# Patient Record
Sex: FEMALE | Race: WHITE
[De-identification: names, ages, dates, MRNs, and addresses within clinical notes are randomized per-mention and may not be internally consistent; named-entity substitution may affect disease eponyms.]

---

## 2020-03-12 ENCOUNTER — HOSPITAL ENCOUNTER (OUTPATIENT)
Dept: HOSPITAL 8 - CFH | Age: 75
Discharge: HOME | End: 2020-03-12
Attending: INTERNAL MEDICINE
Payer: MEDICARE

## 2020-03-12 DIAGNOSIS — I34.0: Primary | ICD-10-CM

## 2020-03-12 DIAGNOSIS — I11.9: ICD-10-CM

## 2020-03-12 DIAGNOSIS — E78.5: ICD-10-CM

## 2020-03-12 PROCEDURE — 93306 TTE W/DOPPLER COMPLETE: CPT

## 2020-03-13 ENCOUNTER — HOSPITAL ENCOUNTER (OUTPATIENT)
Dept: HOSPITAL 8 - RAD | Age: 75
Discharge: HOME | End: 2020-03-13
Attending: INTERNAL MEDICINE
Payer: MEDICARE

## 2020-03-13 DIAGNOSIS — I10: Primary | ICD-10-CM

## 2020-03-13 DIAGNOSIS — R07.89: ICD-10-CM

## 2020-03-13 PROCEDURE — 78452 HT MUSCLE IMAGE SPECT MULT: CPT

## 2020-03-13 PROCEDURE — A9502 TC99M TETROFOSMIN: HCPCS

## 2020-03-13 PROCEDURE — 93017 CV STRESS TEST TRACING ONLY: CPT

## 2021-01-15 DIAGNOSIS — Z23 NEED FOR VACCINATION: ICD-10-CM

## 2021-02-23 ENCOUNTER — CLINICAL SUPPORT (OUTPATIENT)
Dept: AUDIOLOGY | Facility: CLINIC | Age: 76
End: 2021-02-23

## 2021-02-23 ENCOUNTER — OFFICE VISIT (OUTPATIENT)
Dept: OTOLARYNGOLOGY | Facility: CLINIC | Age: 76
End: 2021-02-23

## 2021-02-23 VITALS
DIASTOLIC BLOOD PRESSURE: 85 MMHG | OXYGEN SATURATION: 99 % | BODY MASS INDEX: 31.54 KG/M2 | SYSTOLIC BLOOD PRESSURE: 130 MMHG | HEIGHT: 62 IN | TEMPERATURE: 98.1 F | WEIGHT: 171.4 LBS

## 2021-02-23 DIAGNOSIS — H93.11 TINNITUS OF RIGHT EAR: ICD-10-CM

## 2021-02-23 DIAGNOSIS — H91.21 SUDDEN IDIOPATHIC HEARING LOSS OF RIGHT EAR WITH RESTRICTED HEARING OF LEFT EAR: Primary | ICD-10-CM

## 2021-02-23 DIAGNOSIS — H90.A21 SENSORINEURAL HEARING LOSS (SNHL) OF RIGHT EAR WITH RESTRICTED HEARING OF LEFT EAR: Primary | ICD-10-CM

## 2021-02-23 PROCEDURE — 92557 COMPREHENSIVE HEARING TEST: CPT | Performed by: AUDIOLOGIST

## 2021-02-23 PROCEDURE — 99204 OFFICE O/P NEW MOD 45 MIN: CPT | Performed by: OTOLARYNGOLOGY

## 2021-02-23 PROCEDURE — 92567 TYMPANOMETRY: CPT | Performed by: AUDIOLOGIST

## 2021-02-23 RX ORDER — PREDNISONE 20 MG/1
TABLET ORAL
Qty: 24 TABLET | Refills: 0 | Status: SHIPPED | OUTPATIENT
Start: 2021-02-23 | End: 2021-03-12 | Stop reason: HOSPADM

## 2021-02-23 RX ORDER — CLONIDINE HYDROCHLORIDE 0.1 MG/1
0.1 TABLET ORAL 2 TIMES DAILY PRN
COMMUNITY
Start: 2020-12-22 | End: 2021-03-12 | Stop reason: HOSPADM

## 2021-02-23 NOTE — PROGRESS NOTES
STANDARD AUDIOMETRIC EVALUATION      Name:  Josi Roe  :  1945  Age:  75 y.o.  Date of Evaluation:  2021      HISTORY    Reason for visit:  Josi Roe is seen today for a hearing test at the request of Dr. Emile Oliver.  Patient reports can't hear in her right ear for the past 1 1/2 months.  She states sometimes she hears chirping sounds in her right ear.       EVALUATION    See Audiogram    RESULTS        Otoscopy and Tympanometry 226 Hz :  Right Ear:  Otoscopy:  Testing completed after ears were examined by the ENT physician          Tympanometry:  Negative middle ear pressure    Left Ear:   Otoscopy:  Testing completed after ears were examined by the ENT physician        Tympanometry:  Negative middle ear pressure    Test technique:  Standard Audiometry     Pure Tone Audiometry:   Patient responded to pure tones at 105-115 dB for 500-2000 Hz with no response to tones at other frequencies in right ear, and at 25-75 dB for 250-8000 Hz in left ear.       Speech Audiometry:        Right Ear:  Speech Awareness Threshold (SAT) was observed at 85 dBHL, masked                 Speech Discrimination scores were 0% in masking noise when words were presented at  100 dBHL       Left Ear:  Speech Reception Threshold (SRT) was obtained at 20 dBHL                 Speech Discrimination scores were 100% in quiet when words were presented at 60 dBHL    Reliability:   good    IMPRESSIONS:  1.  Tympanometry results are consistent with Negative middle ear pressure in both ears.  2.  Pure tone results are consistent with profound flat sensorineural hearing loss  for right ear, and mild to moderately severe sloping sensorineural hearing loss  in left ear.       RECOMMENDATIONS:  Patient is seeing the Ear Nose and Throat physician immediately following this examination.  It was a pleasure seeing Josi Roe in Audiology today.  We would be happy to do further testing or discuss these test as  necessary.          This document has been electronically signed by Becky Blackmon MS CCC-A on February 23, 2021 10:26 CST       Becky Blackmon MS CCC-A  Licensed Audiologist

## 2021-02-23 NOTE — PROGRESS NOTES
"Subjective   Josi Roe is a 75 y.o. female.       History of Present Illness   Patient reports that about 3 months ago she had wax in the right ear.  She went to urgent care and got that irrigated and was improved although a little dizzy.  Then approximately a month and a half ago she noticed decreased hearing in her right ear and put a Q-tip in her ear and felt some blood.  Was treated with oral antibiotics.  Had significant vertigo at that point.  Went back to see her family doctor who said her ear was clear.  Her vertigo has improved although not completely resolved.  Hearing is still \"gone\" from the right ear.  No significant occupational noise exposure.  No family history of hearing loss.  No previous otologic surgery.      The following portions of the patient's history were reviewed and updated as appropriate: allergies, current medications, past family history, past medical history, past social history, past surgical history and problem list.     reports that she has never smoked. She has never used smokeless tobacco. Alcohol use questions deferred to the physician. Drug use questions deferred to the physician.   Patient is not a tobacco user and has not been counseled for use of tobacco products      Review of Systems        Objective   Physical Exam  Ears: External ears no deformity, canals no discharge, tympanic membranes intact clear and mobile bilaterally.  Nose: No discharge or purulence  Oral cavity: No masses or lesions  Pharynx: No erythema or exudate  Neck: No lymphadenopathy.  No thyromegaly.  Trachea and larynx midline.  No masses in the parotid or submandibular glands.    Audiogram is obtained and reviewed.  She has profound sensorineural hearing loss on the right in all frequencies with 0% discrimination.  Mild sloping to moderate sensorineural loss  Is present on the left.  Tympanograms are mild negative pressure bilaterally.    Assessment/Plan   Diagnoses and all orders for this " visit:    1. Sensorineural hearing loss (SNHL) of right ear with restricted hearing of left ear (Primary)  -     MRI Internal Auditory Canal With Wo; Future    Other orders  -     predniSONE (DELTASONE) 20 MG tablet; 1 by mouth 3 times a day for 4 days one by mouth twice a day for 4 days, then 1 by mouth daily for 4 days.  Dispense: 24 tablet; Refill: 0        Plan: We will treat as if this is sudden sensorineural hearing loss.  Prednisone taper 60 mg down to nothing over 12 days.  Obtain MRI scan of the temporal bones with contrast to evaluate for retrocochlear tumor.  Return in 2 weeks with a hearing test and call sooner for problems.

## 2021-03-02 ENCOUNTER — TRANSCRIBE ORDERS (OUTPATIENT)
Dept: MRI IMAGING | Facility: HOSPITAL | Age: 76
End: 2021-03-02

## 2021-03-02 DIAGNOSIS — H90.A21 SENSORINEURAL HEARING LOSS (SNHL) OF RIGHT EAR WITH RESTRICTED HEARING OF LEFT EAR: Primary | ICD-10-CM

## 2021-03-03 ENCOUNTER — LAB (OUTPATIENT)
Dept: LAB | Facility: HOSPITAL | Age: 76
End: 2021-03-03

## 2021-03-03 ENCOUNTER — HOSPITAL ENCOUNTER (OUTPATIENT)
Dept: MRI IMAGING | Facility: HOSPITAL | Age: 76
Discharge: HOME | End: 2021-03-03

## 2021-03-03 DIAGNOSIS — H90.A21 SENSORINEURAL HEARING LOSS (SNHL) OF RIGHT EAR WITH RESTRICTED HEARING OF LEFT EAR: ICD-10-CM

## 2021-03-03 LAB
BUN SERPL-MCNC: 36 MG/DL (ref 8–23)
CREAT SERPL-MCNC: 0.99 MG/DL (ref 0.57–1)
GFR SERPL CREATININE-BSD FRML MDRD: 55 ML/MIN/1.73

## 2021-03-03 PROCEDURE — 82565 ASSAY OF CREATININE: CPT

## 2021-03-03 PROCEDURE — 25010000002 GADOTERIDOL PER 1 ML: Performed by: OTOLARYNGOLOGY

## 2021-03-03 PROCEDURE — 36415 COLL VENOUS BLD VENIPUNCTURE: CPT

## 2021-03-03 PROCEDURE — A9579 GAD-BASE MR CONTRAST NOS,1ML: HCPCS | Performed by: OTOLARYNGOLOGY

## 2021-03-03 PROCEDURE — 70553 MRI BRAIN STEM W/O & W/DYE: CPT

## 2021-03-03 PROCEDURE — 84520 ASSAY OF UREA NITROGEN: CPT

## 2021-03-03 RX ADMIN — GADOTERIDOL 17 ML: 279.3 INJECTION, SOLUTION INTRAVENOUS at 13:02

## 2021-03-07 ENCOUNTER — APPOINTMENT (OUTPATIENT)
Dept: GENERAL RADIOLOGY | Facility: HOSPITAL | Age: 76
End: 2021-03-07

## 2021-03-07 ENCOUNTER — HOSPITAL ENCOUNTER (INPATIENT)
Facility: HOSPITAL | Age: 76
LOS: 5 days | Discharge: NURSING, REHAB OR LONG-TERM CARE | End: 2021-03-12
Attending: FAMILY MEDICINE | Admitting: INTERNAL MEDICINE

## 2021-03-07 ENCOUNTER — APPOINTMENT (OUTPATIENT)
Dept: CT IMAGING | Facility: HOSPITAL | Age: 76
End: 2021-03-07

## 2021-03-07 ENCOUNTER — APPOINTMENT (OUTPATIENT)
Dept: MRI IMAGING | Facility: HOSPITAL | Age: 76
End: 2021-03-07

## 2021-03-07 DIAGNOSIS — I63.30 CEREBROVASCULAR ACCIDENT (CVA) DUE TO THROMBOSIS OF CEREBRAL ARTERY (HCC): Primary | ICD-10-CM

## 2021-03-07 DIAGNOSIS — Z78.9 IMPAIRED MOBILITY AND ADLS: ICD-10-CM

## 2021-03-07 DIAGNOSIS — I16.1 HYPERTENSIVE EMERGENCY: ICD-10-CM

## 2021-03-07 DIAGNOSIS — Z74.09 IMPAIRED FUNCTIONAL MOBILITY, BALANCE, GAIT, AND ENDURANCE: ICD-10-CM

## 2021-03-07 DIAGNOSIS — Z74.09 IMPAIRED MOBILITY AND ADLS: ICD-10-CM

## 2021-03-07 DIAGNOSIS — I67.858 OTHER HEREDITARY CEREBROVASCULAR DISEASE: ICD-10-CM

## 2021-03-07 DIAGNOSIS — I63.9 CEREBROVASCULAR ACCIDENT (CVA), UNSPECIFIED MECHANISM (HCC): ICD-10-CM

## 2021-03-07 LAB
ALBUMIN SERPL-MCNC: 3.9 G/DL (ref 3.5–5.2)
ALBUMIN/GLOB SERPL: 1.3 G/DL
ALP SERPL-CCNC: 58 U/L (ref 39–117)
ALT SERPL W P-5'-P-CCNC: 20 U/L (ref 1–33)
ANION GAP SERPL CALCULATED.3IONS-SCNC: 7 MMOL/L (ref 5–15)
AST SERPL-CCNC: 14 U/L (ref 1–32)
BASOPHILS # BLD AUTO: 0.02 10*3/MM3 (ref 0–0.2)
BASOPHILS NFR BLD AUTO: 0.2 % (ref 0–1.5)
BILIRUB SERPL-MCNC: 0.5 MG/DL (ref 0–1.2)
BUN SERPL-MCNC: 30 MG/DL (ref 8–23)
BUN/CREAT SERPL: 35.7 (ref 7–25)
CALCIUM SPEC-SCNC: 11.9 MG/DL (ref 8.6–10.5)
CHLORIDE SERPL-SCNC: 106 MMOL/L (ref 98–107)
CO2 SERPL-SCNC: 27 MMOL/L (ref 22–29)
CREAT SERPL-MCNC: 0.84 MG/DL (ref 0.57–1)
DEPRECATED RDW RBC AUTO: 42.1 FL (ref 37–54)
EOSINOPHIL # BLD AUTO: 0.08 10*3/MM3 (ref 0–0.4)
EOSINOPHIL NFR BLD AUTO: 0.7 % (ref 0.3–6.2)
ERYTHROCYTE [DISTWIDTH] IN BLOOD BY AUTOMATED COUNT: 13.5 % (ref 12.3–15.4)
FLUAV RNA RESP QL NAA+PROBE: NOT DETECTED
FLUBV RNA RESP QL NAA+PROBE: NOT DETECTED
GFR SERPL CREATININE-BSD FRML MDRD: 66 ML/MIN/1.73
GLOBULIN UR ELPH-MCNC: 3 GM/DL
GLUCOSE SERPL-MCNC: 75 MG/DL (ref 65–99)
HCT VFR BLD AUTO: 41.4 % (ref 34–46.6)
HGB BLD-MCNC: 14.1 G/DL (ref 12–15.9)
HOLD SPECIMEN: NORMAL
HOLD SPECIMEN: NORMAL
IMM GRANULOCYTES # BLD AUTO: 0.08 10*3/MM3 (ref 0–0.05)
IMM GRANULOCYTES NFR BLD AUTO: 0.7 % (ref 0–0.5)
LYMPHOCYTES # BLD AUTO: 2.31 10*3/MM3 (ref 0.7–3.1)
LYMPHOCYTES NFR BLD AUTO: 20.7 % (ref 19.6–45.3)
MCH RBC QN AUTO: 29.5 PG (ref 26.6–33)
MCHC RBC AUTO-ENTMCNC: 34.1 G/DL (ref 31.5–35.7)
MCV RBC AUTO: 86.6 FL (ref 79–97)
MONOCYTES # BLD AUTO: 1.11 10*3/MM3 (ref 0.1–0.9)
MONOCYTES NFR BLD AUTO: 9.9 % (ref 5–12)
NEUTROPHILS NFR BLD AUTO: 67.8 % (ref 42.7–76)
NEUTROPHILS NFR BLD AUTO: 7.58 10*3/MM3 (ref 1.7–7)
NRBC BLD AUTO-RTO: 0 /100 WBC (ref 0–0.2)
NT-PROBNP SERPL-MCNC: 623.1 PG/ML (ref 0–900)
PLATELET # BLD AUTO: 278 10*3/MM3 (ref 140–450)
PMV BLD AUTO: 11 FL (ref 6–12)
POTASSIUM SERPL-SCNC: 4.2 MMOL/L (ref 3.5–5.2)
PROT SERPL-MCNC: 6.9 G/DL (ref 6–8.5)
RBC # BLD AUTO: 4.78 10*6/MM3 (ref 3.77–5.28)
SARS-COV-2 RNA RESP QL NAA+PROBE: NOT DETECTED
SODIUM SERPL-SCNC: 140 MMOL/L (ref 136–145)
TROPONIN T SERPL-MCNC: <0.01 NG/ML (ref 0–0.03)
TROPONIN T SERPL-MCNC: <0.01 NG/ML (ref 0–0.03)
WBC # BLD AUTO: 11.18 10*3/MM3 (ref 3.4–10.8)
WHOLE BLOOD HOLD SPECIMEN: NORMAL

## 2021-03-07 PROCEDURE — 93005 ELECTROCARDIOGRAM TRACING: CPT | Performed by: FAMILY MEDICINE

## 2021-03-07 PROCEDURE — 70450 CT HEAD/BRAIN W/O DYE: CPT

## 2021-03-07 PROCEDURE — 70496 CT ANGIOGRAPHY HEAD: CPT

## 2021-03-07 PROCEDURE — 87636 SARSCOV2 & INF A&B AMP PRB: CPT | Performed by: FAMILY MEDICINE

## 2021-03-07 PROCEDURE — 99285 EMERGENCY DEPT VISIT HI MDM: CPT

## 2021-03-07 PROCEDURE — 83880 ASSAY OF NATRIURETIC PEPTIDE: CPT | Performed by: FAMILY MEDICINE

## 2021-03-07 PROCEDURE — 80053 COMPREHEN METABOLIC PANEL: CPT | Performed by: FAMILY MEDICINE

## 2021-03-07 PROCEDURE — 25010000002 HYDRALAZINE PER 20 MG: Performed by: FAMILY MEDICINE

## 2021-03-07 PROCEDURE — 25010000003 ATROPINE SULFATE

## 2021-03-07 PROCEDURE — 93010 ELECTROCARDIOGRAM REPORT: CPT | Performed by: INTERNAL MEDICINE

## 2021-03-07 PROCEDURE — 70551 MRI BRAIN STEM W/O DYE: CPT

## 2021-03-07 PROCEDURE — 70498 CT ANGIOGRAPHY NECK: CPT

## 2021-03-07 PROCEDURE — 71045 X-RAY EXAM CHEST 1 VIEW: CPT

## 2021-03-07 PROCEDURE — 25010000002 ENOXAPARIN PER 10 MG: Performed by: INTERNAL MEDICINE

## 2021-03-07 PROCEDURE — 99223 1ST HOSP IP/OBS HIGH 75: CPT | Performed by: PSYCHIATRY & NEUROLOGY

## 2021-03-07 PROCEDURE — 0 IOPAMIDOL PER 1 ML: Performed by: FAMILY MEDICINE

## 2021-03-07 PROCEDURE — 84484 ASSAY OF TROPONIN QUANT: CPT | Performed by: FAMILY MEDICINE

## 2021-03-07 PROCEDURE — 85025 COMPLETE CBC W/AUTO DIFF WBC: CPT | Performed by: FAMILY MEDICINE

## 2021-03-07 RX ORDER — ACETAMINOPHEN 325 MG/1
650 TABLET ORAL EVERY 4 HOURS PRN
Status: DISCONTINUED | OUTPATIENT
Start: 2021-03-07 | End: 2021-03-12 | Stop reason: HOSPADM

## 2021-03-07 RX ORDER — ASPIRIN 325 MG
325 TABLET ORAL ONCE
Status: COMPLETED | OUTPATIENT
Start: 2021-03-07 | End: 2021-03-07

## 2021-03-07 RX ORDER — DEXTROSE AND SODIUM CHLORIDE 5; .45 G/100ML; G/100ML
75 INJECTION, SOLUTION INTRAVENOUS CONTINUOUS
Status: DISCONTINUED | OUTPATIENT
Start: 2021-03-07 | End: 2021-03-07

## 2021-03-07 RX ORDER — ACETAMINOPHEN 160 MG/5ML
650 SOLUTION ORAL EVERY 4 HOURS PRN
Status: DISCONTINUED | OUTPATIENT
Start: 2021-03-07 | End: 2021-03-12 | Stop reason: HOSPADM

## 2021-03-07 RX ORDER — SODIUM CHLORIDE 9 MG/ML
INJECTION, SOLUTION INTRAVENOUS
Status: COMPLETED
Start: 2021-03-07 | End: 2021-03-07

## 2021-03-07 RX ORDER — ONDANSETRON 2 MG/ML
4 INJECTION INTRAMUSCULAR; INTRAVENOUS EVERY 6 HOURS PRN
Status: DISCONTINUED | OUTPATIENT
Start: 2021-03-07 | End: 2021-03-12 | Stop reason: HOSPADM

## 2021-03-07 RX ORDER — SODIUM CHLORIDE 0.9 % (FLUSH) 0.9 %
10 SYRINGE (ML) INJECTION AS NEEDED
Status: DISCONTINUED | OUTPATIENT
Start: 2021-03-07 | End: 2021-03-12 | Stop reason: HOSPADM

## 2021-03-07 RX ORDER — SODIUM CHLORIDE 9 MG/ML
75 INJECTION, SOLUTION INTRAVENOUS CONTINUOUS
Status: DISCONTINUED | OUTPATIENT
Start: 2021-03-07 | End: 2021-03-10

## 2021-03-07 RX ORDER — PANTOPRAZOLE SODIUM 40 MG/10ML
40 INJECTION, POWDER, LYOPHILIZED, FOR SOLUTION INTRAVENOUS
Status: DISCONTINUED | OUTPATIENT
Start: 2021-03-08 | End: 2021-03-09

## 2021-03-07 RX ORDER — HYDRALAZINE HYDROCHLORIDE 20 MG/ML
10 INJECTION INTRAMUSCULAR; INTRAVENOUS ONCE
Status: COMPLETED | OUTPATIENT
Start: 2021-03-07 | End: 2021-03-07

## 2021-03-07 RX ORDER — ACETAMINOPHEN 650 MG/1
650 SUPPOSITORY RECTAL EVERY 4 HOURS PRN
Status: DISCONTINUED | OUTPATIENT
Start: 2021-03-07 | End: 2021-03-12 | Stop reason: HOSPADM

## 2021-03-07 RX ORDER — SODIUM CHLORIDE 0.9 % (FLUSH) 0.9 %
10 SYRINGE (ML) INJECTION EVERY 12 HOURS SCHEDULED
Status: DISCONTINUED | OUTPATIENT
Start: 2021-03-07 | End: 2021-03-12 | Stop reason: HOSPADM

## 2021-03-07 RX ORDER — CLOPIDOGREL BISULFATE 75 MG/1
300 TABLET ORAL ONCE
Status: COMPLETED | OUTPATIENT
Start: 2021-03-07 | End: 2021-03-07

## 2021-03-07 RX ORDER — ATORVASTATIN CALCIUM 40 MG/1
80 TABLET, FILM COATED ORAL DAILY
Status: DISCONTINUED | OUTPATIENT
Start: 2021-03-07 | End: 2021-03-12 | Stop reason: HOSPADM

## 2021-03-07 RX ORDER — ONDANSETRON 4 MG/1
4 TABLET, FILM COATED ORAL EVERY 6 HOURS PRN
Status: DISCONTINUED | OUTPATIENT
Start: 2021-03-07 | End: 2021-03-12 | Stop reason: HOSPADM

## 2021-03-07 RX ADMIN — ENOXAPARIN SODIUM 40 MG: 40 INJECTION SUBCUTANEOUS at 16:11

## 2021-03-07 RX ADMIN — SODIUM CHLORIDE 1000 ML: 900 INJECTION, SOLUTION INTRAVENOUS at 14:41

## 2021-03-07 RX ADMIN — ATROPINE SULFATE 1 MG: 0.1 INJECTION PARENTERAL at 14:42

## 2021-03-07 RX ADMIN — HYDRALAZINE HYDROCHLORIDE 10 MG: 20 INJECTION INTRAMUSCULAR; INTRAVENOUS at 09:43

## 2021-03-07 RX ADMIN — SODIUM CHLORIDE 1 MG/HR: 9 INJECTION, SOLUTION INTRAVENOUS at 19:49

## 2021-03-07 RX ADMIN — ASPIRIN 325 MG: 325 TABLET, COATED ORAL at 14:48

## 2021-03-07 RX ADMIN — DEXTROSE AND SODIUM CHLORIDE 75 ML/HR: 5; 450 INJECTION, SOLUTION INTRAVENOUS at 15:57

## 2021-03-07 RX ADMIN — IOPAMIDOL 90 ML: 755 INJECTION, SOLUTION INTRAVENOUS at 15:27

## 2021-03-07 RX ADMIN — ATORVASTATIN CALCIUM 80 MG: 40 TABLET, FILM COATED ORAL at 16:01

## 2021-03-07 RX ADMIN — SODIUM CHLORIDE 75 ML/HR: 900 INJECTION, SOLUTION INTRAVENOUS at 19:57

## 2021-03-07 RX ADMIN — CLOPIDOGREL BISULFATE 300 MG: 75 TABLET ORAL at 14:47

## 2021-03-07 RX ADMIN — SODIUM CHLORIDE 5 MG/HR: 9 INJECTION, SOLUTION INTRAVENOUS at 11:38

## 2021-03-07 NOTE — ED NOTES
Dr newsome called to bedside for adverse change in vital signs, cardene drip stopped, pt placed in trendelenberg position, iv fluids being obtained     Marcia Yarbrough, RN  03/07/21 0585

## 2021-03-07 NOTE — SIGNIFICANT NOTE
03/07/21 1530   OTHER   Discipline occupational therapist;physical therapist   Rehab Time/Intention   Session Not Performed unable to evaluate, medical status change  (Therapy to check w/ neuro 3.8.21 before attempting eval)   Recommendation   PT - Next Appointment 03/08/21   Recommendation   OT - Next Appointment 03/08/21

## 2021-03-07 NOTE — H&P
[facility] Medicine Services  INPATIENT HISTORY AND PHYSICAL       Patient Care Team:  Debra Wilkes MD as PCP - General (Family Medicine)    Chief complaint   Chief Complaint   Patient presents with   • Balance Issues       Subjective     Patient is a 75 y.o. female with history of hypertension presents to the ER with onset of slurred speech, right-sided weakness and dizziness.  No reported history of fever, chills, nausea, vomiting, chest pain, shortness of air, headaches or blurry vision.  Patient reportedly saw ENT last week for some hearing loss and had an MRI of the brain which was unremarkable.    On triage, her peak blood pressure was 246/113 which did improve to 165/77.  Noncontrast CT scan of the head showed old left basal ganglia and caudate nucleus infarcts and old lacunar infarct in the right thalamus.  MRI of the brain showed showed an acute to 1.5 cm infarct on the left side of the renny.   Patient was seen by teleneurologist and started on dual antiplatelet therapy and high-dose statin.    Review of Systems   Constitutional: Positive for activity change, appetite change and fatigue. Negative for chills, diaphoresis and fever.   HENT: Negative for trouble swallowing and voice change.    Eyes: Negative for photophobia and visual disturbance.   Respiratory: Negative for cough, choking, chest tightness, shortness of breath, wheezing and stridor.    Cardiovascular: Negative for chest pain, palpitations and leg swelling.   Gastrointestinal: Negative for abdominal distention, abdominal pain, blood in stool, constipation, diarrhea, nausea and vomiting.   Endocrine: Negative for cold intolerance, heat intolerance, polydipsia, polyphagia and polyuria.   Genitourinary: Negative for decreased urine volume, difficulty urinating, dysuria, enuresis, flank pain, frequency, hematuria and urgency.   Musculoskeletal: Negative for arthralgias, gait problem, myalgias,  neck pain and neck stiffness.   Skin: Negative for pallor, rash and wound.   Neurological: Positive for dizziness, speech difficulty and weakness. Negative for tremors, seizures, syncope, facial asymmetry, light-headedness, numbness and headaches.   Hematological: Does not bruise/bleed easily.   Psychiatric/Behavioral: Negative for agitation, behavioral problems and confusion.         History  Past Medical History:   Diagnosis Date   • Heart murmur    • Hypertension      Past Surgical History:   Procedure Laterality Date   • CHOLECYSTECTOMY     • CLUB FOOT RELEASE     • HYSTERECTOMY       Family History   Problem Relation Age of Onset   • Thyroid disease Mother    • Cancer Father    • Thyroid disease Sister    • Cancer Paternal Aunt    • Cancer Paternal Uncle    • Heart disease Paternal Uncle    • Diabetes Maternal Grandmother    • Diabetes Maternal Grandfather      Social History     Tobacco Use   • Smoking status: Never Smoker   • Smokeless tobacco: Never Used   Substance Use Topics   • Alcohol use: Never   • Drug use: Never     (Not in a hospital admission)    Allergies:  Novocain [procaine]  Prior to Admission medications    Medication Sig Start Date End Date Taking? Authorizing Provider   cloNIDine (CATAPRES) 0.1 MG tablet Take 0.1 mg by mouth 2 (Two) Times a Day As Needed. Uses prn 12/22/20   Peace Connro MD   predniSONE (DELTASONE) 20 MG tablet 1 by mouth 3 times a day for 4 days one by mouth twice a day for 4 days, then 1 by mouth daily for 4 days. 2/23/21   Emile Oliver MD   VITAMIN D, CHOLECALCIFEROL, PO Take 5,000 Units by mouth Daily.    Peace Connor MD       Objective        Vital Signs  Temp:  [98.4 °F (36.9 °C)] 98.4 °F (36.9 °C)  Heart Rate:  [36-79] 78  Resp:  [16-18] 18  BP: ()/() 165/77      Physical Exam  Vitals and nursing note reviewed.   Constitutional:       General: She is not in acute distress.     Appearance: She is well-developed. She is obese.  She is not diaphoretic.   HENT:      Head: Normocephalic and atraumatic.      Right Ear: External ear normal.      Left Ear: External ear normal.      Nose: Nose normal.   Eyes:      Conjunctiva/sclera: Conjunctivae normal.      Pupils: Pupils are equal, round, and reactive to light.   Neck:      Thyroid: No thyromegaly.      Vascular: No JVD.   Cardiovascular:      Rate and Rhythm: Normal rate and regular rhythm.      Heart sounds: Normal heart sounds. No murmur. No friction rub. No gallop.    Pulmonary:      Effort: Pulmonary effort is normal. No respiratory distress.      Breath sounds: Normal breath sounds. No stridor. No wheezing or rales.   Chest:      Chest wall: No tenderness.   Abdominal:      General: Bowel sounds are normal. There is no distension.      Palpations: Abdomen is soft. There is no mass.      Tenderness: There is no abdominal tenderness. There is no guarding or rebound.      Hernia: No hernia is present.   Musculoskeletal:         General: No swelling, tenderness or deformity. Normal range of motion.      Cervical back: Normal range of motion and neck supple.      Right lower leg: No edema.      Left lower leg: No edema.   Skin:     General: Skin is warm and dry.      Coloration: Skin is not jaundiced or pale.      Findings: No erythema or rash.   Neurological:      Mental Status: She is alert and oriented to person, place, and time.      Cranial Nerves: Facial asymmetry present.      Sensory: No sensory deficit.      Motor: Weakness present.      Deep Tendon Reflexes: Reflexes are normal and symmetric.      Comments: She has right facial droop and right-sided hemiparesis.   Psychiatric:         Mood and Affect: Mood normal.         Behavior: Behavior normal. Behavior is cooperative.         Thought Content: Thought content normal.         Judgment: Judgment normal.           Results Review:     Results from last 7 days   Lab Units 03/07/21  0932 03/03/21  1215   SODIUM mmol/L 140  --       POTASSIUM mmol/L 4.2  --    CHLORIDE mmol/L 106  --    CO2 mmol/L 27.0  --    BUN mg/dL 30* 36*   CREATININE mg/dL 0.84 0.99   GLUCOSE mg/dL 75  --    CALCIUM mg/dL 11.9*  --    BILIRUBIN mg/dL 0.5  --    ALK PHOS U/L 58  --    ALT (SGPT) U/L 20  --    AST (SGOT) U/L 14  --              Results from last 7 days   Lab Units 03/07/21  0932   WBC 10*3/mm3 11.18*   HEMOGLOBIN g/dL 14.1   HEMATOCRIT % 41.4   PLATELETS 10*3/mm3 278           Imaging Results (Last 7 Days)     Procedure Component Value Units Date/Time    MRI Brain Without Contrast [[id number]] Collected: 03/07/21 1214     Updated: 03/07/21 1339    Narrative:        MRI of the brain without contrast    HISTORY: Acute neurologic deficit.    Multisequence multiplanar images of the brain were obtained  without and with contrast.    COMPARISON: March 3, 2021. Correlation CT March 7, 2021..     FINDINGS:   Minimal mucosal thickening ethmoid sinuses.    Acute 1.5 cm infarct left side of the renny.  Old 1.9 cm infarct anterior aspect of the left basal ganglia.  Old thalamic and basal ganglia lacunar infarcts.  Minimal small vessel disease.  Cerebral atrophy.  No hemorrhage.  No mass.  No abnormal enhancement.  No midline shift and no abnormal extra-axial fluid collections.  Normal signal flow voids are present in the major vessels and  venous sinuses.      Impression:      CONCLUSION:  Acute 1.5 cm infarct left side of the renny.  Old 1.9 cm infarct anterior aspect of the left basal ganglia.  Old thalamic and basal ganglia lacunar infarcts.  Minimal small vessel disease.  Cerebral atrophy.    Report called at approximately 1:50 PM CST.    [id number]    Electronically signed by:  Carlos Keith MD  3/7/2021 1:38 PM [facility]  Workstation: [id number]    CT Head Without Contrast [[id number]] Collected: 03/07/21 1049     Updated: 03/07/21 1126    Narrative:      Noncontrast CT examination of the brain.    INDICATION: Acute neurological deficit. Stroke protocol.       Technique:  Axial 5 mm contiguous images with brain parenchymal  and bone windows    This exam was performed according to our departmental  dose-optimization program, which includes automated exposure  control, adjustment of the mA and/or kV according to patient size  and/or use of iterative reconstruction technique.    Prior relevant examination: MRI brain, posterior fossa March 3,  2021. If old infarct left basal ganglia. Old lacunar infarct  right thalamus.    Brain parenchyma appears otherwise within normal limits.  Ventricles are within normal limits in size. No evidence of  abnormal mass or calcification is seen. No evidence of acute  hemorrhage is noted. Bony structures appear within normal limits  and the mastoid air cells and visualized paranasal sinuses are  normally aerated.        Impression:      Old infarct left basal ganglia, caudate nucleus. Old lacunar  infarct right thalamus. CT brain without contrast is otherwise  unremarkable. There are no acute changes.    Electronically signed by:  Favian Pittman MD  3/7/2021 11:25 AM CST  Workstation: [id number]    XR Chest 1 View [[id number]] Collected: 03/07/21 0946     Updated: 03/07/21 1007    Narrative:      Chest single view.       CLINICAL INDICATION: Chest pain protocol.    COMPARISON: None    FINDINGS: Cardiac silhouette is normal in size. Pulmonary  vascularity is unremarkable.     No focal infiltrate or consolidation.  No pleural effusion.  No  pneumothorax.      Impression:      No evidence of active disease.    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  CT angiogram of head and neck/transthoracic echocardiogram are pending.  COVID-19 PCR is  negative.  Input by the neurologist is appreciated.    Accelerated hypertension: Blood pressure is currently permissive.  We will continue to monitor and resume antihypertensives in a.m.    Transient bradycardia: Heart rate has improved.  Continue to monitor.  Consult cardiologist if the need arises.    Begin GI and DVT prophylaxis.    Additional orders and treatment plan as hospital course dictates.      I discussed the patient's findings and my recommendations with patient and her daughter.     Christopher Alvarez MD  03/07/21  14:59 CST      Dictated Utilizing Dragon Dictation

## 2021-03-07 NOTE — ED PROVIDER NOTES
"Subjective     History provided by:  Patient   used: No      Patient is a 75 years old female with past medical history of hypertension who presented here today because of unsteady balance.  She said that she has been having a problem with the right ear for 3 months time.  She has been seen Dr. Oliver who gave her some steroid to go home and told her to come back on Wednesday this week and is not helping.  She said the dizziness is worse when he stands or walking.  Also is worse on movement or changing position.  Resting helps the dizziness.  Denies any chest pain, shortness of breath, or vision changes.  Denies any fever chills or sweating.  Review of Systems   Neurological: Positive for light-headedness.       Past Medical History:   Diagnosis Date   • Heart murmur    • Hypertension        Allergies   Allergen Reactions   • Novocain [Procaine] Other (See Comments)     Passes Out       Past Surgical History:   Procedure Laterality Date   • CHOLECYSTECTOMY     • CLUB FOOT RELEASE     • HYSTERECTOMY         Family History   Problem Relation Age of Onset   • Thyroid disease Mother    • Cancer Father    • Thyroid disease Sister    • Cancer Paternal Aunt    • Cancer Paternal Uncle    • Heart disease Paternal Uncle    • Diabetes Maternal Grandmother    • Diabetes Maternal Grandfather        Social History     Socioeconomic History   • Marital status:      Spouse name: Not on file   • Number of children: Not on file   • Years of education: Not on file   • Highest education level: Not on file   Tobacco Use   • Smoking status: Never Smoker   • Smokeless tobacco: Never Used   Substance and Sexual Activity   • Alcohol use: Never   • Drug use: Never   • Sexual activity: Defer       /77   Pulse 78   Temp 98.4 °F (36.9 °C) (Oral)   Resp 18   Ht 154.9 cm (61\")   Wt 80 kg (176 lb 6.4 oz)   SpO2 100%   BMI 33.33 kg/m²     Objective   Physical Exam  Constitutional:       Appearance: Normal " appearance. She is obese.   HENT:      Head: Normocephalic and atraumatic.      Right Ear: Tympanic membrane, ear canal and external ear normal.      Left Ear: Tympanic membrane, ear canal and external ear normal.      Nose: Nose normal.   Eyes:      Extraocular Movements: Extraocular movements intact.      Conjunctiva/sclera: Conjunctivae normal.      Pupils: Pupils are equal, round, and reactive to light.   Cardiovascular:      Rate and Rhythm: Normal rate and regular rhythm.      Pulses: Normal pulses.      Heart sounds: Normal heart sounds.   Pulmonary:      Effort: Pulmonary effort is normal.      Breath sounds: Normal breath sounds.   Abdominal:      General: Abdomen is flat. Bowel sounds are normal.      Palpations: Abdomen is soft.   Musculoskeletal:         General: Normal range of motion.      Cervical back: Normal range of motion and neck supple.   Skin:     General: Skin is warm.      Capillary Refill: Capillary refill takes less than 2 seconds.   Neurological:      General: No focal deficit present.      Mental Status: She is alert and oriented to person, place, and time.   Psychiatric:         Mood and Affect: Mood normal.         Behavior: Behavior normal.         Thought Content: Thought content normal.         Judgment: Judgment normal.         Procedures           ED Course  ED Course as of Mar 07 1533   Sun Mar 07, 2021   1337 RDW-SD: 42.1 [MO]   1422 Spoke to Dr. Tierney who came and saw patient through telemedicine.  He suggested that patient should be started on aspirin 325 mg, Plavix 300 mg, Lipitor 80 mg once a day and ordered 2D echo.Results were discussed patient and family member.Dr. Alvarez was called who accepted patient.    [MO]      ED Course User Index  [MO] Quinton Olivier MD            Labs Reviewed   COMPREHENSIVE METABOLIC PANEL - Abnormal; Notable for the following components:       Result Value    BUN 30 (*)     Calcium 11.9 (*)     BUN/Creatinine Ratio 35.7 (*)     All  other components within normal limits    Narrative:     GFR Normal >60  Chronic Kidney Disease <60  Kidney Failure <15     CBC WITH AUTO DIFFERENTIAL - Abnormal; Notable for the following components:    WBC 11.18 (*)     Immature Grans % 0.7 (*)     Neutrophils, Absolute 7.58 (*)     Monocytes, Absolute 1.11 (*)     Immature Grans, Absolute 0.08 (*)     All other components within normal limits   COVID-19 AND FLU A/B, NP SWAB IN TRANSPORT MEDIA 8-12 HR TAT - Normal    Narrative:     Fact sheet for providers: https://www.fda.gov/media/822016/download    Fact sheet for patients: https://www.fda.gov/media/215377/download    Test performed by PCR.   TROPONIN (IN-HOUSE) - Normal    Narrative:     Troponin T Reference Range:  <= 0.03 ng/mL-   Negative for AMI  >0.03 ng/mL-     Abnormal for myocardial necrosis.  Clinicians would have to utilize clinical acumen, EKG, Troponin and serial changes to determine if it is an Acute Myocardial Infarction or myocardial injury due to an underlying chronic condition.       Results may be falsely decreased if patient taking Biotin.     TROPONIN (IN-HOUSE) - Normal    Narrative:     Troponin T Reference Range:  <= 0.03 ng/mL-   Negative for AMI  >0.03 ng/mL-     Abnormal for myocardial necrosis.  Clinicians would have to utilize clinical acumen, EKG, Troponin and serial changes to determine if it is an Acute Myocardial Infarction or myocardial injury due to an underlying chronic condition.       Results may be falsely decreased if patient taking Biotin.     BNP (IN-HOUSE) - Normal    Narrative:     Among patients with dyspnea, NT-proBNP is highly sensitive for the detection of acute congestive heart failure. In addition NT-proBNP of <300 pg/ml effectively rules out acute congestive heart failure with 99% negative predictive value.    Results may be falsely decreased if patient taking Biotin.     RAINBOW DRAW    Narrative:     The following orders were created for panel order [place]  Draw.  Procedure                               Abnormality         Status                     ---------                               -----------         ------                     Light Blue Top[[id number]]                                   Final result               Green Top (Gel)[[id number]]                                  Final result               Lavender Top[[id number]]                                     Final result               Gold Top - SST[[id number]]                                   Final result                 Please view results for these tests on the individual orders.   CBC AND DIFFERENTIAL    Narrative:     The following orders were created for panel order CBC & Differential.  Procedure                               Abnormality         Status                     ---------                               -----------         ------                     CBC Auto Differential[[id number]]        Abnormal            Final result                 Please view results for these tests on the individual orders.   LIGHT BLUE TOP   GREEN TOP   LAVENDER TOP   GOLD TOP - SST   EXTRA TUBES    Narrative:     The following orders were created for panel order Extra Tubes.  Procedure                               Abnormality         Status                     ---------                               -----------         ------                     Lavender Top[[id number]]                                     Final result                 Please view results for these tests on the individual orders.   LAVENDER TOP       MRI Brain Without Contrast   Final Result   CONCLUSION:   Acute 1.5 cm infarct left side of the renny.   Old 1.9 cm infarct anterior aspect of the left basal ganglia.   Old thalamic and basal ganglia lacunar infarcts.   Minimal small vessel disease.   Cerebral atrophy.      Report called at approximately 1:50 PM CST.      [id number]      Electronically signed by:  Carlos Keith MD  3/7/2021 1:38 PM CST   Workstation:  [id number]      CT Head Without Contrast   Final Result   Old infarct left basal ganglia, caudate nucleus. Old lacunar   infarct right thalamus. CT brain without contrast is otherwise   unremarkable. There are no acute changes.      Electronically signed by:  Favian Pittman MD  3/7/2021 11:25 AM CST   Workstation: [id number]      XR Chest 1 View   Final Result   No evidence of active disease.      Electronically signed by:  Favian Pittman MD  3/7/2021 10:06 AM CST   Workstation: [id number]      CT Angiogram Carotids    (Results Pending)   CT Angiogram Head    (Results Pending)                                   NIHSS (NIH Stroke Scale/Score) reviewed and/or performed as part of the patient evaluation and treatment planning process.  The result associated with this review/performance is: 1       MDM    Final diagnoses:   Cerebrovascular accident (CVA), unspecified mechanism (CMS/HCC)   Hypertensive emergency            Quinton Olivier MD  03/07/21 1428       Quinton Olivier MD  03/07/21 1429       Quinton Olivier MD  03/07/21 1533

## 2021-03-07 NOTE — CONSULTS
Stroke Consult Note    Patient Name: Josi Roe   MRN: [id number]  Age: 75 y.o.  Sex: female  : 1945    Primary Care Physician: Debra Wilkes MD  Referring Physician:  Quinton Olivier, *    TIME STROKE TEAM CALLED: 12:46 PM EST     TIME PATIENT SEEN: 2:45 PM EST (pt unable to be seen 2/2 technical issues and then pt gone for MRI)    Handedness: Right  Race: White    Chief Complaint/Reason for Consultation: Slurred speech    Subjective .  HPI: 75-year-old right-handed white female with known diagnosis of hypertension, on as needed medications, hyperlipidemia, who comes in after her daughter noted her to have slurred speech on waking up this morning.  Patient denies any focal weakness/numbness.  She did not realize having the slurred speech.  Patient denies having any dysphagia or dysarthria or double vision.  Patient recently had seen ENT for decreased hearing since last few months, for which she got an MRI brain/auditory canal last week, which was negative for any acute findings.    Last Known Normal Date/Time: Unknown (as patient did not realize having any symptoms, and daughter noted slurred speech this morning)     Review of Systems   Constitutional: Negative.    HENT: Positive for hearing loss.    Eyes: Negative.    Respiratory: Negative.    Cardiovascular: Negative.    Gastrointestinal: Negative.    Endocrine: Negative.    Genitourinary: Negative.    Musculoskeletal: Negative.    Skin: Negative.    Allergic/Immunologic: Negative.    Neurological: Positive for speech difficulty.   Hematological: Negative.    Psychiatric/Behavioral: Negative.       Past Medical History:   Diagnosis Date   • Heart murmur    • Hypertension      Past Surgical History:   Procedure Laterality Date   • CHOLECYSTECTOMY     • CLUB FOOT RELEASE     • HYSTERECTOMY       Family History   Problem Relation Age of Onset   • Thyroid disease Mother    • Cancer Father    • Thyroid disease Sister    • Cancer Paternal  Aunt    • Cancer Paternal Uncle    • Heart disease Paternal Uncle    • Diabetes Maternal Grandmother    • Diabetes Maternal Grandfather      Social History     Socioeconomic History   • Marital status:      Spouse name: Not on file   • Number of children: Not on file   • Years of education: Not on file   • Highest education level: Not on file   Tobacco Use   • Smoking status: Never Smoker   • Smokeless tobacco: Never Used   Substance and Sexual Activity   • Alcohol use: Never   • Drug use: Never   • Sexual activity: Defer     Allergies   Allergen Reactions   • Novocain [Procaine] Other (See Comments)     Passes Out     Prior to Admission medications    Medication Sig Start Date End Date Taking? Authorizing Provider   cloNIDine (CATAPRES) 0.1 MG tablet Uses prn 12/22/20   Peace Connor MD   predniSONE (DELTASONE) 20 MG tablet 1 by mouth 3 times a day for 4 days one by mouth twice a day for 4 days, then 1 by mouth daily for 4 days. 2/23/21   Emile Oliver MD   Vitamin D, Cholecalciferol, (CHOLECALCIFEROL) 10 MCG (400 UNIT) tablet Take 400 Units by mouth Daily.    ProviderPeace MD             Objective     Temp:  [98.4 °F (36.9 °C)] 98.4 °F (36.9 °C)  Heart Rate:  [42-68] 62  Resp:  [16-18] 18  BP: (164-246)/() 168/77  Neurological Exam  Mental Status  Awake, alert and oriented to person, place and time.Alert. Speech is normal. Speech: Did not appreciate any dysarthria on my exam. Language is fluent with no aphasia. Attention and concentration are normal. Fund of knowledge is appropriate for level of education.    Cranial Nerves  CN II: Visual fields full to confrontation.  CN III, IV, VI: Extraocular movements intact bilaterally. Pupils equal round and reactive to light bilaterally.  CN V: Facial sensation is normal.  CN VII:  Right: Subtle right facial asymmetry.  Left: There is no facial weakness.  CN VIII: Equal hearing bilaterally.  CN IX, X: Palate elevates  symmetrically  CN XI: Shoulder shrug strength is normal.  CN XII: Tongue midline without atrophy or fasciculations.    Motor  Normal muscle bulk throughout. No fasciculations present.  Mild drift in the right lower extremity, but no obvious focal weakness appreciated.    Sensory  Light touch is normal in upper and lower extremities.     Reflexes  Not assessed.    Coordination  No dysmetria.    Gait  Not assessed.      Physical Exam  Vitals and nursing note reviewed.   Constitutional:       Appearance: Normal appearance.   HENT:      Head: Normocephalic and atraumatic.      Mouth/Throat:      Mouth: Mucous membranes are moist.      Pharynx: Oropharynx is clear.   Eyes:      Extraocular Movements: Extraocular movements intact.      Conjunctiva/sclera: Conjunctivae normal.      Pupils: Pupils are equal, round, and reactive to light.   Cardiovascular:      Rate and Rhythm: Normal rate and regular rhythm.   Pulmonary:      Effort: Pulmonary effort is normal. No respiratory distress.   Neurological:      Mental Status: She is alert.   Psychiatric:         Mood and Affect: Mood normal.         Speech: Speech normal.         Behavior: Behavior normal.         Acute Stroke Data    Alteplase (tPA) Inclusion / Exclusion Criteria    Time: 14:22 CST  Person Administering Scale: Jean Tierney MD    Inclusion Criteria  [x]   18 years of age or greater   []   Onset of symptoms < 4.5 hours before beginning treatment (stroke onset = time patient was last seen well or without symptoms).   []   Diagnosis of acute ischemic stroke causing measurable disabling deficit (Complete Hemianopia, Any Aphasia, Visual or Sensory Extinction, Any weakness limiting sustained effort against gravity)   []   Any remaining deficit considered potentially disabling in view of patient and practitioner   Exclusion criteria (Do not proceed with Alteplase if any are checked under exclusion criteria)  []   Onset unknown or GREATER than 4.5 hours   []   ICH on  CT/MRI   []   CT demonstrates hypodensity representing acute or subacute infarct   []   Significant head trauma or prior stroke in the previous 3 months   []   Symptoms suggestive of subarachnoid hemorrhage   []   History of un-ruptured intracranial aneurysm GREATER than 10 mm   []   Recent intracranial or intraspinal surgery within the last 3 months   []   Arterial puncture at a non-compressible site in the previous 7 days   []   Active internal bleeding   []   Acute bleeding tendency   []   Platelet count LESS than 100,000 for known hematological diseases such as leukemia, thrombocytopenia or chronic cirrhosis   []   Current use of anticoagulant with INR GREATER than 1.7 or PT GREATER than 15 seconds, aPTT GREATER than 40 seconds   []   Heparin received within 48 hours, resulting in abnormally elevated aPTT GREATER than upper limit of normal   []   Current use of direct thrombin inhibitors or direct factor Xa inhibitors in the past 48 hours   []   Elevated blood pressure refractory to treatment (systolic GREATER than 185 mm/Hg or diastolic  GREATER than 110 mm/Hg   []   Suspected infective endocarditis and aortic arch dissection   []   Current use of therapeutic treatment dose of low-molecular-weight heparin (LMWH) within the previous 24 hours   []   Structural GI malignancy or bleed   Relative exclusion for all patients  [x]   Only minor non-disabling symptoms   []   Pregnancy   []   Seizure at onset with postictal residual neurological impairments   []   Major surgery or previous trauma within past 14 days   []   History of previous spontaneous ICH, intracranial neoplasm, or AV malformation   []   Postpartum (within previous 14 days)   []   Recent GI or urinary tract hemorrhage (within previous 21 days)   []   Recent acute MI (within previous 3 months)   []   History of un-ruptured intracranial aneurysm LESS than 10 mm   []   History of ruptured intracranial aneurysm   []   Blood glucose LESS than 50 mg/dL (2.7  mmol/L)   []   Dural puncture within the last 7 days   []   Known GREATER than 10 cerebral microbleeds   Additional exclusions for patients with symptoms onset between 3 and 4.5 hours.  []   Age > 80.   []   On any anticoagulants regardless of INR  >>> Warfarin (Coumadin), Heparin, Enoxaparin (Lovenox), fondaparinux (Arixtra), bivalirudin (Angiomax), Argatroban, dabigatran (Pradaxa), rivaroxaban (Xarelto), or apixaban (Eliquis)   []   Severe stroke (NIHSS > 25).   []   History of BOTH diabetes and previous ischemic stroke.   []   The risks and benefits have been discussed with the patient or family related to the administration of IV Alteplase for stroke symptoms.   []   I have discussed and reviewed the patient's case and imaging with the attending prior to IV Alteplase.    Time Alteplase administered       Hospital Meds:  Scheduled- aspirin, 325 mg, Oral, Once  atorvastatin, 80 mg, Oral, Daily  clopidogrel, 300 mg, Oral, Once      Infusions- niCARdipine, 5-15 mg/hr, Last Rate: 5 mg/hr (21 1138)       PRNs- sodium chloride    Functional Status Prior to Current Stroke/Emmons Score: 0    NIH Stroke Scale  Time: 14:22 CST  Person Administering Scale: Jean Tierney MD    1a  Level of consciousness: 0=alert; keenly responsive   1b. LOC questions:  0=Performs both tasks correctly   1c. LOC commands: 0=Performs both tasks correctly   2.  Best Gaze: 0=normal   3.  Visual: 0=No visual loss   4. Facial Palsy: 1=Minor paralysis (flattened nasolabial fold, asymmetric on smiling)   5a.  Motor left arm: 0=No drift, limb holds 90 (or 45) degrees for full 10 seconds   5b.  Motor right arm: 0=No drift, limb holds 90 (or 45) degrees for full 10 seconds   6a. motor left le=No drift, limb holds 90 (or 45) degrees for full 10 seconds   6b  Motor right le=Drift, limb holds 90 (or 45) degrees but drifts down before full 10 seconds: does not hit bed   7. Limb Ataxia: 0=Absent   8.  Sensory: 0=Normal; no sensory loss   9.  Best Language:  0=No aphasia, normal   10. Dysarthria: 0=Normal   11. Extinction and Inattention: 0=No abnormality    Total:   2       Results Reviewed:  I have personally reviewed current lab, radiology, and data   CT head shows no acute changes, no hemorrhage  MRI brain shows no acute left pontine stroke, mild to moderate white matter disease, right frontal region flair hyperintensity, no hemorrhage  Reviewed all her labs as well.              Assessment/Plan:  75-year-old right-handed white female with known diagnosis of hypertension, but on as needed medicines, hyperlipidemia, who was brought to the hospital for slurred speech.  On exam patient has very subtle right facial asymmetry and mild drift in the right lower extremity.  Imaging shows her to have an acute left pontine stroke.  Her blood pressures were in 200s in the ER      1. Left pontine stroke.  Likely lacunar in etiology.  Patient describes her blood pressure being in 200s most of the time, she does not check her blood pressures at home, and she is only on as needed blood pressure medications.  Will recommend to get CT angiogram of head and neck, start her on aspirin 325 mg daily, Plavix 300 mg loading dose today and 75 mg from tomorrow, Lipitor 80 mg daily for secondary stroke prevention.  Keep her systolic blood pressure in 140s to 180s range for today.  IV fluids.  2D echocardiogram.  2. Essential hypertension.  Patient likely has significantly uncontrolled blood pressure, but not optimally treated.  She will need a good treatment regimen for her blood pressure, and she will need to check her blood pressure on a daily basis.  Goal systolic blood pressure long-term is less than 140.  3. Sinus bradycardia.  Patient was noted to have heart rate in 40s, now in 60s.  Recommend to closely monitor the same.  If has low heart rate again, recommend cardiology consult for possible pacemaker placement.  4. Mixed hyperlipidemia.  We will check her lipid panel.   Recommend full dose of statins.  5. Keep her bedrest for today, PT/OT can work with her tomorrow if no worsening of the symptoms.  6. Healthy heart diet, once cleared swallow evaluation.    Case was discussed with patient, nursing and the ER physician.  All the risk factors were discussed, and appropriate education was given.  Thank you for the consult.          Jean Tierney MD  March 7, 2021  14:22 CST    Verbal consent taken.  Patient agreeable to be seen via telemedicine.    This was an audio and video enabled telemedicine encounter.

## 2021-03-08 ENCOUNTER — APPOINTMENT (OUTPATIENT)
Dept: CARDIOLOGY | Facility: HOSPITAL | Age: 76
End: 2021-03-08

## 2021-03-08 LAB
ANION GAP SERPL CALCULATED.3IONS-SCNC: 7 MMOL/L (ref 5–15)
BASOPHILS # BLD AUTO: 0.05 10*3/MM3 (ref 0–0.2)
BASOPHILS NFR BLD AUTO: 0.5 % (ref 0–1.5)
BH CV ECHO MEAS - ACS: 2.3 CM
BH CV ECHO MEAS - AO MAX PG (FULL): 9.3 MMHG
BH CV ECHO MEAS - AO MAX PG: 19.5 MMHG
BH CV ECHO MEAS - AO MEAN PG (FULL): 3 MMHG
BH CV ECHO MEAS - AO MEAN PG: 9 MMHG
BH CV ECHO MEAS - AO ROOT AREA (BSA CORRECTED): 1.9
BH CV ECHO MEAS - AO ROOT AREA: 8.6 CM^2
BH CV ECHO MEAS - AO ROOT DIAM: 3.3 CM
BH CV ECHO MEAS - AO V2 MAX: 221 CM/SEC
BH CV ECHO MEAS - AO V2 MEAN: 142 CM/SEC
BH CV ECHO MEAS - AO V2 VTI: 44.3 CM
BH CV ECHO MEAS - ASC AORTA: 3.1 CM
BH CV ECHO MEAS - AVA(I,A): 2.2 CM^2
BH CV ECHO MEAS - AVA(I,D): 2.2 CM^2
BH CV ECHO MEAS - AVA(V,A): 2.3 CM^2
BH CV ECHO MEAS - AVA(V,D): 2.3 CM^2
BH CV ECHO MEAS - BSA(HAYCOCK): 1.8 M^2
BH CV ECHO MEAS - BSA: 1.7 M^2
BH CV ECHO MEAS - BZI_BMI: 30.8 KILOGRAMS/M^2
BH CV ECHO MEAS - BZI_METRIC_HEIGHT: 154.9 CM
BH CV ECHO MEAS - BZI_METRIC_WEIGHT: 73.9 KG
BH CV ECHO MEAS - EDV(CUBED): 70.4 ML
BH CV ECHO MEAS - EDV(MOD-SP2): 59.9 ML
BH CV ECHO MEAS - EDV(MOD-SP4): 59.4 ML
BH CV ECHO MEAS - EDV(TEICH): 75.5 ML
BH CV ECHO MEAS - EF(CUBED): 92.7 %
BH CV ECHO MEAS - EF(MOD-SP2): 75.1 %
BH CV ECHO MEAS - EF(MOD-SP4): 76.1 %
BH CV ECHO MEAS - EF(TEICH): 88.4 %
BH CV ECHO MEAS - ESV(CUBED): 5.2 ML
BH CV ECHO MEAS - ESV(MOD-SP2): 14.9 ML
BH CV ECHO MEAS - ESV(MOD-SP4): 14.2 ML
BH CV ECHO MEAS - ESV(TEICH): 8.8 ML
BH CV ECHO MEAS - FS: 58.1 %
BH CV ECHO MEAS - IVS/LVPW: 1
BH CV ECHO MEAS - IVSD: 1.4 CM
BH CV ECHO MEAS - LA DIMENSION: 4 CM
BH CV ECHO MEAS - LA/AO: 1.2
BH CV ECHO MEAS - LV DIASTOLIC VOL/BSA (35-75): 34.3 ML/M^2
BH CV ECHO MEAS - LV MASS(C)D: 221.3 GRAMS
BH CV ECHO MEAS - LV MASS(C)DI: 127.8 GRAMS/M^2
BH CV ECHO MEAS - LV MAX PG: 10.2 MMHG
BH CV ECHO MEAS - LV MEAN PG: 6 MMHG
BH CV ECHO MEAS - LV SYSTOLIC VOL/BSA (12-30): 8.2 ML/M^2
BH CV ECHO MEAS - LV V1 MAX: 160 CM/SEC
BH CV ECHO MEAS - LV V1 MEAN: 110 CM/SEC
BH CV ECHO MEAS - LV V1 VTI: 30.7 CM
BH CV ECHO MEAS - LVIDD: 4.1 CM
BH CV ECHO MEAS - LVIDS: 1.7 CM
BH CV ECHO MEAS - LVLD AP2: 8.1 CM
BH CV ECHO MEAS - LVLD AP4: 7.9 CM
BH CV ECHO MEAS - LVLS AP2: 6.5 CM
BH CV ECHO MEAS - LVLS AP4: 7.4 CM
BH CV ECHO MEAS - LVOT AREA (M): 3.1 CM^2
BH CV ECHO MEAS - LVOT AREA: 3.1 CM^2
BH CV ECHO MEAS - LVOT DIAM: 2 CM
BH CV ECHO MEAS - LVPWD: 1.4 CM
BH CV ECHO MEAS - MV A MAX VEL: 164 CM/SEC
BH CV ECHO MEAS - MV DEC SLOPE: 444 CM/SEC^2
BH CV ECHO MEAS - MV E MAX VEL: 94.1 CM/SEC
BH CV ECHO MEAS - MV E/A: 0.57
BH CV ECHO MEAS - MV MAX PG: 12.7 MMHG
BH CV ECHO MEAS - MV MEAN PG: 3 MMHG
BH CV ECHO MEAS - MV P1/2T MAX VEL: 112 CM/SEC
BH CV ECHO MEAS - MV P1/2T: 73.9 MSEC
BH CV ECHO MEAS - MV V2 MAX: 178 CM/SEC
BH CV ECHO MEAS - MV V2 MEAN: 76.2 CM/SEC
BH CV ECHO MEAS - MV V2 VTI: 44.5 CM
BH CV ECHO MEAS - MVA P1/2T LCG: 2 CM^2
BH CV ECHO MEAS - MVA(P1/2T): 3 CM^2
BH CV ECHO MEAS - MVA(VTI): 2.2 CM^2
BH CV ECHO MEAS - PA MAX PG: 5.3 MMHG
BH CV ECHO MEAS - PA V2 MAX: 115 CM/SEC
BH CV ECHO MEAS - RAP SYSTOLE: 5 MMHG
BH CV ECHO MEAS - RVDD: 2.6 CM
BH CV ECHO MEAS - RVSP: 30.2 MMHG
BH CV ECHO MEAS - SI(AO): 218.8 ML/M^2
BH CV ECHO MEAS - SI(CUBED): 37.7 ML/M^2
BH CV ECHO MEAS - SI(LVOT): 55.7 ML/M^2
BH CV ECHO MEAS - SI(MOD-SP2): 26 ML/M^2
BH CV ECHO MEAS - SI(MOD-SP4): 26.1 ML/M^2
BH CV ECHO MEAS - SI(TEICH): 38.5 ML/M^2
BH CV ECHO MEAS - SV(AO): 378.9 ML
BH CV ECHO MEAS - SV(CUBED): 65.3 ML
BH CV ECHO MEAS - SV(LVOT): 96.4 ML
BH CV ECHO MEAS - SV(MOD-SP2): 45 ML
BH CV ECHO MEAS - SV(MOD-SP4): 45.2 ML
BH CV ECHO MEAS - SV(TEICH): 66.7 ML
BH CV ECHO MEAS - TR MAX VEL: 251 CM/SEC
BILIRUB UR QL STRIP: NEGATIVE
BUN SERPL-MCNC: 20 MG/DL (ref 8–23)
BUN/CREAT SERPL: 25.3 (ref 7–25)
CALCIUM SPEC-SCNC: 11.2 MG/DL (ref 8.6–10.5)
CHLORIDE SERPL-SCNC: 110 MMOL/L (ref 98–107)
CHOLEST SERPL-MCNC: 162 MG/DL (ref 0–200)
CLARITY UR: CLEAR
CO2 SERPL-SCNC: 24 MMOL/L (ref 22–29)
COLOR UR: YELLOW
CREAT SERPL-MCNC: 0.79 MG/DL (ref 0.57–1)
DEPRECATED RDW RBC AUTO: 43.7 FL (ref 37–54)
EOSINOPHIL # BLD AUTO: 0.13 10*3/MM3 (ref 0–0.4)
EOSINOPHIL NFR BLD AUTO: 1.4 % (ref 0.3–6.2)
ERYTHROCYTE [DISTWIDTH] IN BLOOD BY AUTOMATED COUNT: 13.5 % (ref 12.3–15.4)
GFR SERPL CREATININE-BSD FRML MDRD: 71 ML/MIN/1.73
GLUCOSE SERPL-MCNC: 77 MG/DL (ref 65–99)
GLUCOSE UR STRIP-MCNC: NEGATIVE MG/DL
HBA1C MFR BLD: 6.2 % (ref 4.8–5.6)
HCT VFR BLD AUTO: 43 % (ref 34–46.6)
HDLC SERPL-MCNC: 56 MG/DL (ref 40–60)
HGB BLD-MCNC: 14.3 G/DL (ref 12–15.9)
HGB UR QL STRIP.AUTO: NEGATIVE
IMM GRANULOCYTES # BLD AUTO: 0.05 10*3/MM3 (ref 0–0.05)
IMM GRANULOCYTES NFR BLD AUTO: 0.5 % (ref 0–0.5)
KETONES UR QL STRIP: NEGATIVE
LDLC SERPL CALC-MCNC: 81 MG/DL (ref 0–100)
LDLC/HDLC SERPL: 1.38 {RATIO}
LEUKOCYTE ESTERASE UR QL STRIP.AUTO: NEGATIVE
LV EF 2D ECHO EST: 61 %
LYMPHOCYTES # BLD AUTO: 2.33 10*3/MM3 (ref 0.7–3.1)
LYMPHOCYTES NFR BLD AUTO: 25.2 % (ref 19.6–45.3)
MAXIMAL PREDICTED HEART RATE: 147 BPM
MCH RBC QN AUTO: 29.6 PG (ref 26.6–33)
MCHC RBC AUTO-ENTMCNC: 33.3 G/DL (ref 31.5–35.7)
MCV RBC AUTO: 89 FL (ref 79–97)
MONOCYTES # BLD AUTO: 1.07 10*3/MM3 (ref 0.1–0.9)
MONOCYTES NFR BLD AUTO: 11.6 % (ref 5–12)
NEUTROPHILS NFR BLD AUTO: 5.61 10*3/MM3 (ref 1.7–7)
NEUTROPHILS NFR BLD AUTO: 60.8 % (ref 42.7–76)
NITRITE UR QL STRIP: NEGATIVE
NRBC BLD AUTO-RTO: 0 /100 WBC (ref 0–0.2)
PH UR STRIP.AUTO: 7.5 [PH] (ref 5–9)
PLATELET # BLD AUTO: 237 10*3/MM3 (ref 140–450)
PMV BLD AUTO: 11.2 FL (ref 6–12)
POTASSIUM SERPL-SCNC: 3.8 MMOL/L (ref 3.5–5.2)
PROT UR QL STRIP: NEGATIVE
RBC # BLD AUTO: 4.83 10*6/MM3 (ref 3.77–5.28)
SODIUM SERPL-SCNC: 141 MMOL/L (ref 136–145)
SP GR UR STRIP: 1.01 (ref 1–1.03)
STRESS TARGET HR: 125 BPM
TRIGL SERPL-MCNC: 145 MG/DL (ref 0–150)
UROBILINOGEN UR QL STRIP: NORMAL
VLDLC SERPL-MCNC: 25 MG/DL (ref 5–40)
WBC # BLD AUTO: 9.24 10*3/MM3 (ref 3.4–10.8)

## 2021-03-08 PROCEDURE — 84443 ASSAY THYROID STIM HORMONE: CPT | Performed by: NURSE PRACTITIONER

## 2021-03-08 PROCEDURE — 80048 BASIC METABOLIC PNL TOTAL CA: CPT | Performed by: INTERNAL MEDICINE

## 2021-03-08 PROCEDURE — 92610 EVALUATE SWALLOWING FUNCTION: CPT | Performed by: SPEECH-LANGUAGE PATHOLOGIST

## 2021-03-08 PROCEDURE — 80061 LIPID PANEL: CPT | Performed by: NURSE PRACTITIONER

## 2021-03-08 PROCEDURE — 97166 OT EVAL MOD COMPLEX 45 MIN: CPT

## 2021-03-08 PROCEDURE — 25010000002 ENOXAPARIN PER 10 MG: Performed by: INTERNAL MEDICINE

## 2021-03-08 PROCEDURE — 92523 SPEECH SOUND LANG COMPREHEN: CPT | Performed by: SPEECH-LANGUAGE PATHOLOGIST

## 2021-03-08 PROCEDURE — 99233 SBSQ HOSP IP/OBS HIGH 50: CPT | Performed by: NURSE PRACTITIONER

## 2021-03-08 PROCEDURE — 84481 FREE ASSAY (FT-3): CPT | Performed by: NURSE PRACTITIONER

## 2021-03-08 PROCEDURE — 93306 TTE W/DOPPLER COMPLETE: CPT | Performed by: INTERNAL MEDICINE

## 2021-03-08 PROCEDURE — 84439 ASSAY OF FREE THYROXINE: CPT | Performed by: NURSE PRACTITIONER

## 2021-03-08 PROCEDURE — 93306 TTE W/DOPPLER COMPLETE: CPT

## 2021-03-08 PROCEDURE — 97162 PT EVAL MOD COMPLEX 30 MIN: CPT

## 2021-03-08 PROCEDURE — 83036 HEMOGLOBIN GLYCOSYLATED A1C: CPT | Performed by: NURSE PRACTITIONER

## 2021-03-08 PROCEDURE — 85025 COMPLETE CBC W/AUTO DIFF WBC: CPT | Performed by: INTERNAL MEDICINE

## 2021-03-08 PROCEDURE — 81003 URINALYSIS AUTO W/O SCOPE: CPT | Performed by: INTERNAL MEDICINE

## 2021-03-08 PROCEDURE — 36415 COLL VENOUS BLD VENIPUNCTURE: CPT | Performed by: INTERNAL MEDICINE

## 2021-03-08 RX ORDER — ASPIRIN 325 MG
325 TABLET ORAL DAILY
Status: DISCONTINUED | OUTPATIENT
Start: 2021-03-08 | End: 2021-03-12 | Stop reason: HOSPADM

## 2021-03-08 RX ORDER — LOSARTAN POTASSIUM 50 MG/1
50 TABLET ORAL
Status: DISCONTINUED | OUTPATIENT
Start: 2021-03-08 | End: 2021-03-09

## 2021-03-08 RX ORDER — CLOPIDOGREL BISULFATE 75 MG/1
75 TABLET ORAL DAILY
Status: DISCONTINUED | OUTPATIENT
Start: 2021-03-08 | End: 2021-03-12 | Stop reason: HOSPADM

## 2021-03-08 RX ORDER — ENALAPRILAT 2.5 MG/2ML
1.25 INJECTION INTRAVENOUS EVERY 6 HOURS PRN
Status: DISCONTINUED | OUTPATIENT
Start: 2021-03-08 | End: 2021-03-12 | Stop reason: HOSPADM

## 2021-03-08 RX ADMIN — SODIUM CHLORIDE 75 ML/HR: 900 INJECTION, SOLUTION INTRAVENOUS at 15:19

## 2021-03-08 RX ADMIN — ATORVASTATIN CALCIUM 80 MG: 40 TABLET, FILM COATED ORAL at 08:12

## 2021-03-08 RX ADMIN — SODIUM CHLORIDE, PRESERVATIVE FREE 10 ML: 5 INJECTION INTRAVENOUS at 08:13

## 2021-03-08 RX ADMIN — ENOXAPARIN SODIUM 40 MG: 40 INJECTION SUBCUTANEOUS at 16:23

## 2021-03-08 RX ADMIN — ASPIRIN 325 MG: 325 TABLET ORAL at 08:12

## 2021-03-08 RX ADMIN — SODIUM CHLORIDE 5 MG/HR: 9 INJECTION, SOLUTION INTRAVENOUS at 05:17

## 2021-03-08 RX ADMIN — LOSARTAN POTASSIUM 50 MG: 50 TABLET, FILM COATED ORAL at 10:09

## 2021-03-08 RX ADMIN — PANTOPRAZOLE SODIUM 40 MG: 40 INJECTION, POWDER, LYOPHILIZED, FOR SOLUTION INTRAVENOUS at 05:53

## 2021-03-08 RX ADMIN — ENALAPRILAT 1.25 MG: 1.25 INJECTION INTRAVENOUS at 19:05

## 2021-03-08 RX ADMIN — CLOPIDOGREL BISULFATE 75 MG: 75 TABLET ORAL at 08:12

## 2021-03-08 NOTE — PLAN OF CARE
Goal Outcome Evaluation:  Plan of Care Reviewed With: patient     Outcome Summary: initial OT evaluation complete. pt was pleasant and cooperative throughout. oriented x 4. complete sup to sit bed mobility with supervision, increased time required for task completion. was dependent for donning socks. min A for donning/doffing hospital gown while sitting EOB. completed sit to stand transfers x 2 with min A/no RW. completed bed to chair transfer with min A. hand held assistance required. pt reaching for furniture with mobility and transfers. ambulated at bedside with min A. poor balance upon initial standing, but improved somewhat with increased time and time in standing. recommended use of RW to pt as pt balance is decreased at this time. BUE ROM WFL grossly. BUE strength and bilateral  strength are grossly 4/5. strength is equal on both sides. no coordination deficits identified. recommend further skilled OT services to address functional mobility and ADL deficits, as well as balance, strength, and endurance. recommend home with 24/7 care at discharge. spoke with pt about possibly having daugther stay with her once she gets home. goals established.

## 2021-03-08 NOTE — PLAN OF CARE
Problem: Adult Inpatient Plan of Care  Goal: Plan of Care Review  Outcome: Ongoing, Progressing  Flowsheets (Taken 3/8/2021 0939)  Progress: improving  Plan of Care Reviewed With: patient  Outcome Summary: ST evaluation completed this date. Pt passed the nursing swallow assessment and was started on a regular diet and thin liquids. Pt was finishing am meal upon SLP arrival. Pt completed meal with no concerns. No s/s of aspiration noted. Pt stated that she had no diet restrictions from home. Pt lives alone. Slurred speech was noted upon pt arrival in ED but that has since resolved. Pt was administered the SLUMs with a score of 28/30. No concerns noted for cognition, speech, or language. This is an evaluation only. Continue current diet of regular textures and thin liquids. Pt in agreement. RN aware.   Goal Outcome Evaluation:  Plan of Care Reviewed With: patient  Progress: improving  Outcome Summary: ST evaluation completed this date. Pt passed the nursing swallow assessment and was started on a regular diet and thin liquids. Pt was finishing am meal upon SLP arrival. Pt completed meal with no concerns. No s/s of aspiration noted. Pt stated that she had no diet restrictions from home. Pt lives alone. Slurred speech was noted upon pt arrival in ED but that has since resolved. Pt was administered the SLUMs with a score of 28/30. No concerns noted for cognition, speech, or language. This is an evaluation only. Continue current diet of regular textures and thin liquids. Pt in agreement. RN aware.

## 2021-03-08 NOTE — THERAPY EVALUATION
Patient Name: Josi Roe  : 1945    MRN: [id number]                              Today's Date: 3/8/2021     PT Eval   Admit Date: 3/7/2021    Visit Dx:     ICD-10-CM ICD-9-CM   1. Cerebrovascular accident (CVA), unspecified mechanism (CMS/HCC)  I63.9 434.91   2. Hypertensive emergency  I16.1 401.9   3. Impaired mobility and ADLs  Z74.09 V49.89    Z78.9    4. Impaired functional mobility, balance, gait, and endurance  Z74.09 V49.89     Patient Active Problem List   Diagnosis   • Cerebrovascular accident (CVA) (CMS/HCC)     Past Medical History:   Diagnosis Date   • Heart murmur    • Hypertension      Past Surgical History:   Procedure Laterality Date   • CHOLECYSTECTOMY     • CLUB FOOT RELEASE     • HYSTERECTOMY       General Information     Row Name 21 1111          Physical Therapy Time and Intention    Document Type  evaluation  -GB     Mode of Treatment  individual therapy;physical therapy  -GB     Row Name 21 1111          General Information    Patient Profile Reviewed  yes  -GB     Prior Level of Function  independent:;ADL's;bathing;dressing;cooking;cleaning;shopping  -GB     Existing Precautions/Restrictions  fall  -GB     Row Name 21 1111          Living Environment    Lives With  alone  -GB     Row Name 21 1111          Home Main Entrance    Number of Stairs, Main Entrance  none  -GB     Row Name 21 1111          Stairs Within Home, Primary    Number of Stairs, Within Home, Primary  none  -GB     Row Name 21 1111          Cognition    Orientation Status (Cognition)  oriented x 4  -GB     Row Name 21 1111          Safety Issues, Functional Mobility    Safety Issues Affecting Function (Mobility)  awareness of need for assistance;safety precaution awareness  -GB     Impairments Affecting Function (Mobility)  endurance/activity tolerance;balance;coordination  -GB     Comment, Safety Issues/Impairments (Mobility)  lets walker go too far ahead and picks it  up for turns, leaves it aside away from chair befoer she backs up to the chair  -       User Key  (r) = Recorded By, (t) = Taken By, (c) = Cosigned By    Initials Name Provider Type    Mounika Rangel, PT Physical Therapist        Mobility     Row Name 03/08/21 1156          Bed Mobility    Supine-Sit [place] (Bed Mobility)  not tested  -     Row Name 03/08/21 1156          Sit-Stand Transfer    Sit-Stand [place] (Transfers)  independent;supervision  -     Row Name 03/08/21 1156          Gait/Stairs (Locomotion)    [place] Level (Gait)  contact guard;1 person assist;nonverbal cues (demo/gesture);verbal cues  -     Assistive Device (Gait)  walker, front-wheeled  -     Distance in Feet (Gait)  66 ft needing cuing to keep walker close and functional in mobility. 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 She denies falls often; walkes indep w/out AD most of time  -     Row Name 03/08/21 1126          Strength Comprehensive (MMT)    Comment, General Manual Muscle Testing (MMT) Assessment  muna LEs rodrigo resistance at 4- to 4/5, R weaker than L. She has significant crepitus muna LEs L>R at knees and feet so resistance was guarded to avoid joint injury; states she doesn't have much pain there usually  -     Row Name 03/08/21 1126          Balance    Balance Assessment  sitting static balance;sitting dynamic balance  -     Static Sitting Balance  WFL  -GB     Dynamic Sitting Balance  WFL  -GB     Static Standing Balance  mild  impairment  -GB     Dynamic Standing Balance  mild impairment  -GB     Row Name 03/08/21 1126          Sensory Assessment (Somatosensory)    Sensory Assessment (Somatosensory)  sensation intact;LE sensation intact  -GB       User Key  (r) = Recorded By, (t) = Taken By, (c) = Cosigned By    Initials Name Provider Type    GB Mounika Anand, PT Physical Therapist        Goals/Plan     Row Name 03/08/21 1130          Bed Mobility Goal 1 (PT)    Activity/Assistive Device (Bed Mobility Goal 1, PT)  bed mobility activities, all  -GB     [place] Level/Cues Needed (Bed Mobility Goal 1, PT)  independent;modified independence  -GB     Time Frame (Bed Mobility Goal 1, PT)  by discharge  -GB     Progress/Outcomes (Bed Mobility Goal 1, PT)  goal not met  -GB     Row Name 03/08/21 1130          Transfer Goal 1 (PT)    Activity/Assistive Device (Transfer Goal 1, PT)  bed-to-chair/chair-to-bed  -GB     [place] Level/Cues Needed (Transfer Goal 1, PT)  independent;modified independence  -GB     Time Frame (Transfer Goal 1, PT)  by discharge  -GB     Progress/Outcome (Transfer Goal 1, PT)  goal not met  -     Row Name 03/08/21 1130          Gait Training Goal 1 (PT)    Activity/Assistive Device (Gait Training Goal 1, PT)  gait (walking locomotion);assistive device use  -GB     [place] Level (Gait Training Goal 1, PT)  modified independence  -GB     Distance (Gait Training Goal 1, PT)  150 ft or more  -GB     Time Frame (Gait Training Goal 1, PT)  by discharge  -GB     Row Name 03/08/21 1130          Stairs Goal 1 (PT)    Activity/Assistive Device (Stairs Goal 1, PT)  ascending stairs;descending stairs  -GB     [place] Level/Cues Needed (Stairs Goal 1, PT)  modified independence;independent  -GB     Number of Stairs (Stairs Goal 1, PT)  2 or more  -GB     Time Frame (Stairs Goal 1, PT)  by discharge  -GB     Progress/Outcome (Stairs Goal 1, PT)  goal not met  -     Row Name 03/08/21 1133           Patient Education Goal (PT)    Activity (Patient Education Goal, PT)  pt will be skilled w/ use of FWRW for safety  -GB     [place]/Cues/Accuracy (Memory Goal 2, PT)  demonstrates adequately  -GB     Time Frame (Patient Education Goal, PT)  by discharge  -GB     Progress/Outcome (Patient Education Goal, PT)  goal not met  -GB       User Key  (r) = Recorded By, (t) = Taken By, (c) = Cosigned By    Initials Name Provider Type    GB Mounika Anand, PT Physical Therapist        Clinical Impression     Row Name 03/08/21 1114          Pain Scale: Numbers Pre/Post-Treatment    Pretreatment Pain Rating  0/10 - no pain  -GB     Posttreatment Pain Rating  0/10 - no pain  -GB     Row Name 03/08/21 1114          Plan of Care Review    Plan of Care Reviewed With  patient  -GB     Progress  no change  -GB     Outcome Summary  PT eval completed at bedside. Pt previously indep in home and community mobility & ADLs. Dtr local but not close by but can stay w/ her some of the time if d/c home per pt. Pt able to move w/out obligation to abnormal tone but w/ hx of muna club feet she has some unsteadiness noted in gait that could be premorbid as well as CVA/TIA  related. She moved all 4 E'x w/out abnormal tone. Sit<>Stand, gait w/ supervision/CGA 1 for stabilty as needed. She used FWRW but did not keep it close to feet as requested, pushing it at least 1 fot ahead of her putting her in full fwd flx. She was cued 3 times to keep walker closer than out at arms length. She has hx club foot sx muna as child and feet very small so her base of support may be a functional issue as well for gait. She reports yesterday her R side was weaker and this is noted at 4-/5.Because she prefers living alone and does not have dtr close by, may benefit from IRF prior to home or rehab to home program. She is asked to consider her duties at home and possible hazards so we may address them while here. If home, rec 24/7 care and follow up therapy  either HH or outpt what ever best for pt.  -GB     Row Name 03/08/21 1114          Therapy Assessment/Plan (PT)    Patient/Family Therapy Goals Statement (PT)  home alone; dtr lives in adjacent area but not close enough to walk,  -GB     Rehab Potential (PT)  good, to achieve stated therapy goals  -GB     Criteria for Skilled Interventions Met (PT)  yes  -GB     Predicted Duration of Therapy Intervention (PT)  2-4 d inpt, 3-5 wks outpt prn  -GB     Row Name 03/08/21 1114          Vital Signs    Pre Systolic BP Rehab  150  -GB     Pre Treatment Diastolic BP  72  -GB     Intra Systolic BP Rehab  180  -GB     Intra Treatment Diastolic BP  83  -GB     Post Systolic BP Rehab  157  -GB     Post Treatment Diastolic BP  69  -GB     Pretreatment Heart Rate (beats/min)  70  -GB     Posttreatment Heart Rate (beats/min)  57  -GB     Pre SpO2 (%)  99  -GB     O2 Delivery Pre Treatment  room air  -GB     Post SpO2 (%)  99  -GB     O2 Delivery Post Treatment  room air  -GB     Pre Patient Position  Sitting  -GB     Intra Patient Position  Standing  -GB     Post Patient Position  Sitting  -GB     Row Name 03/08/21 1114          Positioning and Restraints    Pre-Treatment Position  sitting in chair/recliner  -GB     Post Treatment Position  chair  -GB     In Chair  call light within reach;encouraged to call for assist;reclined;sitting  -GB       User Key  (r) = Recorded By, (t) = Taken By, (c) = Cosigned By    Initials Name Provider Type    GB Mounika Anand, PT Physical Therapist        Outcome Measures     Row Name 03/08/21 1257          How much help from another person do you currently need...    Turning from your back to your side while in flat bed without using bedrails?  3  -GB     Moving from lying on back to sitting on the side of a flat bed without bedrails?  3  -GB     Moving to and from a bed to a chair (including a wheelchair)?  3  -GB     Standing up from a chair using your arms (e.g., wheelchair, bedside  chair)?  3  -GB     Climbing 3-5 steps with a railing?  3  -GB     To walk in hospital room?  3  -GB     AM-PAC 6 Clicks Score (PT)  18  -GB     Row Name 03/08/21 1257          Modified Edwin Scale    Pre-Stroke Modified Edwin Scale  0 - No Symptoms at all.  -GB     Modified Watson Scale  4 - Moderately severe disability.  Unable to walk without assistance, and unable to attend to own bodily needs without assistance.  -GB     Row Name 03/08/21 1257          Functional Assessment    Outcome Measure Options  Modified [place];AM-PAC 6 Clicks Basic Mobility (PT)  -       User Key  (r) = Recorded By, (t) = Taken By, (c) = Cosigned By    Initials Name Provider Type    Mounika Rangel PT Physical Therapist        Physical Therapy Education                 Title: PT OT SLP Therapies (In Progress)     Topic: Physical Therapy (Done)     Point: Mobility training (Done)     Learning Progress Summary           Patient Acceptance, D,E, VU,NR by  at 3/8/2021 1258    Comment: POC, need for more control in use of FWRW, rec assist at home if d/c home; may benefit from skilled care before home                   Point: Home exercise program (Done)     Learning Progress Summary           Patient Acceptance, D,E, VU,NR by  at 3/8/2021 1258    Comment: POC, need for more control in use of FWRW, rec assist at home if d/c home; may benefit from skilled care before home                   Point: Body mechanics (Done)     Learning Progress Summary           Patient Acceptance, D,E, VU,NR by VASHTI at 3/8/2021 1258    Comment: POC, need for more control in use of FWRW, rec assist at home if d/c home; may benefit from skilled care before home                   Point: Precautions (Done)     Learning Progress Summary           Patient Acceptance, D,E, VU,NR by  at 3/8/2021 1258    Comment: POC, need for more control in use of FWRW, rec assist at home if d/c home; may benefit from skilled care before home                                User Key     Initials Effective Dates Name Provider Type Discipline     04/03/18 -  Mounika Anand, PT Physical Therapist PT              PT Recommendation and Plan  Planned Therapy Interventions (PT): bed mobility training, balance training, neuromuscular re-education, motor coordination training, home exercise program, gait training, transfer training, strengthening, stair training  Plan of Care Reviewed With: patient  Progress: no change  Outcome Summary: PT eval completed at bedside. Pt previously indep in home and community mobility & ADLs. Dtr local but not close by but can stay w/ her some of the time if d/c home per pt. Pt able to move w/out obligation to abnormal tone but w/ hx of muna club feet she has some unsteadiness noted in gait that could be premorbid as well as CVA/TIA  related. She moved all 4 E'x w/out abnormal tone. Sit<>Stand, gait w/ supervision/CGA 1 for stabilty as needed. She used FWRW but did not keep it close to feet as requested, pushing it at least 1 fot ahead of her putting her in full fwd flx. She was cued 3 times to keep walker closer than out at arms length. She has hx club foot sx muna as child and feet very small so her base of support may be a functional issue as well for gait. She reports yesterday her R side was weaker and this is noted at 4-/5.Because she prefers living alone and does not have dtr close by, may benefit from IRF prior to home or rehab to home program. She is asked to consider her duties at home and possible hazards so we may address them while here. If home, rec 24/7 care and follow up therapy either HH or outpt what ever best for pt.     Time Calculation:   PT Charges     Row Name 03/08/21 5109             Time Calculation    Start Time  1111  -GB      Stop Time  1204  -GB      Time Calculation (min)  53 min  -GB      PT Received On  03/08/21  -GB      PT Goal Re-Cert Due Date  03/17/21  -GB        User Key  (r) = Recorded By, (t) = Taken By,  (c) = Cosigned By    Initials Name Provider Type    Mounika Rangel, PT Physical Therapist        Therapy Charges for Today     Code Description Service Date Service Provider Modifiers Qty    [id number] HC PT EVAL MOD COMPLEXITY 4 3/8/2021 Mounika Anand, PT GP 1          PT G-Codes  Outcome Measure Options: Modified [place], AM-PAC 6 Clicks Basic Mobility (PT)  AM-PAC 6 Clicks Score (PT): 18  AM-PAC 6 Clicks Score (OT): 18  Modified [place] Scale: 4 - Moderately severe disability.  Unable to walk without assistance, and unable to attend to own bodily needs without assistance.    Mounika Anand, PT  3/8/2021

## 2021-03-08 NOTE — PROGRESS NOTES
Stroke Progress Note       Chief Complaint: Slurred speech    Subjective     HPI: Pt is a 75-year-old right-handed white female with known diagnosis of hypertension, on as needed medications, hyperlipidemia, who was brought in by her granddaughter for slurred speech. This morning she states feeling better. Strengths are equal 5/5. Denies weakness, numbness, or vision changes.       Review of Systems   HENT: Positive for hearing loss.    Eyes: Negative.    Respiratory: Negative.    Cardiovascular: Negative.    Gastrointestinal: Negative.    Genitourinary: Negative.    Musculoskeletal: Negative.    Neurological: Negative.    Hematological: Negative.    Psychiatric/Behavioral: Negative.         Objective      Temp:  [97.1 °F (36.2 °C)-98.4 °F (36.9 °C)] 97.1 °F (36.2 °C)  Heart Rate:  [36-84] 53  Resp:  [14-25] 18  BP: ()/() 170/77    Neurological Exam  Mental Status  Awake and alert. Oriented to person, place, time and situation. Recent and remote memory are intact. Speech is normal. Language is fluent with no aphasia. Attention and concentration are normal. Fund of knowledge is appropriate for level of education.    Cranial Nerves  CN II: Visual acuity is normal. Visual fields full to confrontation.  CN III, IV, VI: Extraocular movements intact bilaterally. Normal lids and orbits bilaterally. Pupils equal round and reactive to light bilaterally.  CN V: Facial sensation is normal.  CN VII: Full and symmetric facial movement.  CN IX, X: Palate elevates symmetrically. Normal gag reflex.  CN XI: Shoulder shrug strength is normal.  CN XII: Tongue midline without atrophy or fasciculations.    Motor  Normal muscle bulk throughout. No fasciculations present. Strength is 5/5 throughout all four extremities.    Sensory  Sensation is intact to light touch, pinprick, vibration and proprioception in all four extremities.    Reflexes  Not tested..    Coordination  Finger-to-nose, rapid alternating movements and  heel-to-shin normal bilaterally without dysmetria.    Gait  Not tested..      Physical Exam  Vitals and nursing note reviewed.   Constitutional:       General: She is awake.      Appearance: Normal appearance.   HENT:      Head: Normocephalic and atraumatic.   Eyes:      General: Lids are normal.      Extraocular Movements: Extraocular movements intact.      Pupils: Pupils are equal, round, and reactive to light.   Cardiovascular:      Rate and Rhythm: Normal rate and regular rhythm.   Musculoskeletal:         General: Normal range of motion.      Cervical back: Normal range of motion.   Neurological:      General: No focal deficit present.      Mental Status: She is alert and oriented to person, place, and time.      Coordination: Coordination is intact.      Deep Tendon Reflexes: Strength normal.   Psychiatric:         Mood and Affect: Mood normal.         Speech: Speech normal.         Results Review:    I reviewed the patient's new clinical results.    Lab Results (last 24 hours)     Procedure Component Value Units Date/Time    Lipid Panel [[id number]] Collected: 03/08/21 0311    Specimen: Blood Updated: 03/08/21 0716     Total Cholesterol 162 mg/dL      Triglycerides 145 mg/dL      HDL Cholesterol 56 mg/dL      LDL Cholesterol  81 mg/dL      VLDL Cholesterol 25 mg/dL      LDL/HDL Ratio 1.38    Narrative:      Cholesterol Reference Ranges  (U.S. Department of Health and Human Services ATP III Classifications)    Desirable          <200 mg/dL  Borderline High    200-239 mg/dL  High Risk          >240 mg/dL      Triglyceride Reference Ranges  (U.S. Department of Health and Human Services ATP III Classifications)    Normal           <150 mg/dL  Borderline High  150-199 mg/dL  High             200-499 mg/dL  Very High        >500 mg/dL    HDL Reference Ranges  (U.S. Department of Health and Human Services ATP III Classifcations)    Low     <40 mg/dl (major risk factor for CHD)  High    >60 mg/dl ('negative' risk  factor for CHD)        LDL Reference Ranges  (U.S. Department of Health and Human Services ATP III Classifcations)    Optimal          <100 mg/dL  Near Optimal     100-129 mg/dL  Borderline High  130-159 mg/dL  High             160-189 mg/dL  Very High        >189 mg/dL    Hemoglobin A1c [[id number]]  (Abnormal) Collected: 03/08/21 0311    Specimen: Blood Updated: 03/08/21 0710     Hemoglobin A1C 6.20 %     Narrative:      Hemoglobin A1C Ranges:    Increased Risk for Diabetes  5.7% to 6.4%  Diabetes                     >= 6.5%  Diabetic Goal                < 7.0%    Urinalysis With Microscopic If Indicated (No Culture) - Urine, Clean Catch [[id number]]  (Normal) Collected: 03/08/21 0519    Specimen: Urine, Clean Catch Updated: 03/08/21 0527     Color, UA Yellow     Appearance, UA Clear     pH, UA 7.5     Specific Gravity, UA 1.013     Glucose, UA Negative     Ketones, UA Negative     Bilirubin, UA Negative     Blood, UA Negative     Protein, UA Negative     Leuk Esterase, UA Negative     Nitrite, UA Negative     Urobilinogen, UA 0.2 E.U./dL    Narrative:      Urine microscopic not indicated.    Basic Metabolic Panel [[id number]]  (Abnormal) Collected: 03/08/21 0311    Specimen: Blood Updated: 03/08/21 0400     Glucose 77 mg/dL      BUN 20 mg/dL      Creatinine 0.79 mg/dL      Sodium 141 mmol/L      Potassium 3.8 mmol/L      Chloride 110 mmol/L      CO2 24.0 mmol/L      Calcium 11.2 mg/dL      eGFR Non African Amer 71 mL/min/1.73      BUN/Creatinine Ratio 25.3     Anion Gap 7.0 mmol/L     Narrative:      GFR Normal >60  Chronic Kidney Disease <60  Kidney Failure <15      CBC Auto Differential [[id number]]  (Abnormal) Collected: 03/08/21 0311    Specimen: Blood Updated: 03/08/21 0339     WBC 9.24 10*3/mm3      RBC 4.83 10*6/mm3      Hemoglobin 14.3 g/dL      Hematocrit 43.0 %      MCV 89.0 fL      MCH 29.6 pg      MCHC 33.3 g/dL      RDW 13.5 %      RDW-SD 43.7 fl      MPV 11.2 fL      Platelets 237 10*3/mm3       Neutrophil % 60.8 %      Lymphocyte % 25.2 %      Monocyte % 11.6 %      Eosinophil % 1.4 %      Basophil % 0.5 %      Immature Grans % 0.5 %      Neutrophils, Absolute 5.61 10*3/mm3      Lymphocytes, Absolute 2.33 10*3/mm3      Monocytes, Absolute 1.07 10*3/mm3      Eosinophils, Absolute 0.13 10*3/mm3      Basophils, Absolute 0.05 10*3/mm3      Immature Grans, Absolute 0.05 10*3/mm3      nRBC 0.0 /100 WBC         CT Head Without Contrast    Result Date: 3/7/2021  Old infarct left basal ganglia, caudate nucleus. Old lacunar infarct right thalamus. CT brain without contrast is otherwise unremarkable. There are no acute changes. Electronically signed by:  Favian Pittman MD  3/7/2021 11:25 AM CST Workstation: [id number]    MRI Brain Without Contrast    Result Date: 3/7/2021  CONCLUSION: Acute 1.5 cm infarct left side of the renny. Old 1.9 cm infarct anterior aspect of the left basal ganglia. Old thalamic and basal ganglia lacunar infarcts. Minimal small vessel disease. Cerebral atrophy. Report called at approximately 1:50 PM [facility]. [id number] Electronically signed by:  Carlos Keith MD  3/7/2021 1:38 PM CST Workstation: [id number] Chest 1 View    Result Date: 3/7/2021  No evidence of active disease. Electronically signed by:  Favian Pittman MD  3/7/2021 10:06 AM CST Workstation: [id number]    CT Angiogram Carotids    Result Date: 3/7/2021  Unremarkable CT angiography of the brain. Unremarkable CT angiography of the neck. Note is made of multiple bilateral thyroid nodules, the largest nodule seen in the right side and measuring 2.4 cm and can be evaluated electively with an ultrasound of the thyroid Electronically signed by:  Christian Davis MD  3/7/2021 4:43 PM CST Workstation: [id number]    CT Angiogram Head    Result Date: 3/7/2021  Unremarkable CT angiography of the brain. Unremarkable CT angiography of the neck. Note is made of multiple bilateral thyroid nodules, the largest nodule seen in the right side and measuring  2.4 cm and can be evaluated electively with an ultrasound of the thyroid Electronically signed by:  Christian Davis MD  3/7/2021 4:43 PM CST Workstation: [id number]-          Assessment/Plan     Assessment/Plan: Pt is a 75-year-old right-handed white female with known diagnosis of hypertension, on as needed medications, hyperlipidemia, who was brought in by her granddaughter for slurred speech. This morning she states feeling better. Strengths are equal 5/5. Denies weakness, numbness, or vision changes.   1. Left pontine stroke- Likely lacunar d/t uncontrolled hypertension. Continue aspirin 325 mg, Plavix 75 mg, and Lipitor 80 mg/day for secondary stroke prevention. Echo pending.  2. Essential hypertension- Currently on Cardene gtt. Will wean gtt today. Instructed on the importance of daily BP monitoring and follow-up with PCP to reduce stroke risk.  3. Hyperlipidemia- LDL 81, cont Lipitor 80 mg/day for secondary stroke prevention.  4. Sinus Bradycardia- Her HR is still in the 40's at times. Recommend continue monitoring and follow-up with cardiology.   5. Activity- Okay to work with PT/OT today.  6. Diet- Heart-healthy diet.   Case was discussed with pt, Dr. Potter, Hospitalist team, and nursing.           Patient Active Problem List   Diagnosis   • Cerebrovascular accident (CVA) (CMS/[facility])           FIDEL Klein  03/08/21  09:05 CST

## 2021-03-08 NOTE — PROGRESS NOTES
[facility] Medicine Services  INPATIENT PROGRESS NOTE    Length of Stay: 1  Date of Admission: 3/7/2021  Primary Care Physician: Debra Wilkes MD    Subjective   Chief Complaint: No new complaints.    HPI: Patient is seen for follow-up.  She is a 75-year-old female with history of hypertension and hearing loss who was admitted for left pontine stroke with hypertensive urgency.  She is doing better, tolerating diet, less deconditioned and her speech/right-sided weakness are improving.  She voices no new complaints.    Review of Systems   Constitutional: Positive for activity change and fatigue. Negative for appetite change, chills, diaphoresis and fever.   HENT: Positive for hearing loss. Negative for trouble swallowing and voice change.    Eyes: Negative for photophobia and visual disturbance.   Respiratory: Negative for cough, choking, chest tightness, shortness of breath, wheezing and stridor.    Cardiovascular: Negative for chest pain, palpitations and leg swelling.   Gastrointestinal: Negative for abdominal distention, abdominal pain, blood in stool, constipation, diarrhea, nausea and vomiting.   Endocrine: Negative for cold intolerance, heat intolerance, polydipsia, polyphagia and polyuria.   Genitourinary: Negative for decreased urine volume, difficulty urinating, dysuria, enuresis, flank pain, frequency, hematuria and urgency.   Musculoskeletal: Negative for arthralgias, gait problem, myalgias, neck pain and neck stiffness.   Skin: Negative for pallor, rash and wound.   Neurological: Positive for facial asymmetry, speech difficulty and weakness. Negative for dizziness, tremors, seizures, syncope, light-headedness, numbness and headaches.   Hematological: Does not bruise/bleed easily.   Psychiatric/Behavioral: Negative for agitation, behavioral problems and confusion.       Objective    Temp:  [97.1 °F (36.2 °C)-97.8 °F (36.6 °C)] 97.1 °F (36.2 °C)  Heart  Rate:  [36-84] 72  Resp:  [14-25] 18  BP: ()/() 163/83    Physical Exam  Vitals and nursing note reviewed.   Constitutional:       General: She is not in acute distress.     Appearance: She is well-developed. She is not diaphoretic.   HENT:      Head: Normocephalic and atraumatic.      Comments: She is a little hard of hearing.     Right Ear: External ear normal.      Left Ear: External ear normal.      Nose: Nose normal.   Eyes:      Conjunctiva/sclera: Conjunctivae normal.      Pupils: Pupils are equal, round, and reactive to light.   Neck:      Thyroid: No thyromegaly.      Vascular: No JVD.   Cardiovascular:      Rate and Rhythm: Normal rate and regular rhythm.      Heart sounds: Normal heart sounds. No murmur. No friction rub. No gallop.    Pulmonary:      Effort: Pulmonary effort is normal. No respiratory distress.      Breath sounds: Normal breath sounds. No stridor. No wheezing or rales.   Chest:      Chest wall: No tenderness.   Abdominal:      General: Bowel sounds are normal. There is no distension.      Palpations: Abdomen is soft. There is no mass.      Tenderness: There is no abdominal tenderness. There is no guarding or rebound.      Hernia: No hernia is present.   Musculoskeletal:         General: No swelling, tenderness or deformity. Normal range of motion.      Cervical back: Normal range of motion and neck supple.      Right lower leg: No edema.   Skin:     General: Skin is warm and dry.      Coloration: Skin is not jaundiced or pale.      Findings: No erythema or rash.   Neurological:      Mental Status: She is alert and oriented to person, place, and time.      Cranial Nerves: Facial asymmetry present.      Sensory: No sensory deficit.      Motor: Weakness present.      Coordination: Coordination normal.      Deep Tendon Reflexes: Reflexes are normal and symmetric.      Comments: She has mild right-sided hemiparesis.   Psychiatric:         Mood and Affect: Mood normal.          Behavior: Behavior normal. Behavior is cooperative.         Thought Content: Thought content normal.         Judgment: Judgment normal.         Medication Review:    Current Facility-Administered Medications:   •  acetaminophen (TYLENOL) tablet 650 mg, 650 mg, Oral, Q4H PRN **OR** acetaminophen (TYLENOL) 160 MG/5ML solution 650 mg, 650 mg, Oral, Q4H PRN **OR** acetaminophen (TYLENOL) suppository 650 mg, 650 mg, Rectal, Q4H PRN, Christopher Alvarez MD  •  aspirin tablet 325 mg, 325 mg, Oral, Daily, Bandar De Guzman APRN, 325 mg at 03/08/21 0812  •  atorvastatin (LIPITOR) tablet 80 mg, 80 mg, Oral, Daily, Christopher Alvarez MD, 80 mg at 03/08/21 0812  •  clopidogrel (PLAVIX) tablet 75 mg, 75 mg, Oral, Daily, Bandar De Guzman APRN, 75 mg at 03/08/21 0812  •  enoxaparin (LOVENOX) syringe 40 mg, 40 mg, Subcutaneous, Q24H, Chirstopher Alvarez MD, 40 mg at 03/07/21 1611  •  [START ON 3/9/2021] influenza vac split quad (FLUZONE,FLUARIX,AFLURIA,FLULAVAL) injection 0.5 mL, 0.5 mL, Intramuscular, During Hospitalization, Christopher Alvarez MD  •  niCARdipine (CARDENE) 25 mg in 250 mL NS infusion, 1-10 mg/hr, Intravenous, Titrated, Jean Tierney MD, Last Rate: 30 mL/hr at 03/08/21 0907, 3 mg/hr at 03/08/21 0907  •  ondansetron (ZOFRAN) tablet 4 mg, 4 mg, Oral, Q6H PRN **OR** ondansetron (ZOFRAN) injection 4 mg, 4 mg, Intravenous, Q6H PRN, Christopher Alvarez MD  •  pantoprazole (PROTONIX) injection 40 mg, 40 mg, Intravenous, Q AM, Christopher Alvarez MD, 40 mg at 03/08/21 0553  •  sodium chloride 0.9 % flush 10 mL, 10 mL, Intravenous, PRN, Christopher Alvarez MD  •  sodium chloride 0.9 % flush 10 mL, 10 mL, Intravenous, Q12H, Christopher Alvarez MD, 10 mL at 03/08/21 0813  •  sodium chloride 0.9 % flush 10 mL, 10 mL, Intravenous, PRN, Christopher Alvarez MD  •  sodium chloride 0.9 % infusion, 75 mL/hr, Intravenous, Continuous, Jean Tierney MD, Last Rate: 75 mL/hr at 03/07/21 1957, 75 mL/hr at 03/07/21  1957    Results Review:  I have reviewed the labs, radiology results, and diagnostic studies.    Laboratory Data:   Results from last 7 days   Lab Units 03/08/21  0311 03/07/21  0932 03/03/21  1215   SODIUM mmol/L 141 140  --    POTASSIUM mmol/L 3.8 4.2  --    CHLORIDE mmol/L 110* 106  --    CO2 mmol/L 24.0 27.0  --    BUN mg/dL 20 30* 36*   CREATININE mg/dL 0.79 0.84 0.99   GLUCOSE mg/dL 77 75  --    CALCIUM mg/dL 11.2* 11.9*  --    BILIRUBIN mg/dL  --  0.5  --    ALK PHOS U/L  --  58  --    ALT (SGPT) U/L  --  20  --    AST (SGOT) U/L  --  14  --    ANION GAP mmol/L 7.0 7.0  --      Estimated Creatinine Clearance: 57.7 mL/min (by C-G formula based on SCr of 0.79 mg/dL).          Results from last 7 days   Lab Units 03/08/21 0311 03/07/21  0932   WBC 10*3/mm3 9.24 11.18*   HEMOGLOBIN g/dL 14.3 14.1   HEMATOCRIT % 43.0 41.4   PLATELETS 10*3/mm3 237 278           Culture Data:   No results found for: BLOODCX  No results found for: URINECX  No results found for: RESPCX  No results found for: WOUNDCX  No results found for: STOOLCX  No components found for: BODYFLD    Radiology Data:   Imaging Results (Last 24 Hours)     Procedure Component Value Units Date/Time    CT Angiogram Head [[id number]] Collected: 03/07/21 1520     Updated: 03/07/21 1644    Narrative:      PROCEDURE: CT HEAD ANGIOGRAPHY WITH IV CONTRAST, CT NECK  ANGIOGRAPHY WITH IV CONTRAST    CLINICAL HISTORY: L pontine stroke, I63.9 Cerebral infarction,  unspecified I16.1 Hypertensive emergency    INDICATION: Same as above    Comparison: MRI of the brain done on the same day    TECHNIQUE:     CT angiography of the head and neck neck was done with  intravenous contrast followed by 3-D rendering of the  intracranial and neck arterial vasculature.    The patient was injected with 90 mL of Isovue-370 intravenously,  without any documented immediate adverse reactions.    This exam was performed according to the departmental  dose-optimization program which  includes automated exposure  control, adjustment of the mA and/or kV according to patient size  and/or use of iterative reconstruction technique.      NASCET criteria was utilized in evaluation of arterial stenosis.    FINDINGS:     CTA OF THE HEAD:    There is no evidence of hemodynamically significant stenosis or a  focal aneurysm formation in the visualized intracranial portion  the bilateral internal carotid arteries, carotid siphon region,  bilateral anterior, middle and posterior cerebral arteries and  their branches.    The basilar tip is normal.     Antegrade flow is seen in the bilateral vertebral arteries.    Evaluation of the superior sagittal sinus, vein of Minh region,  torcula, straight sinus, bilateral internal cerebral veins,  bilateral sigmoid sinuses and the jugular foramina bilaterally do  not show any gross abnormalities.    CTA OF THE NECK:    There is no evidence of hemodynamically significant stenosis or a  focal aneurysm formation in the bilateral common carotid and the  bilateral internal carotid arteries.    Tortuosity of the bilateral internal carotid arteries in the  cervical region is noted    Antegrade flow is seen in the bilateral vertebral arteries.    The takeoff of the great vessels from the aortic arch region is  unremarkable.    The visualized cervical spine shows multilevel degenerative  change .    The prevertebral soft tissues unremarkable.    The laryngotracheal airway is widely patent. Note is made of  multiple bilateral thyroid nodules, the largest nodule seen in  the right side and measuring 2.4 cm and can be evaluated  electively with an ultrasound of the thyroid    The visualized lung apices are unremarkable .      Impression:        Unremarkable CT angiography of the brain.  Unremarkable CT angiography of the neck.   Note is made of multiple bilateral thyroid nodules, the largest  nodule seen in the right side and measuring 2.4 cm and can be  evaluated electively with  an ultrasound of the thyroid    Electronically signed by:  Christian Davis MD  3/7/2021 4:43 PM CST  Workstation: [id number]    CT Angiogram Carotids [[id number]] Collected: 03/07/21 1520     Updated: 03/07/21 1644    Narrative:      PROCEDURE: CT HEAD ANGIOGRAPHY WITH IV CONTRAST, CT NECK  ANGIOGRAPHY WITH IV CONTRAST    CLINICAL HISTORY: L pontine stroke, I63.9 Cerebral infarction,  unspecified I16.1 Hypertensive emergency    INDICATION: Same as above    Comparison: MRI of the brain done on the same day    TECHNIQUE:     CT angiography of the head and neck neck was done with  intravenous contrast followed by 3-D rendering of the  intracranial and neck arterial vasculature.    The patient was injected with 90 mL of Isovue-370 intravenously,  without any documented immediate adverse reactions.    This exam was performed according to the departmental  dose-optimization program which includes automated exposure  control, adjustment of the mA and/or kV according to patient size  and/or use of iterative reconstruction technique.      NASCET criteria was utilized in evaluation of arterial stenosis.    FINDINGS:     CTA OF THE HEAD:    There is no evidence of hemodynamically significant stenosis or a  focal aneurysm formation in the visualized intracranial portion  the bilateral internal carotid arteries, carotid siphon region,  bilateral anterior, middle and posterior cerebral arteries and  their branches.    The basilar tip is normal.     Antegrade flow is seen in the bilateral vertebral arteries.    Evaluation of the superior sagittal sinus, vein of Minh region,  torcula, straight sinus, bilateral internal cerebral veins,  bilateral sigmoid sinuses and the jugular foramina bilaterally do  not show any gross abnormalities.    CTA OF THE NECK:    There is no evidence of hemodynamically significant stenosis or a  focal aneurysm formation in the bilateral common carotid and the  bilateral internal carotid  arteries.    Tortuosity of the bilateral internal carotid arteries in the  cervical region is noted    Antegrade flow is seen in the bilateral vertebral arteries.    The takeoff of the great vessels from the aortic arch region is  unremarkable.    The visualized cervical spine shows multilevel degenerative  change .    The prevertebral soft tissues unremarkable.    The laryngotracheal airway is widely patent. Note is made of  multiple bilateral thyroid nodules, the largest nodule seen in  the right side and measuring 2.4 cm and can be evaluated  electively with an ultrasound of the thyroid    The visualized lung apices are unremarkable .      Impression:        Unremarkable CT angiography of the brain.  Unremarkable CT angiography of the neck.   Note is made of multiple bilateral thyroid nodules, the largest  nodule seen in the right side and measuring 2.4 cm and can be  evaluated electively with an ultrasound of the thyroid    Electronically signed by:  Christian aDvis MD  3/7/2021 4:43 PM CST  Workstation: [id number]          I have reviewed the patient's current medications.     Assessment/Plan     Hospital Problem List:  Active Problems:    Cerebrovascular accident (CVA) (CMS/HCC)    Left pontine stroke (complicated by right-sided hemiparesis and dysarthria): Continue high intensity statin, DAPT with PT, OT and speech therapy.  CT angiogram of head and neck was unremarkable and transthoracic echocardiogram is pending. COVID-19 PCR is negative.  Input by the neurologist is appreciated.     Hypertensive urgency: Blood pressure  is improved with Cardene infusion.  Discontinued Cardene infusion and transition to oral medication with adjustments for optimal blood pressure control.     Transient bradycardia: Heart rate has improved.  Continue to monitor.       Continue GI and DVT prophylaxis    Discharge Planning: In progress.    Christopher Alvarez MD   03/08/21   09:37 CST

## 2021-03-08 NOTE — PLAN OF CARE
Goal Outcome Evaluation:  Plan of Care Reviewed With: patient  Progress: no change  Outcome Summary: PT eval completed at bedside. Pt previously indep in home and community mobility & ADLs. Dtr local but not close by but can stay w/ her some of the time if d/c home per pt. Pt able to move w/out obligation to abnormal tone but w/ hx of muna club feet she has some unsteadiness noted in gait that could be premorbid as well as CVA/TIA  related. She moved all 4 E'x w/out abnormal tone. Sit<>Stand, gait w/ supervision/CGA 1 for stabilty as needed. She used FWRW but did not keep it close to feet as requested, pushing it at least 1 fot ahead of her putting her in full fwd flx. She was cued 3 times to keep walker closer than out at arms length.Her base of support is limited due to  hx club foot and feet very small which may affect gait.  She reports yesterday her R side was weaker and this is noted but not specifically weaker in MMT.  She can benefit from skilled services to prep for living alone at home as mobility can be fall risk for her.  Recommend home w/ 24/7 care and follow up for therapy or IRF to prepare her for living alone post CVA/TIA

## 2021-03-08 NOTE — SIGNIFICANT NOTE
03/08/21 1332   OTHER   Discipline occupational therapist   Rehab Time/Intention   Session Not Performed patient/family declined treatment   OT treatment attempted this afternoon. Pt requesting to sleep this afternoon as she is very fatigued. Will follow up tomorrow.

## 2021-03-08 NOTE — PLAN OF CARE
Goal Outcome Evaluation:  Plan of Care Reviewed With: patient  Progress: improving  Outcome Summary: cardene titirated off, vss, worked with pt/ot

## 2021-03-08 NOTE — THERAPY EVALUATION
Patient Name: Josi Roe  : 1945    MRN: [id number]                              Today's Date: 3/8/2021       Admit Date: 3/7/2021    Visit Dx:     ICD-10-CM ICD-9-CM   1. Cerebrovascular accident (CVA), unspecified mechanism (CMS/HCC)  I63.9 434.91   2. Hypertensive emergency  I16.1 401.9   3. Impaired mobility and ADLs  Z74.09 V49.89    Z78.9      Patient Active Problem List   Diagnosis   • Cerebrovascular accident (CVA) (CMS/HCC)     Past Medical History:   Diagnosis Date   • Heart murmur    • Hypertension      Past Surgical History:   Procedure Laterality Date   • CHOLECYSTECTOMY     • CLUB FOOT RELEASE     • HYSTERECTOMY       General Information     Row Name 21          OT Time and Intention    Document Type  evaluation  -ME     Mode of Treatment  individual therapy;occupational therapy  -ME     Row Name 21          General Information    Patient Profile Reviewed  yes  -ME     Prior Level of Function  independent:;all household mobility;community mobility;ADL's;shopping;driving  -ME     Existing Precautions/Restrictions  fall  -ME     Row Name 21          Living Environment    Lives With  alone  -ME     Row Name 21          Home Main Entrance    Number of Stairs, Main Entrance  none  -ME     Row Name 2159          Stairs Within Home, Primary    Stairs, Within Home, Primary  has a FWW but was not using yet; has a tub shower combo with no shower chair;  -ME     Number of Stairs, Within Home, Primary  none  -ME     Row Name 21          Cognition    Orientation Status (Cognition)  oriented x 4  -ME     Row Name 21          Safety Issues, Functional Mobility    Safety Issues Affecting Function (Mobility)  at risk behavior observed;awareness of need for assistance;insight into deficits/self-awareness;safety precautions follow-through/compliance;safety precaution awareness  -ME     Impairments Affecting Function (Mobility)   balance;endurance/activity tolerance;strength  -ME       User Key  (r) = Recorded By, (t) = Taken By, (c) = Cosigned By    Initials Name Provider Type    ME Latonia See OTR/L Occupational Therapist          Mobility/ADL's     Row Name 03/08/21 0959          Bed Mobility    Bed Mobility  supine-sit  -ME     Supine-Sit [place] (Bed Mobility)  supervision  -ME     Assistive Device (Bed Mobility)  head of bed elevated;bed rails  -ME     Comment (Bed Mobility)  increased time and verbal cues required  -ME     Row Name 03/08/21 0959          Transfers    Transfers  sit-stand transfer;bed-chair transfer  -ME     Comment (Transfers)  sit to stand x 2 at EOB; vc's for hand placement with transfers; recommend use of RW as pt was reaching for OT and for furniture  -ME     Bed-Chair [place] (Transfers)  minimum assist (75% patient effort)  -ME     Assistive Device (Bed-Chair Transfers)  other (see comments) hand held and none  -ME     Sit-Stand [place] (Transfers)  minimum assist (75% patient effort)  -ME     Row Name 03/08/21 0959          Sit-Stand Transfer    Assistive Device (Sit-Stand Transfers)  other (see comments) hand held and none  -ME     Row Name 03/08/21 0959          Functional Mobility    Functional Mobility- Ind. Level  minimum assist (75% patient effort)  -ME     Functional Mobility- Device  other (see comments) hand held and none  -ME     Functional Mobility- Comment  educated pt on need for RW use as pt was reaching for furniture and for OT with mobility; ambulated at bedside only; poor balance upon initial stand but seemed to improve with standing longer  -ME     Row Name 03/08/21 0959          Activities of Daily Living    BADL Assessment/Intervention  lower body dressing;upper body dressing  -ME     Row Name 03/08/21 0959          Lower Body Dressing Assessment/Training    [place] Level (Lower Body Dressing)  don;socks;dependent (less than 25% patient effort)  -ME     Position  (Lower Body Dressing)  sitting up in bed  -ME     Row Name 03/08/21 0959          Upper Body Dressing Assessment/Training    [place] Level (Upper Body Dressing)  doff;don;minimum assist (75% patient effort) hospital gown  -ME     Position (Upper Body Dressing)  edge of bed sitting  -ME       User Key  (r) = Recorded By, (t) = Taken By, (c) = Cosigned By    Initials Name Provider Type    ME Latonia See, OTR/L Occupational Therapist        Obj/Interventions     Row Name 03/08/21 0959          Sensory Assessment (Somatosensory)    Sensory Assessment (Somatosensory)  UE sensation intact  -ME     Row Name 03/08/21 0959          Range of Motion Comprehensive    General Range of Motion  no range of motion deficits identified;bilateral upper extremity ROM WFL  -ME     Row Name 03/08/21 0959          Strength Comprehensive (MMT)    General Manual Muscle Testing (MMT) Assessment  other (see comments)  -ME     Comment, General Manual Muscle Testing (MMT) Assessment  BUE strength and bilateral  strength are 4- to 4/5; equal on both sides; pt is right handed; no coordination deficits observed with nose finger nose coordination testing  -ME     Row Name 03/08/21 0959          Balance    Balance Assessment  sitting static balance;sitting dynamic balance;standing static balance;standing dynamic balance  -ME     Static Sitting Balance  WFL  -ME     Dynamic Sitting Balance  WFL  -ME     Static Standing Balance  mild impairment  -ME     Dynamic Standing Balance  mild impairment;moderate impairment  -ME       User Key  (r) = Recorded By, (t) = Taken By, (c) = Cosigned By    Initials Name Provider Type    ME Latonia See, OTR/L Occupational Therapist        Goals/Plan     Row Name 03/08/21 0959          Transfer Goal 1 (OT)    Activity/Assistive Device (Transfer Goal 1, OT)  toilet  -ME     [place] Level/Cues Needed (Transfer Goal 1, OT)  supervision required  -ME     Time Frame (Transfer Goal 1, OT)  long term goal  (LTG);by discharge  -ME     Progress/Outcome (Transfer Goal 1, OT)  goal not met  -ME     [facility] Name 03/08/21 0959          Bathing Goal 1 (OT)    Activity/Device (Bathing Goal 1, OT)  lower body bathing AD as needed  -ME     [place] Level/Cues Needed (Bathing Goal 1, OT)  supervision required  -ME     Time Frame (Bathing Goal 1, OT)  long term goal (LTG);by discharge  -ME     Progress/Outcomes (Bathing Goal 1, OT)  goal not met  -ME     [facility] Name 03/08/21 0959          Dressing Goal 1 (OT)    Activity/Device (Dressing Goal 1, OT)  lower body dressing AD as needed  -ME     Teller/Cues Needed (Dressing Goal 1, OT)  supervision required  -ME     Time Frame (Dressing Goal 1, OT)  long term goal (LTG);by discharge  -ME     Progress/Outcome (Dressing Goal 1, OT)  goal not met  -ME     Row Name 03/08/21 0959          Therapy Assessment/Plan (OT)    Planned Therapy Interventions (OT)  activity tolerance training;adaptive equipment training;BADL retraining;IADL retraining;functional balance retraining;occupation/activity based interventions;patient/caregiver education/training;ROM/therapeutic exercise;strengthening exercise;transfer/mobility retraining  -ME       User Key  (r) = Recorded By, (t) = Taken By, (c) = Cosigned By    Initials Name Provider Type    Latonia Barnes OTR/L Occupational Therapist        Clinical Impression     [facility] Name 03/08/21 0959 03/08/21 0848       Pain Assessment    Additional Documentation  Pain Scale: Numbers Pre/Post-Treatment (Group)  -ME  Pain Scale: Numbers Pre/Post-Treatment (Group)  -[facility] Name 03/08/21 0959          Pain Scale: Numbers Pre/Post-Treatment    Pretreatment Pain Rating  0/10 - no pain  -ME     Posttreatment Pain Rating  0/10 - no pain  -ME     Row Name 03/08/21 0959          Plan of Care Review    Plan of Care Reviewed With  patient  -[facility] Name 03/08/21 0959          Therapy Assessment/Plan (OT)    Patient/Family Therapy Goal Statement (OT)  to return  home  -ME     Rehab Potential (OT)  good, to achieve stated therapy goals  -ME     Criteria for Skilled Therapeutic Interventions Met (OT)  yes;meets criteria;skilled treatment is necessary  -ME     Therapy Frequency (OT)  daily  -ME     Predicted Duration of Therapy Intervention (OT)  until d/c or all goals met  -ME     Row Name 03/08/21 0959          Therapy Plan Review/Discharge Plan (OT)    Anticipated Discharge Disposition (OT)  home;home with 24/7 care  -ME     Row Name 03/08/21 0959          Vital Signs    Pre Systolic BP Rehab  154  -ME     Pre Treatment Diastolic BP  72  -ME     Post Systolic BP Rehab  173  -ME     Post Treatment Diastolic BP  77  -ME     Pretreatment Heart Rate (beats/min)  57  -ME     Posttreatment Heart Rate (beats/min)  60  -ME     Pre SpO2 (%)  98  -ME     O2 Delivery Pre Treatment  room air  -ME     Post SpO2 (%)  98  -ME     O2 Delivery Post Treatment  room air  -ME     Pre Patient Position  Supine  -ME     Post Patient Position  Sitting  -ME     Row Name 03/08/21 0959          Positioning and Restraints    Pre-Treatment Position  in bed  -ME     Post Treatment Position  chair  -ME     In Chair  notified nsg;sitting;call light within reach;encouraged to call for assist  -ME       User Key  (r) = Recorded By, (t) = Taken By, (c) = Cosigned By    Initials Name Provider Type    ME Latonia See OTR/L Occupational Therapist        Outcome Measures     Row Name 03/08/21 0959          How much help from another is currently needed...    Putting on and taking off regular lower body clothing?  2  -ME     Bathing (including washing, rinsing, and drying)  2  -ME     Toileting (which includes using toilet bed pan or urinal)  3  -ME     Putting on and taking off regular upper body clothing  3  -ME     Taking care of personal grooming (such as brushing teeth)  4  -ME     Eating meals  4  -ME     AM-PAC 6 Clicks Score (OT)  18  -ME     Row Name 03/08/21 0959          Modified [place] Scale     Pre-Stroke Modified [place] Scale  0 - No Symptoms at all.  -ME     Modified [place] Scale  4 - Moderately severe disability.  Unable to walk without assistance, and unable to attend to own bodily needs without assistance.  -ME     Row Name 03/08/21 0959          Functional Assessment    Outcome Measure Options  AM-PAC 6 Clicks Daily Activity (OT);Modified [place]  -ME       User Key  (r) = Recorded By, (t) = Taken By, (c) = Cosigned By    Initials Name Provider Type    Latonia Barnes OTR/L Occupational Therapist        Occupational Therapy Education                 Title: PT OT SLP Therapies (In Progress)     Topic: Occupational Therapy (In Progress)     Point: ADL training (Not Started)     Description:   Instruct learner(s) on proper safety adaptation and remediation techniques during self care or transfers.   Instruct in proper use of assistive devices.              Learner Progress:  Not documented in this visit.          Point: Home exercise program (Not Started)     Description:   Instruct learner(s) on appropriate technique for monitoring, assisting and/or progressing therapeutic exercises/activities.              Learner Progress:  Not documented in this visit.          Point: Precautions (Done)     Description:   Instruct learner(s) on prescribed precautions during self-care and functional transfers.              Learning Progress Summary           Patient Acceptance, E, VU by ME at 3/8/2021 1114    Comment: Educated on OT and POC. Educated to call for assistance. Educated on safety precautions. Educated on discharge recommendations.                   Point: Body mechanics (Done)     Description:   Instruct learner(s) on proper positioning and spine alignment during self-care, functional mobility activities and/or exercises.              Learning Progress Summary           Patient Acceptance, E, VU by ME at 3/8/2021 1114    Comment: Educated on OT and POC. Educated to call for assistance. Educated on  safety precautions. Educated on discharge recommendations.                               User Key     Initials Effective Dates Name Provider Type Discipline    ME 08/24/20 -  Latonia See, OTR/L Occupational Therapist OT              OT Recommendation and Plan  Planned Therapy Interventions (OT): activity tolerance training, adaptive equipment training, BADL retraining, IADL retraining, functional balance retraining, occupation/activity based interventions, patient/caregiver education/training, ROM/therapeutic exercise, strengthening exercise, transfer/mobility retraining  Therapy Frequency (OT): daily  Plan of Care Review  Plan of Care Reviewed With: patient  Outcome Summary: initial OT evaluation complete. pt was pleasant and cooperative throughout. oriented x 4. complete sup to sit bed mobility with supervision, increased time required for task completion. was dependent for donning socks. min A for donning/doffing hospital gown while sitting EOB. completed sit to stand transfers x 2 with min A/no RW. completed bed to chair transfer with min A. hand held assistance required. pt reaching for furniture with mobility and transfers. ambulated at bedside with min A. poor balance upon initial standing, but improved somewhat with increased time and time in standing. recommended use of RW to pt as pt balance is decreased at this time. BUE ROM WFL grossly. BUE strength and bilateral  strength are grossly 4/5. strength is equal on both sides. no coordination deficits identified. recommend further skilled OT services to address functional mobility and ADL deficits, as well as balance, strength, and endurance. recommend home with 24/7 care at discharge. spoke with pt about possibly having daugther stay with her once she gets home. goals established.     Time Calculation:   Time Calculation- OT     Row Name 03/08/21 1114             Time Calculation- OT    OT Start Time  0959  -ME      OT Stop Time  1053  -ME      OT  Time Calculation (min)  54 min  -ME      OT Received On  03/08/21  -ME      OT Goal Re-Cert Due Date  03/21/21  -ME        User Key  (r) = Recorded By, (t) = Taken By, (c) = Cosigned By    Initials Name Provider Type    Latonia Barnes OTR/L Occupational Therapist        Therapy Charges for Today     Code Description Service Date Service Provider Modifiers Qty    63986228852 HC OT EVAL MOD COMPLEXITY 4 3/8/2021 Latonia See OTR/L GO 1               Latonia See OTR/CHIQUIS  3/8/2021

## 2021-03-08 NOTE — THERAPY DISCHARGE NOTE
Inpatient Rehabilitation - Speech Language Pathology   Swallow Initial Evaluation/Discharge [facility]     Patient Name: Josi Roe  : 1945  MRN: [id number]  Today's Date: 3/8/2021               Admit Date: 3/7/2021     ST evaluation completed this date. Pt passed the nursing swallow assessment and was started on a regular diet and thin liquids. Pt was finishing am meal upon SLP arrival. Pt completed meal with no concerns. No s/s of aspiration noted. Pt stated that she had no diet restrictions from home. Pt lives alone. Slurred speech was noted upon pt arrival in ED but that has since resolved. Pt was administered the SLUMs with a score of 28/30. No concerns noted for cognition, speech, or language. This is an evaluation only. Continue current diet of regular textures and thin liquids. Pt in agreement. RN aware.    Ly Maynard MS CCC-SLP    Visit Dx:    ICD-10-CM ICD-9-CM   1. Cerebrovascular accident (CVA), unspecified mechanism (CMS/HCC)  I63.9 434.91   2. Hypertensive emergency  I16.1 401.9     Patient Active Problem List   Diagnosis   • Cerebrovascular accident (CVA) (CMS/HCC)     Past Medical History:   Diagnosis Date   • Heart murmur    • Hypertension      Past Surgical History:   Procedure Laterality Date   • CHOLECYSTECTOMY     • CLUB FOOT RELEASE     • HYSTERECTOMY            SWALLOW EVALUATION (last 72 hours)      SLP Adult Swallow Evaluation     Row Name 21 0852                   Rehab Evaluation    Document Type  evaluation  -EA        Subjective Information  no complaints  -EA        Patient Observations  alert;cooperative;agree to therapy  -EA        Patient/Family/Caregiver Comments/Observations  none   -EA        Care Plan Review  care plan/treatment goals reviewed  -EA        Patient Effort  good  -EA        Comment  Pt passed nursing swallow assessment and was upgraded to regular diet. Pt was completed am meal upon SLP entering.   -EA           General Information     Patient Profile Reviewed  yes  -EA        Pertinent History Of Current Problem  Hx of hypertension; no hx of dysphagia   -EA        Current Method of Nutrition  regular textures;thin liquids  -EA        Precautions/Limitations, Vision  WFL;for purposes of eval  -EA        Precautions/Limitations, Hearing  hearing impairment, right;WFL;for purposes of eval  -EA        Prior Level of Function-Communication  WFL  -EA        Prior Level of Function-Swallowing  no diet consistency restrictions  -EA        Plans/Goals Discussed with  patient  -EA        Barriers to Rehab  none identified  -EA        Patient's Goals for Discharge  return home  -EA           Pain    Additional Documentation  Pain Scale: FACES Pre/Post-Treatment (Group)  -EA           Pain Scale: FACES Pre/Post-Treatment    Pain: FACES Scale, Pretreatment  0-->no hurt  -EA        Posttreatment Pain Rating  0-->no hurt  -EA           Oral Motor Structure and Function    Dentition Assessment  upper dentures/partial in place;natural, present and adequate right partial   -EA        Secretion Management  WNL/WFL  -EA        Mucosal Quality  moist, healthy  -EA        Volitional Swallow  WFL  -EA        Volitional Cough  WFL  -EA           General Eating/Swallowing Observations    Respiratory Support Currently in Use  room air  -EA        Eating/Swallowing Skills  self-fed  -EA        Positioning During Eating  upright 90 degree;upright in bed  -EA        Utensils Used  spoon;cup;straw  -EA        Consistencies Trialed  regular textures;soft textures;thin liquids  -EA           Clinical Swallow Eval    Oral Prep Phase  WFL  -EA        Oral Transit  WFL  -EA        Oral Residue  WFL  -EA        Pharyngeal Phase  WFL  -EA        Esophageal Phase  unremarkable  -EA           Clinical Impression    SLP Swallowing Diagnosis  swallow WFL  -EA        Functional Impact  no impact on function  -EA        Swallow Criteria for Skilled Therapeutic Interventions Met  not  appropriate;no problems identified which require skilled intervention  -EA           Recommendations    Therapy Frequency (Swallow)  evaluation only  -EA        SLP Diet Recommendation  regular textures;thin liquids  -EA        SLP Rec. for Method of Medication Administration  as tolerated  -EA        Anticipated Discharge Disposition (SLP)  home;unknown  -EA          User Key  (r) = Recorded By, (t) = Taken By, (c) = Cosigned By    Initials Name Effective Dates    KATHERINE Ly Maynard, MS CCC-SLP 08/09/20 -           EDUCATION  The patient has been educated in the following areas:   Cognitive Impairment Dysphagia (Swallowing Impairment).    SLP Recommendation and Plan  SLP Swallowing Diagnosis: swallow WFL  SLP Diet Recommendation: regular textures, thin liquids           Swallow Criteria for Skilled Therapeutic Interventions Met: not appropriate, no problems identified which require skilled intervention  Anticipated Discharge Disposition (SLP): home, unknown     Therapy Frequency (Swallow): evaluation only              Anticipated Discharge Disposition (SLP): home, unknown             Plan of Care Reviewed With: patient  Progress: improving  Outcome Summary: ST evaluation completed this date. Pt passed the nursing swallow assessment and was started on a regular diet and thin liquids. Pt was finishing am meal upon SLP arrival. Pt completed meal with no concerns. No s/s of aspiration noted. Pt stated that she had no diet restrictions from home. Pt lives alone. Slurred speech was noted upon pt arrival in ED but that has since resolved. Pt was administered the SLUMs with a score of 28/30. No concerns noted for cognition, speech, or language. This is an evaluation only. Continue current diet of regular textures and thin liquids. Pt in agreement. RN aware.             Time Calculation:   Time Calculation- SLP     Row Name 03/08/21 0942             Time Calculation- SLP    SLP Start Time  0852  -EA      SLP Stop Time   0942  -KATHERINE      SLP Time Calculation (min)  50 min  -EA      Total Timed Code Minutes- SLP  50 minute(s)  -EA      SLP Received On  03/08/21  -KATHERINE      SLP Goal Re-Cert Due Date  03/22/21  -KATHERINE        User Key  (r) = Recorded By, (t) = Taken By, (c) = Cosigned By    Initials Name Provider Type    Ly Mitchell MS CCC-SLP Speech and Language Pathologist          Therapy Charges for Today     Code Description Service Date Service Provider Modifiers Qty    73259071342 HC ST EVAL SPEECH AND PROD W LANG  2 3/8/2021 Ly Maynard MS CCC-SLP GN 1    74255548508 HC ST EVAL ORAL PHARYNG SWALLOW 1 3/8/2021 Ly Maynard MS CCC-SLP GN 1               SLP Discharge Summary  Anticipated Discharge Disposition (SLP): home, unknown    Ly Maynard MS CCC-JIMENA  3/8/2021

## 2021-03-08 NOTE — NURSING NOTE
Discussed with Dr. Alvarez pt blood pressure and cardene settings. Home meds have nto been restarted at this time.   Pt DC'd. IV removed, discharge instructions provided to patient with assistance from in house  [place], pt verbalizes understanding. Pt states all questions have been answered. Copy of discharge paperwork provided to pt, signed copy in chart. Pt states all belongings in possession. Pt escorted off unit by  without incident.

## 2021-03-09 LAB
QT INTERVAL: 480 MS
QTC INTERVAL: 405 MS
T3FREE SERPL-MCNC: 2.55 PG/ML (ref 2–4.4)
T4 FREE SERPL-MCNC: 2.04 NG/DL (ref 0.93–1.7)
TSH SERPL DL<=0.05 MIU/L-ACNC: 3.37 UIU/ML (ref 0.27–4.2)

## 2021-03-09 PROCEDURE — 97530 THERAPEUTIC ACTIVITIES: CPT

## 2021-03-09 PROCEDURE — 97110 THERAPEUTIC EXERCISES: CPT

## 2021-03-09 PROCEDURE — 99233 SBSQ HOSP IP/OBS HIGH 50: CPT | Performed by: NURSE PRACTITIONER

## 2021-03-09 PROCEDURE — 97535 SELF CARE MNGMENT TRAINING: CPT

## 2021-03-09 PROCEDURE — 25010000002 ENOXAPARIN PER 10 MG: Performed by: INTERNAL MEDICINE

## 2021-03-09 PROCEDURE — 93005 ELECTROCARDIOGRAM TRACING: CPT | Performed by: INTERNAL MEDICINE

## 2021-03-09 PROCEDURE — 25010000002 HYDRALAZINE PER 20 MG: Performed by: INTERNAL MEDICINE

## 2021-03-09 PROCEDURE — 99223 1ST HOSP IP/OBS HIGH 75: CPT | Performed by: INTERNAL MEDICINE

## 2021-03-09 PROCEDURE — 93010 ELECTROCARDIOGRAM REPORT: CPT | Performed by: INTERNAL MEDICINE

## 2021-03-09 RX ORDER — HYDRALAZINE HYDROCHLORIDE 20 MG/ML
10 INJECTION INTRAMUSCULAR; INTRAVENOUS ONCE
Status: COMPLETED | OUTPATIENT
Start: 2021-03-09 | End: 2021-03-09

## 2021-03-09 RX ORDER — HYDRALAZINE HYDROCHLORIDE 25 MG/1
25 TABLET, FILM COATED ORAL EVERY 8 HOURS SCHEDULED
Status: DISCONTINUED | OUTPATIENT
Start: 2021-03-09 | End: 2021-03-10

## 2021-03-09 RX ORDER — PANTOPRAZOLE SODIUM 40 MG/1
40 TABLET, DELAYED RELEASE ORAL
Status: DISCONTINUED | OUTPATIENT
Start: 2021-03-10 | End: 2021-03-12 | Stop reason: HOSPADM

## 2021-03-09 RX ADMIN — ATORVASTATIN CALCIUM 80 MG: 40 TABLET, FILM COATED ORAL at 09:45

## 2021-03-09 RX ADMIN — LOSARTAN POTASSIUM 50 MG: 50 TABLET, FILM COATED ORAL at 09:45

## 2021-03-09 RX ADMIN — ENOXAPARIN SODIUM 40 MG: 40 INJECTION SUBCUTANEOUS at 16:25

## 2021-03-09 RX ADMIN — HYDRALAZINE HYDROCHLORIDE 25 MG: 25 TABLET, FILM COATED ORAL at 15:21

## 2021-03-09 RX ADMIN — ENALAPRILAT 1.25 MG: 1.25 INJECTION INTRAVENOUS at 16:31

## 2021-03-09 RX ADMIN — SODIUM CHLORIDE, PRESERVATIVE FREE 10 ML: 5 INJECTION INTRAVENOUS at 21:20

## 2021-03-09 RX ADMIN — HYDRALAZINE HYDROCHLORIDE 25 MG: 25 TABLET, FILM COATED ORAL at 21:20

## 2021-03-09 RX ADMIN — SODIUM CHLORIDE, PRESERVATIVE FREE 10 ML: 5 INJECTION INTRAVENOUS at 09:45

## 2021-03-09 RX ADMIN — PANTOPRAZOLE SODIUM 40 MG: 40 INJECTION, POWDER, LYOPHILIZED, FOR SOLUTION INTRAVENOUS at 05:28

## 2021-03-09 RX ADMIN — HYDRALAZINE HYDROCHLORIDE 10 MG: 20 INJECTION INTRAMUSCULAR; INTRAVENOUS at 19:56

## 2021-03-09 RX ADMIN — SODIUM CHLORIDE, PRESERVATIVE FREE 10 ML: 5 INJECTION INTRAVENOUS at 16:31

## 2021-03-09 RX ADMIN — SODIUM CHLORIDE 75 ML/HR: 900 INJECTION, SOLUTION INTRAVENOUS at 05:29

## 2021-03-09 RX ADMIN — CLOPIDOGREL BISULFATE 75 MG: 75 TABLET ORAL at 09:45

## 2021-03-09 RX ADMIN — ASPIRIN 325 MG: 325 TABLET ORAL at 09:45

## 2021-03-09 NOTE — THERAPY TREATMENT NOTE
Patient Name: Josi Roe  : 1945    MRN: [id number]                              Today's Date: 3/9/2021       Admit Date: 3/7/2021    Visit Dx:     ICD-10-CM ICD-9-CM   1. Cerebrovascular accident (CVA) due to thrombosis of cerebral artery (CMS/HCC)  I63.30 434.01   2. Cerebrovascular accident (CVA), unspecified mechanism (CMS/HCC)  I63.9 434.91   3. Hypertensive emergency  I16.1 401.9   4. Impaired mobility and ADLs  Z74.09 V49.89    Z78.9    5. Impaired functional mobility, balance, gait, and endurance  Z74.09 V49.89   6. Other hereditary cerebrovascular disease   I67.858 437.8     Patient Active Problem List   Diagnosis   • Cerebrovascular accident (CVA) (CMS/HCC)     Past Medical History:   Diagnosis Date   • Heart murmur    • Hypertension      Past Surgical History:   Procedure Laterality Date   • CHOLECYSTECTOMY     • CLUB FOOT RELEASE     • HYSTERECTOMY       General Information     Row Name 21 1351 21 0855       OT Time and Intention    Document Type  therapy note (daily note)  -AS  therapy note (daily note)  -AS    Mode of Treatment  individual therapy;occupational therapy  -AS  individual therapy;occupational therapy  -AS    Row Name 21 0855          General Information    Patient Profile Reviewed  yes  -AS     Row Name 21 1351 21 0855       Safety Issues, Functional Mobility    Safety Issues Affecting Function (Mobility)  insight into deficits/self-awareness;safety precautions follow-through/compliance;sequencing abilities;problem-solving;safety precaution awareness;judgment;positioning of assistive device  -AS  awareness of need for assistance;judgment;problem-solving;safety precautions follow-through/compliance;safety precaution awareness;sequencing abilities  -AS    Impairments Affecting Function (Mobility)  endurance/activity tolerance;balance;coordination  -AS  endurance/activity tolerance;balance;coordination  -AS    Comment, Safety Issues/Impairments  (Mobility)  --  lacks initiation  -AS      User Key  (r) = Recorded By, (t) = Taken By, (c) = Cosigned By    Initials Name Provider Type    AS Lucy Fernandes OT Occupational Therapist          Mobility/ADL's     Row Name 03/09/21 1351 03/09/21 0855       Bed Mobility    Bed Mobility  sit-supine;scooting/bridging  -AS  --    Scooting/Bridging Roland (Bed Mobility)  minimum assist (75% patient effort);verbal cues  -AS  --    Sit-Supine Roland (Bed Mobility)  moderate assist (50% patient effort)  -AS  --    Bed Mobility, Safety Issues  cognitive deficits limit understanding  -AS  --    Assistive Device (Bed Mobility)  head of bed elevated;bed rails  -AS  --    Comment (Bed Mobility)  --  Pt rec'd seated in chair  -AS    Row Name 03/09/21 1351 03/09/21 0855       Transfers    Transfers  sit-stand transfer  -AS  sit-stand transfer  -AS    Comment (Transfers)  --  sit<>stand x2; 1 LOB with retropulsion back to chair  -AS    Bed-Chair Roland (Transfers)  contact guard  -AS  --    Assistive Device (Bed-Chair Transfers)  walker, front-wheeled  -AS  --    Sit-Stand Roland (Transfers)  contact guard;verbal cues vc for hand placement  -AS  standby assist  -AS    Row Name 03/09/21 1351 03/09/21 0855       Sit-Stand Transfer    Assistive Device (Sit-Stand Transfers)  walker, front-wheeled  -AS  walker, front-wheeled  -AS    Row Name 03/09/21 0855          Activities of Daily Living    BADL Assessment/Intervention  bathing;lower body dressing;upper body dressing;grooming;toileting  -AS     Row Name 03/09/21 0855          Lower Body Dressing Assessment/Training    [place] Level (Lower Body Dressing)  don;pants/bottoms;minimum assist (75% patient effort)  -AS     Position (Lower Body Dressing)  unsupported sitting;supported standing  -AS     Row Name 03/09/21 0855          Upper Body Dressing Assessment/Training    [place] Level (Upper Body Dressing)  don;other (see comments);minimum assist (75%  "patient effort) hospital gown  -AS     Position (Upper Body Dressing)  supported sitting  -AS     Row Name 03/09/21 0855          Bathing Assessment/Intervention    [place] Level (Bathing)  upper body;verbal cues;set up;lower body;minimum assist (75% patient effort)  -AS     Position (Bathing)  unsupported sitting;supported standing  -AS     Comment (Bathing)  vc for initation and sequencing; min A for thorough wasing of ángel area and buttocks  -AS     Row Name 03/09/21 0855          Grooming Assessment/Training    [place] Level (Grooming)  wash face, hands;hair care, combing/brushing;oral care regimen  -AS     Position (Grooming)  unsupported sitting  -AS     Row Name 03/09/21 0855          Toileting Assessment/Training    Comment (Toileting)  pt found seated in chair with pure whick; pt linens and chair soiled. OT asked pt if she knew chair was soiled, pt stated \"yes\". OT asked why pt did ot contact nursing and pt stated \" I don't know\"  -AS       User Key  (r) = Recorded By, (t) = Taken By, (c) = Cosigned By    Initials Name Provider Type    AS Lucy Fernandes OT Occupational Therapist        Obj/Interventions     Row Name 03/09/21 1351          Range of Motion Comprehensive    Comment, General Range of Motion  completed 9 Hole Peg test; decreased coordination and in hand manipulation in R hand  -AS       User Key  (r) = Recorded By, (t) = Taken By, (c) = Cosigned By    Initials Name Provider Type    AS Lucy Fernandes OT Occupational Therapist        Goals/Plan     Row Name 03/09/21 1351 03/09/21 0850       Transfer Goal 1 (OT)    Activity/Assistive Device (Transfer Goal 1, OT)  toilet  -AS  toilet  -AS    [place] Level/Cues Needed (Transfer Goal 1, OT)  supervision required  -AS  supervision required  -AS    Time Frame (Transfer Goal 1, OT)  long term goal (LTG);by discharge  -AS  long term goal (LTG);by discharge  -AS    Progress/Outcome (Transfer Goal 1, OT)  goal not met  -AS  goal not met "  -AS    Row Name 03/09/21 [facility] 03/09/21 0850       Bathing Goal 1 (OT)    Activity/Device (Bathing Goal 1, OT)  lower body bathing AD as needed  -AS  lower body bathing AD as needed  -AS    [place] Level/Cues Needed (Bathing Goal 1, OT)  supervision required  -AS  supervision required  -AS    Time Frame (Bathing Goal 1, OT)  long term goal (LTG);by discharge  -AS  long term goal (LTG);by discharge  -AS    Progress/Outcomes (Bathing Goal 1, OT)  goal not met  -AS  goal not met  -AS    Row Name 03/09/21 [facility] 03/09/21 0850       Dressing Goal 1 (OT)    Activity/Device (Dressing Goal 1, OT)  lower body dressing AD as needed  -AS  lower body dressing AD as needed  -AS    [place]/Cues Needed (Dressing Goal 1, OT)  supervision required  -AS  supervision required  -AS    Time Frame (Dressing Goal 1, OT)  long term goal (LTG);by discharge  -AS  long term goal (LTG);by discharge  -AS    Progress/Outcome (Dressing Goal 1, OT)  goal not met  -AS  goal not met  -AS      User Key  (r) = Recorded By, (t) = Taken By, (c) = Cosigned By    Initials Name Provider Type    AS Lucy Fernandes, OT Occupational Therapist        Clinical Impression     Row Name 03/09/21 [facility] 03/09/21 0850       Pain Assessment    Additional Documentation  Pain Scale: Numbers Pre/Post-Treatment (Group)  -AS  Pain Scale: Numbers Pre/Post-Treatment (Group)  -AS    Row Name 03/09/21 [facility] 03/09/21 0850       Pain Scale: Numbers Pre/Post-Treatment    Pretreatment Pain Rating  0/10 - no pain  -AS  0/10 - no pain  -AS    Posttreatment Pain Rating  0/10 - no pain  -AS  0/10 - no pain  -AS    Row Name 03/09/21 0850          Plan of Care Review    Plan of Care Reviewed With  patient  -AS     Row Name 03/09/21 [facility] 03/09/21 0850       Vital Signs    Pre Systolic BP Rehab  162  -AS  174  -AS    Pre Treatment Diastolic BP  73  -AS  77  -AS    Post Systolic BP Rehab  194  -AS  149  -AS    Post Treatment Diastolic BP  83  -AS  67  -AS    Pretreatment Heart Rate  (beats/min)  65  -AS  45  -AS    Posttreatment Heart Rate (beats/min)  45  -AS  50  -AS    Pre SpO2 (%)  93  -AS  100  -AS    O2 Delivery Pre Treatment  --  room air  -AS    Post SpO2 (%)  92  -AS  95  -AS    O2 Delivery Post Treatment  --  room air  -AS    Pre Patient Position  Sitting  -AS  Sitting  -AS    Post Patient Position  Supine  -AS  Sitting  -AS    Row Name 03/09/21 1351 03/09/21 0850       Positioning and Restraints    Pre-Treatment Position  sitting in chair/recliner  -AS  sitting in chair/recliner  -AS    Post Treatment Position  bed  -AS  chair  -AS    In Bed  supine;notified nsg;call light within reach;encouraged to call for assist  -AS  --    In Chair  --  notified nsg;reclined;call light within reach;encouraged to call for assist  -AS      User Key  (r) = Recorded By, (t) = Taken By, (c) = Cosigned By    Initials Name Provider Type    AS Lucy Fernandes OT Occupational Therapist        Outcome Measures     Row Name 03/09/21 0850          How much help from another is currently needed...    Putting on and taking off regular lower body clothing?  3  -AS     Bathing (including washing, rinsing, and drying)  3  -AS     Toileting (which includes using toilet bed pan or urinal)  2  -AS     Putting on and taking off regular upper body clothing  3  -AS     Taking care of personal grooming (such as brushing teeth)  3  -AS     Eating meals  4  -AS     AM-PAC 6 Clicks Score (OT)  18  -AS     Row Name 03/09/21 1351          9 Hole Peg    9-Hole Peg Left  41 sec  -AS     9-Hole Peg Right  1 min 11 sec  -AS     Row Name 03/09/21 1351 03/09/21 0850       Functional Assessment    Outcome Measure Options  9 Hole Peg  -AS  AM-PAC 6 Clicks Daily Activity (OT)  -AS      User Key  (r) = Recorded By, (t) = Taken By, (c) = Cosigned By    Initials Name Provider Type    AS Lucy Fernandes OT Occupational Therapist        Occupational Therapy Education                 Title: PT OT SLP Therapies (In Progress)     Topic:  Occupational Therapy (In Progress)     Point: ADL training (Done)     Description:   Instruct learner(s) on proper safety adaptation and remediation techniques during self care or transfers.   Instruct in proper use of assistive devices.              Learning Progress Summary           Patient Acceptance, E, VU by AS at 3/9/2021 0933    Comment: ADL training, transfer training, safety edu                   Point: Home exercise program (Not Started)     Description:   Instruct learner(s) on appropriate technique for monitoring, assisting and/or progressing therapeutic exercises/activities.              Learner Progress:  Not documented in this visit.          Point: Precautions (Done)     Description:   Instruct learner(s) on prescribed precautions during self-care and functional transfers.              Learning Progress Summary           Patient Acceptance, E, VU by AS at 3/9/2021 0933    Comment: ADL training, transfer training, safety edu    Acceptance, E, VU by ME at 3/8/2021 1114    Comment: Educated on OT and POC. Educated to call for assistance. Educated on safety precautions. Educated on discharge recommendations.                   Point: Body mechanics (Done)     Description:   Instruct learner(s) on proper positioning and spine alignment during self-care, functional mobility activities and/or exercises.              Learning Progress Summary           Patient Acceptance, E, VU by ME at 3/8/2021 1114    Comment: Educated on OT and POC. Educated to call for assistance. Educated on safety precautions. Educated on discharge recommendations.                               User Key     Initials Effective Dates Name Provider Type Discipline    AS 07/05/20 -  Lucy Fernandes OT Occupational Therapist OT    ME 08/24/20 -  Latonia See OTR/L Occupational Therapist OT              OT Recommendation and Plan     Plan of Care Review  Plan of Care Reviewed With: patient  Outcome Summary: OT p.m. tx completed. Pt  instructed in 9 Hole Peg test for assessment of FMC. Pt completed with L hand in 41 second and completed with R hand in 1 min 11 sec. Pt demo decreased R hand FMC and in hand manipulation skills. Pt performed sit<>stand with CGA using RW (vc for hand placement) and completed SPT chair>EOB with min/CGA. Pt performed sit>supine with mod A. Pt left with all needs met and call bell in reach. Cont OT POC to progress towards goals.     Time Calculation:   Time Calculation- OT     Row Name 03/09/21 1421 03/09/21 0943          Time Calculation- OT    OT Start Time  1351  -AS  0850  -AS     OT Stop Time  1417  -AS  0943  -AS     OT Time Calculation (min)  26 min  -AS  53 min  -AS     OT Received On  03/09/21  -AS  03/09/21  -AS       User Key  (r) = Recorded By, (t) = Taken By, (c) = Cosigned By    Initials Name Provider Type    AS Lucy Fernandes OT Occupational Therapist        Therapy Charges for Today     Code Description Service Date Service Provider Modifiers Qty    [id number]  OT SELF CARE/MGMT/TRAIN EA 15 MIN 3/9/2021 Lucy Fernandes OT GO 4    [id number]  OT THERAPEUTIC ACT EA 15 MIN 3/9/2021 Lucy Fernandes OT GO 2               Lucy Fernandes OT  3/9/2021

## 2021-03-09 NOTE — PLAN OF CARE
"Goal Outcome Evaluation:  Plan of Care Reviewed With: patient     Outcome Summary: OT tx completed. Pt agreeable and cooperative, flat affect. Pt performed ADL tasks while up seated in chair. Pt performed UBB with s/u, LBB with min A (requires assist thoroughly washing ángel areas), LBD with min A, and grooming tasks with s/u. Pt performed sit<>stand from chair x2 with SBA. Pt had 1 LOB back to chair.  Pt demo overall decreased initation and requires vc for sequencing and problem solving. Pt found to be in soiled chair. When asked if she knew chair/pads where soiled, pt stated \"yes.\" OT asked why pt did not notify nurse and pt stated \" I don't know.\" No goals met on this date, cont OT POC. If pt returns home, will need 24/7 assist.  "

## 2021-03-09 NOTE — PLAN OF CARE
Goal Outcome Evaluation:  Plan of Care Reviewed With: patient     Outcome Summary: Pt continues with asymptomatic bradycardia. Cardiology consulted today-see note. BP continues elevated intermittently, other VSS. No c/o pain or discomfort. Remains edematous which patient states that she is always like this. Plan for Loop recorder placement on Wednesday per Neuro. NPO after midnight.

## 2021-03-09 NOTE — DISCHARGE PLACEMENT REQUEST
"Josi Roe (75 y.o. Female)     Date of Birth Social Security Number Address Home Phone MRN    1945  [address]  [facility] [address] [phone] [id number]    [employer]aker Marital Status          Gnosticism        Admission Date Admission Type Admitting Provider Attending Provider Department, Room/Bed    3/7/21 Emergency Christopher Alvarez MD Echendu, Anthony W, MD [facility] CRITICAL CARE STEPDOWN, 04/A    Discharge Date Discharge Disposition Discharge Destination                       Attending Provider: Christopher Alvarez MD    Allergies: Novocain [Procaine]    Isolation: None   Infection: None   Code Status: CPR    Ht: 154.9 cm (61\")   Wt: 75.6 kg (166 lb 10.7 oz)    Admission Cmt: None   Principal Problem: None                Active Insurance as of 3/7/2021     Primary Coverage     Payor Plan Insurance Group Employer/Plan Group    HUMANA MEDICARE REPLACEMENT HUMANA MEDICARE REPLACEMENT [id number]     Payor Plan Address Payor Plan Phone Number Payor Plan Fax Number Effective Dates    [address] [address] [phone]  8/1/2020 - None Entered    [facility] [id number]       Subscriber Name Subscriber Birth Date Member ID       JOSI ROE 1945 [id number]                 Emergency Contacts      (Rel.) Home Phone Work Phone Mobile Phone    SEAN HOLBROOK (Daughter) [phone] -- --            Vital Signs (last day)     Date/Time   Temp   Temp src   Pulse   Resp   BP   Patient Position   SpO2    03/09/21 0900   --   --   (!) 48   --   --   --   --    03/09/21 0800   98.6 (37)   Oral   (!) 43   18   (!) 167/111   Lying   99    03/09/21 0625   --   --   (!) 42   --   --   --   96    03/09/21 0537   --   --   --   18   --   --   --    03/09/21 0536   --   --   (!) 47   --   --   --   97    03/09/21 0402   --   --   --   --   153/67   --   --    03/09/21 0237   --   --   (!) 44   --   --   --   100    03/09/21 0002   --   --   --   --   " 155/69   --   --    03/08/21 2130   --   --   51   --   --   --   96    03/08/21 2101   --   --   --   --   148/65   --   --    03/08/21 2100   --   --   55   --   --   --   95    03/08/21 2045   98.5 (36.9)   Oral   52   18   --   Lying   96    03/08/21 2032   --   --   --   --   146/70   --   --    03/08/21 1900   --   --   56   --   177/86   --   98    03/08/21 1832   --   --   57   --   168/73   --   98    03/08/21 1700   --   --   53   --   158/72   --   98    03/08/21 1600   96.7 (35.9)   Temporal   53   18   159/74   Lying   99    03/08/21 1500   --   --   52   --   144/68   --   98    03/08/21 1400   --   --   56   --   141/61   --   97    03/08/21 1300   --   --   63   --   157/68   --   98    03/08/21 1200   98.1 (36.7)   Temporal   59   18   163/78   Lying   99    03/08/21 1100   --   --   62   --   150/72   --   98    03/08/21 1000   --   --   53   --   154/72   --   97    03/08/21 0930   --   --   56   --   167/81   --   99    03/08/21 0900   --   --   72   --   163/83   --   98    03/08/21 0830   --   --   53   --   170/77   --   96    03/08/21 0800   97.1 (36.2)   Temporal   (!) 49   18   160/74   Lying   99    03/08/21 0700   --   --   (!) 48   --   167/76   --   99    03/08/21 0632   --   --   57   --   177/81   --   99    03/08/21 0630   --   --   51   --   --   --   100    03/08/21 0601   --   --   --   --   159/75   --   --    03/08/21 0549   --   --   50   --   --   --   98    03/08/21 0531   --   --   --   --   158/75   --   --    03/08/21 0530   --   --   52   --   --   --   99    03/08/21 0501   --   --   --   --   163/79   --   --    03/08/21 0444   --   --   --   18   --   --   --    03/08/21 0443   --   --   76   --   --   --   100    03/08/21 0431   --   --   --   --   151/72   --   --    03/08/21 0430   --   --   54   --   --   --   98    03/08/21 0401   --   --   --   --   160/77   --   --    03/08/21 0344   --   --   55   --   --   --   100    03/08/21 0331   --   --   --   --   165/77    --   --    03/08/21 0319   --   --   55   --   --   --   100    03/08/21 0302   --   --   --   --   (!) 193/84   --   --    03/08/21 0300   --   --   (!) 48   --   --   --   100    03/08/21 0232   --   --   --   --   (!) 200/86   --   --    03/08/21 0230   --   --   57   --   --   --   100    03/08/21 0202   --   --   --   --   (!) 202/85   --   --    03/08/21 0200   --   --   (!) 47   --   --   --   100    03/08/21 0154   --   --   --   --   (!) 224/100   --   --    03/08/21 0130   --   --   51   --   --   --   100    03/08/21 0101   --   --   --   --   (!) 190/92   --   --    03/08/21 0100   --   --   (!) 48   18   --   --   100    03/08/21 0031   --   --   --   --   (!) 183/79   --   --    03/08/21 0030   --   --   (!) 47   16   --   --   97    03/08/21 0001   --   --   --   --   165/74   --   --              Lines, Drains & Airways    Active LDAs     Name:   Placement date:   Placement time:   Site:   Days:    Peripheral IV 03/07/21 0933 Right Antecubital   03/07/21 0933    Antecubital   2    External Urinary Catheter   03/08/21    0800    --   1                Current Facility-Administered Medications   Medication Dose Route Frequency Provider Last Rate Last Admin   • acetaminophen (TYLENOL) tablet 650 mg  650 mg Oral Q4H PRN Christopher Alvarez MD        Or   • acetaminophen (TYLENOL) 160 MG/5ML solution 650 mg  650 mg Oral Q4H PRN Christopher Alvarez MD        Or   • acetaminophen (TYLENOL) suppository 650 mg  650 mg Rectal Q4H PRN Christopher Alvarez MD       • aspirin tablet 325 mg  325 mg Oral Daily Bandar De Guzman APRN   325 mg at 03/09/21 0945   • atorvastatin (LIPITOR) tablet 80 mg  80 mg Oral Daily Christopher Alvarez MD   80 mg at 03/09/21 0945   • clopidogrel (PLAVIX) tablet 75 mg  75 mg Oral Daily Bandar De Guzman APRN   75 mg at 03/09/21 0945   • enalaprilat (VASOTEC) injection 1.25 mg  1.25 mg Intravenous Q6H PRN Christopher Alvarez MD   1.25 mg at 03/08/21 1905   • enoxaparin (LOVENOX)  syringe 40 mg  40 mg Subcutaneous Q24H Christopher Alvarez MD   40 mg at 21 1623   • influenza vac split quad (FLUZONE,FLUARIX,AFLURIA,FLULAVAL) injection 0.5 mL  0.5 mL Intramuscular During Hospitalization Christopher Alvarez MD       • losartan (COZAAR) tablet 50 mg  50 mg Oral Q24H Christopher Alvarez MD   50 mg at 21 0945   • niCARdipine (CARDENE) 25 mg in 250 mL NS infusion  1-10 mg/hr Intravenous Titrated Jean Tierney MD   Stopped at 21 1200   • ondansetron (ZOFRAN) tablet 4 mg  4 mg Oral Q6H PRN Christopher Alvarez MD        Or   • ondansetron (ZOFRAN) injection 4 mg  4 mg Intravenous Q6H PRN Christopher Alvarez MD       • pantoprazole (PROTONIX) injection 40 mg  40 mg Intravenous Q AM Christopher Alvarez MD   40 mg at 21 0528   • sodium chloride 0.9 % flush 10 mL  10 mL Intravenous PRN Christopher Alvarez MD       • sodium chloride 0.9 % flush 10 mL  10 mL Intravenous Q12H Christopher Alvarez MD   10 mL at 21 0945   • sodium chloride 0.9 % flush 10 mL  10 mL Intravenous PRN Christopher Alvarez MD       • sodium chloride 0.9 % infusion  75 mL/hr Intravenous Continuous Jean Tierney MD 75 mL/hr at 21 0529 75 mL/hr at 21 0529     Orders (last 24 hrs)      Start     Ordered    21 0800  influenza vac split quad (FLUZONE,FLUARIX,AFLURIA,FLULAVAL) injection 0.5 mL  During Hospitalization      21 1549    21 0000  Mobile Cardiac Outpatient Telemetry     Comments: 30 days    21 0916    21 1302  PT Plan of Care Cert / Re-Cert  Once     Comments: Physical Therapy Plan of Care  Initial Certification  Certification Period: 3/8/2021 - 2021    Patient Name: Josi Roe  : 1945    (I63.9) Cerebrovascular accident (CVA), unspecified mechanism (CMS/HCC)  (primary encounter diagnosis)  (I16.1) Hypertensive emergency  (Z74.09,  Z78.9) Impaired mobility and ADLs                  Assessment  PT Assessment  Criteria for  Skilled Interventions Met (PT): yes        Rehab Goal Summary     Row Name 03/08/21 1130 03/08/21 0959          Bed Mobility Goal 1 (PT)    Activity/Assistive Device (Bed Mobility Goal 1, PT)  bed mobility   activities, all  -GB  -     [place] Level/Cues Needed (Bed Mobility Goal 1, PT)    independent;modified independence  -GB  -     Time Frame (Bed Mobility Goal 1, PT)  by discharge  -GB  -     Progress/Outcomes (Bed Mobility Goal 1, PT)  goal not met  -GB  -        Transfer Goal 1 (PT)    Activity/Assistive Device (Transfer Goal 1, PT)    bed-to-chair/chair-to-bed  -GB  -     [place] Level/Cues Needed (Transfer Goal 1, PT)    independent;modified independence  -GB  -     Time Frame (Transfer Goal 1, PT)  by discharge  -GB  -     Progress/Outcome (Transfer Goal 1, PT)  goal not met  -GB  -        Gait Training Goal 1 (PT)    Activity/Assistive Device (Gait Training Goal 1, PT)  gait (walking   locomotion);assistive device use  -GB  -     [place] Level (Gait Training Goal 1, PT)  modified independence  -GB    -     Distance (Gait Training Goal 1, PT)  150 ft or more  -GB  -     Time Frame (Gait Training Goal 1, PT)  by discharge  -GB  -        Stairs Goal 1 (PT)    Activity/Assistive Device (Stairs Goal 1, PT)  ascending   stairs;descending stairs  -GB  -     [place] Level/Cues Needed (Stairs Goal 1, PT)  modified   independence;independent  -GB  -     Number of Stairs (Stairs Goal 1, PT)  2 or more  -GB  -     Time Frame (Stairs Goal 1, PT)  by discharge  -GB  -     Progress/Outcome (Stairs Goal 1, PT)  goal not met  -GB  -        Patient Education Goal (PT)    Activity (Patient Education Goal, PT)  pt will be skilled w/ use of FWRW   for safety  -GB  -     [place]/Cues/Accuracy (Memory Goal 2, PT)  demonstrates adequately    -GB  -     Time Frame (Patient Education Goal, PT)  by discharge  -GB  -     Progress/Outcome (Patient Education Goal, PT)  goal not met  -GB  -        Transfer Goal  1 (OT)    Activity/Assistive Device (Transfer Goal 1, OT)  -  toilet  -ME     [place] Level/Cues Needed (Transfer Goal 1, OT)  -  supervision   required  -ME     Time Frame (Transfer Goal 1, OT)  -  long term goal (LTG);by discharge    -ME     Progress/Outcome (Transfer Goal 1, OT)  -  goal not met  -ME        Bathing Goal 1 (OT)    Activity/Device (Bathing Goal 1, OT)  -  lower body bathing AD as needed    -ME     [place] Level/Cues Needed (Bathing Goal 1, OT)  -  supervision   required  -ME     Time Frame (Bathing Goal 1, OT)  -  long term goal (LTG);by discharge    -ME     Progress/Outcomes (Bathing Goal 1, OT)  -  goal not met  -ME        Dressing Goal 1 (OT)    Activity/Device (Dressing Goal 1, OT)  -  lower body dressing AD as   needed  -ME     [place]/Cues Needed (Dressing Goal 1, OT)  -  supervision required    -ME     Time Frame (Dressing Goal 1, OT)  -  long term goal (LTG);by discharge    -ME     Progress/Outcome (Dressing Goal 1, OT)  -  goal not met  -ME       User Key  (r) = Recorded By, (t) = Taken By, (c) = Cosigned By    Initials Name Provider Type Discipline    Mounika Rangel, PT Physical Therapist PT    Latonia Barnes OTR/L Occupational Therapist OT      PT OP Goals     Row Name 03/08/21 1229          Time Calculation    PT Goal Re-Cert Due Date  03/17/21  -VASHTI       User Key  (r) = Recorded By, (t) = Taken By, (c) = Cosigned By    Initials Name Provider Type    Mounika Rangel, PT Physical Therapist            Plan  PT Plan  Therapy Frequency (PT): daily (bid if/as able)  Predicted Duration of Therapy Intervention (PT): 2-4 d inpt, 3-5 wks outpt prn  Planned Therapy Interventions (PT): bed mobility training, balance training, neuromuscular re-education, motor coordination training, home exercise program, gait training, transfer training, strengthening, stair training  Outcome Summary: PT eval completed at bedside. Pt previously indep in home and  community mobility & ADLs. Dtr local but not close by but can stay w/ her some of the time if d/c home per pt. Pt able to move w/out obligation to abnormal tone but w/ hx of muna club feet she has some unsteadiness noted in gait that could be premorbid as well as CVA/TIA  related. She moved all 4 E'x w/out abnormal tone. Sit<>Stand, gait w/ supervision/CGA 1 for stabilty as needed. She used FWRW but did not keep it close to feet as requested, pushing it at least 1 fot ahead of her putting her in full fwd flx. She was cued 3 times to keep walker closer than out at arms length. She has hx club foot sx muna as child and feet very small so her base of support may be a functional issue as well more than the TIA/CVA, difficutly  them out. She reports yesterday her R side was weaker and this is noted        Mounika Anand, PT  3/8/2021            By cosigning this order, either electronically or physically, I certify that the therapy services are furnished while this patient is under my care, the services outline above are required by this patient, and will be reviewed every 90 days.        M.D.:__________________________________________ Date: ______________    21 1302    21 1141  Update Accommodation Code  Once      21 1140    21 1116  OT Plan of Care Cert / Re-Cert  Once     Comments: Occupational Therapy Plan of Care  Initial Certification  Certification Period: 3/8/2021 - 2021    Patient Name: Josi Roe  : 1945    (I63.9) Cerebrovascular accident (CVA), unspecified mechanism (CMS/HCC)  (primary encounter diagnosis)  (I16.1) Hypertensive emergency  (Z74.09,  Z78.9) Impaired mobility and ADLs                Assessment  OT Assessment  Rehab Potential (OT): good, to achieve stated therapy goals  Criteria for Skilled Therapeutic Interventions Met (OT): yes, meets criteria, skilled treatment is necessary        Rehab Goal Summary     Row Name 21 0959              Transfer Goal 1 (OT)    Activity/Assistive Device (Transfer Goal 1, OT)  toilet  -ME      [place] Level/Cues Needed (Transfer Goal 1, OT)  supervision   required  -ME      Time Frame (Transfer Goal 1, OT)  long term goal (LTG);by discharge  -ME        Progress/Outcome (Transfer Goal 1, OT)  goal not met  -ME         Bathing Goal 1 (OT)    Activity/Device (Bathing Goal 1, OT)  lower body bathing AD as needed    -ME      [place] Level/Cues Needed (Bathing Goal 1, OT)  supervision required    -ME      Time Frame (Bathing Goal 1, OT)  long term goal (LTG);by discharge  -ME        Progress/Outcomes (Bathing Goal 1, OT)  goal not met  -ME         Dressing Goal 1 (OT)    Activity/Device (Dressing Goal 1, OT)  lower body dressing AD as needed    -ME      [place]/Cues Needed (Dressing Goal 1, OT)  supervision required  -ME        Time Frame (Dressing Goal 1, OT)  long term goal (LTG);by discharge  -ME        Progress/Outcome (Dressing Goal 1, OT)  goal not met  -ME        User Key  (r) = Recorded By, (t) = Taken By, (c) = Cosigned By    Initials Name Provider Type Discipline    Latonia Barnes, OTR/L Occupational Therapist OT        OT Goals     Row Name 03/08/21 1114          Time Calculation    OT Goal Re-Cert Due Date  03/21/21  -ME       User Key  (r) = Recorded By, (t) = Taken By, (c) = Cosigned By    Initials Name Provider Type    Latonia Barnes, OTR/L Occupational Therapist          Plan    OT Plan  Therapy Frequency (OT): daily  Predicted Duration of Therapy Intervention (OT): until d/c or all goals met  Outcome Summary: initial OT evaluation complete. pt was pleasant and cooperative throughout. oriented x 4. complete sup to sit bed mobility with supervision, increased time required for task completion. was dependent for donning socks. min A for donning/doffing hospital gown while sitting EOB. completed sit to stand transfers x 2 with min A/no RW. completed bed to chair transfer with min A.  hand held assistance required. pt reaching for furniture with mobility and transfers. ambulated at bedside with min A. poor balance upon initial standing, but improved somewhat with increased time and time in standing. recommended use of RW to pt as pt balance is decreased at this time. BUE ROM WFL grossly. BUE strength and bilateral  strength are grossly 4/5. strength is equal on both sides. no coordination deficits identified. recommend further skilled OT services to address functional mobility and ADL deficits, as well as balance, strength, and endurance. recommend home with 24/7 care at discharge. spoke with pt about possibly having daugther stay with her once she gets home. goals established.      Latonia See OTR/L  3/8/2021        By cosigning this order, either electronically or physically, I certify that the therapy services are furnished while this patient is under my care, the services outline above are required by this patient, and will be reviewed every 90 days.        M.D.:__________________________________________ Date: ______________    03/08/21 1115    03/08/21 1045  losartan (COZAAR) tablet 50 mg  Every 24 Hours Scheduled      03/08/21 0945    03/08/21 0945  enalaprilat (VASOTEC) injection 1.25 mg  Every 6 Hours PRN      03/08/21 0946    03/08/21 0900  aspirin tablet 325 mg  Daily      03/08/21 0630    03/08/21 0900  clopidogrel (PLAVIX) tablet 75 mg  Daily      03/08/21 0630    03/08/21 0600  pantoprazole (PROTONIX) injection 40 mg  Every Early Morning      03/07/21 1458    03/07/21 2100  sodium chloride 0.9 % flush 10 mL  Every 12 Hours Scheduled      03/07/21 1458    03/07/21 2000  niCARdipine (CARDENE) 25 mg in 250 mL NS infusion  Titrated      03/07/21 1910    03/07/21 2000  sodium chloride 0.9 % infusion  Continuous      03/07/21 1910    03/07/21 1645  enoxaparin (LOVENOX) syringe 40 mg  Every 24 Hours      03/07/21 1458    03/07/21 1600  Vital Signs  Every 4 Hours      03/07/21 145     03/07/21 1500  atorvastatin (LIPITOR) tablet 80 mg  Daily      03/07/21 1410    03/07/21 1500  Strict Intake & Output  Every Hour      03/07/21 1458    03/07/21 1458  ondansetron (ZOFRAN) tablet 4 mg  Every 6 Hours PRN      03/07/21 1458    03/07/21 1458  ondansetron (ZOFRAN) injection 4 mg  Every 6 Hours PRN      03/07/21 1458    03/07/21 1458  acetaminophen (TYLENOL) tablet 650 mg  Every 4 Hours PRN      03/07/21 1458    03/07/21 1458  acetaminophen (TYLENOL) 160 MG/5ML solution 650 mg  Every 4 Hours PRN      03/07/21 1458    03/07/21 1458  acetaminophen (TYLENOL) suppository 650 mg  Every 4 Hours PRN      03/07/21 1458    03/07/21 1457  Intake & Output  Every Shift      03/07/21 1458    03/07/21 1456  sodium chloride 0.9 % flush 10 mL  As Needed      03/07/21 1458    03/07/21 0931  sodium chloride 0.9 % flush 10 mL  As Needed      03/07/21 0931    Unscheduled  ECG 12 Lead  As Needed      03/07/21 0931    --  SCANNED EKG      03/07/21 0000    --  SCANNED EKG      03/07/21 0000                   Physician Progress Notes (last 48 hours) (Notes from 03/07/21 0953 through 03/09/21 0953)      Bandar De Guzman APRN at 03/09/21 0800          Stroke Progress Note       Chief Complaint: Slurred speech    Subjective     HPI: Pt is a 75-year-old right-handed white female with known diagnosis of hypertension, on as needed medications, hyperlipidemia, who was brought in by her granddaughter for slurred speech. Upon exam strengths are equal 5/5. Denies weakness, numbness, or vision changes. PT recommends IRF or home with 24/7 care for now. Pt states interest in IRF before home.       Review of Systems   HENT: Negative.    Eyes: Negative.    Respiratory: Negative.    Cardiovascular:        Bradycardia.   Musculoskeletal: Negative.    Neurological: Negative.    Hematological: Negative.    Psychiatric/Behavioral: Negative.         Objective      Temp:  [96.7 °F (35.9 °C)-98.5 °F (36.9 °C)] 98.5 °F (36.9 °C)  Heart Rate:  [42-72]  42  Resp:  [18] 18  BP: (141-177)/(61-86) 153/67    Neurological Exam  Mental Status  Awake, alert and oriented to person, place and time.Alert. Recent and remote memory are intact. Speech is normal. Language is fluent with no aphasia. Attention and concentration are normal. Fund of knowledge is appropriate for level of education.    Cranial Nerves  CN II: Visual acuity is normal. Visual fields full to confrontation.  CN III, IV, VI: Extraocular movements intact bilaterally. Normal lids and orbits bilaterally. Pupils equal round and reactive to light bilaterally.  CN V: Facial sensation is normal.  CN VII: Full and symmetric facial movement.  CN IX, X: Palate elevates symmetrically. Normal gag reflex.  CN XI: Shoulder shrug strength is normal.  CN XII: Tongue midline without atrophy or fasciculations.    Motor  Normal muscle bulk throughout. No fasciculations present. Strength is 5/5 throughout all four extremities.    Sensory  Sensation is intact to light touch, pinprick, vibration and proprioception in all four extremities.    Reflexes  Not tested..    Coordination  Finger-to-nose, rapid alternating movements and heel-to-shin normal bilaterally without dysmetria.    Gait  Not tested..      Physical Exam  Vitals and nursing note reviewed.   Constitutional:       Appearance: Normal appearance.   HENT:      Head: Normocephalic and atraumatic.   Eyes:      General: Lids are normal.      Extraocular Movements: Extraocular movements intact.      Pupils: Pupils are equal, round, and reactive to light.   Cardiovascular:      Rate and Rhythm: Bradycardia present.   Pulmonary:      Effort: Pulmonary effort is normal.   Musculoskeletal:         General: Normal range of motion.      Cervical back: Normal range of motion.   Neurological:      General: No focal deficit present.      Mental Status: She is alert and oriented to person, place, and time.      Coordination: Coordination is intact.      Deep Tendon Reflexes: Strength  normal.   Psychiatric:         Mood and Affect: Mood normal.         Speech: Speech normal.         Results Review:    I reviewed the patient's new clinical results.    Lab Results (last 24 hours)     ** No results found for the last 24 hours. **        Adult Transthoracic Echo Complete W/ Cont if Necessary Per Protocol    Addendum Date: 3/8/2021    · Left ventricular wall thickness is consistent with mild to moderate concentric hypertrophy. · Estimated left ventricular EF = 61% Left ventricular ejection fraction appears to be 61 - 65%. Left ventricular systolic function is normal. · Left ventricular diastolic function is consistent with (grade I) impaired relaxation. · Left atrial volume is mildly increased. · Saline test results are negative.      CT Head Without Contrast    Result Date: 3/7/2021  Old infarct left basal ganglia, caudate nucleus. Old lacunar infarct right thalamus. CT brain without contrast is otherwise unremarkable. There are no acute changes. Electronically signed by:  Favian Pittman MD  3/7/2021 11:25 AM CST Workstation: [id number]    MRI Brain Without Contrast    Result Date: 3/7/2021  CONCLUSION: Acute 1.5 cm infarct left side of the renny. Old 1.9 cm infarct anterior aspect of the left basal ganglia. Old thalamic and basal ganglia lacunar infarcts. Minimal small vessel disease. Cerebral atrophy. Report called at approximately 1:50 PM CST. [id number] Electronically signed by:  Carlos Keith MD  3/7/2021 1:38 PM CST Workstation: [id number]    Result Date: 3/7/2021  No evidence of active disease. Electronically signed by:  Favian Pittman MD  3/7/2021 10:06 AM CST Workstation: [id number]    CT Angiogram Carotids    Result Date: 3/7/2021  Unremarkable CT angiography of the brain. Unremarkable CT angiography of the neck. Note is made of multiple bilateral thyroid nodules, the largest nodule seen in the right side and measuring 2.4 cm and can be evaluated electively with an ultrasound of the  thyroid Electronically signed by:  Christian Davis MD  3/7/2021 4:43 PM CST Workstation: [id number]    CT Angiogram Head    Result Date: 3/7/2021  Unremarkable CT angiography of the brain. Unremarkable CT angiography of the neck. Note is made of multiple bilateral thyroid nodules, the largest nodule seen in the right side and measuring 2.4 cm and can be evaluated electively with an ultrasound of the thyroid Electronically signed by:  Christian Davis MD  3/7/2021 4:43 PM CST Workstation: [id number]    Results for orders placed during the hospital encounter of 03/07/21    Adult Transthoracic Echo Complete W/ Cont if Necessary Per Protocol    Interpretation Summary  · Left ventricular wall thickness is consistent with mild to moderate concentric hypertrophy.  · Estimated left ventricular EF = 61% Left ventricular ejection fraction appears to be 61 - 65%. Left ventricular systolic function is normal.  · Left ventricular diastolic function is consistent with (grade I) impaired relaxation.  · Left atrial volume is mildly increased.  · Saline test results are negative.        Assessment/Plan     Assessment/Plan: Pt is a 75-year-old right-handed white female with known diagnosis of hypertension, on as needed medications, hyperlipidemia, who was brought in by her granddaughter for slurred speech. Upon exam strengths are equal 5/5. Denies weakness, numbness, or vision changes. PT recommends IRF or home with 24/7 care for now. Pt states interest in IRF before home.   1. Left pontine stroke- Likely lacunar d/t uncontrolled hypertension. Continue aspirin 325 mg, Plavix 75 mg, and Lipitor 80 mg/day for secondary stroke prevention. Echo shows EF 61-65% and no left atrial dilation, volume is mildly increased. Recommend 30 day event monitor at discharge.  2. Essential hypertension- Off cardene gtt. Instructed on the importance of daily BP monitoring and follow-up with PCP to reduce stroke risk.  3. Hyperlipidemia- LDL 81, cont  Lipitor 80 mg/day for secondary stroke prevention.  4. Sinus Bradycardia- Her HR is still in the 40's at times. Recommend continue monitoring and follow-up with cardiology.   5. Activity- Cont to work with PT/OT.  6. Diet- Heart-healthy diet.   Case was discussed with pt, Dr. Potter, Hospitalist team, and nursing. Will sign off for now.          Patient Active Problem List   Diagnosis   • Cerebrovascular accident (CVA) (CMS/[facility])           FIDEL Klein  03/09/21  08:00 CST        Electronically signed by Bandar De Guzman APRN at 03/09/21 0926     [facility] MD at 03/08/21 0937              [facility]  INPATIENT PROGRESS NOTE    Length of Stay: 1  Date of Admission: 3/7/2021  Primary Care Physician: Debra Wilkes MD    Subjective   Chief Complaint: No new complaints.    HPI: Patient is seen for follow-up.  She is a 75-year-old female with history of hypertension and hearing loss who was admitted for left pontine stroke with hypertensive urgency.  She is doing better, tolerating diet, less deconditioned and her speech/right-sided weakness are improving.  She voices no new complaints.    Review of Systems   Constitutional: Positive for activity change and fatigue. Negative for appetite change, chills, diaphoresis and fever.   HENT: Positive for hearing loss. Negative for trouble swallowing and voice change.    Eyes: Negative for photophobia and visual disturbance.   Respiratory: Negative for cough, choking, chest tightness, shortness of breath, wheezing and stridor.    Cardiovascular: Negative for chest pain, palpitations and leg swelling.   Gastrointestinal: Negative for abdominal distention, abdominal pain, blood in stool, constipation, diarrhea, nausea and vomiting.   Endocrine: Negative for cold intolerance, heat intolerance, polydipsia, polyphagia and polyuria.   Genitourinary: Negative for decreased urine volume, difficulty  urinating, dysuria, enuresis, flank pain, frequency, hematuria and urgency.   Musculoskeletal: Negative for arthralgias, gait problem, myalgias, neck pain and neck stiffness.   Skin: Negative for pallor, rash and wound.   Neurological: Positive for facial asymmetry, speech difficulty and weakness. Negative for dizziness, tremors, seizures, syncope, light-headedness, numbness and headaches.   Hematological: Does not bruise/bleed easily.   Psychiatric/Behavioral: Negative for agitation, behavioral problems and confusion.       Objective    Temp:  [97.1 °F (36.2 °C)-97.8 °F (36.6 °C)] 97.1 °F (36.2 °C)  Heart Rate:  [36-84] 72  Resp:  [14-25] 18  BP: ()/() 163/83    Physical Exam  Vitals and nursing note reviewed.   Constitutional:       General: She is not in acute distress.     Appearance: She is well-developed. She is not diaphoretic.   HENT:      Head: Normocephalic and atraumatic.      Comments: She is a little hard of hearing.     Right Ear: External ear normal.      Left Ear: External ear normal.      Nose: Nose normal.   Eyes:      Conjunctiva/sclera: Conjunctivae normal.      Pupils: Pupils are equal, round, and reactive to light.   Neck:      Thyroid: No thyromegaly.      Vascular: No JVD.   Cardiovascular:      Rate and Rhythm: Normal rate and regular rhythm.      Heart sounds: Normal heart sounds. No murmur. No friction rub. No gallop.    Pulmonary:      Effort: Pulmonary effort is normal. No respiratory distress.      Breath sounds: Normal breath sounds. No stridor. No wheezing or rales.   Chest:      Chest wall: No tenderness.   Abdominal:      General: Bowel sounds are normal. There is no distension.      Palpations: Abdomen is soft. There is no mass.      Tenderness: There is no abdominal tenderness. There is no guarding or rebound.      Hernia: No hernia is present.   Musculoskeletal:         General: No swelling, tenderness or deformity. Normal range of motion.      Cervical back: Normal  range of motion and neck supple.      Right lower leg: No edema.   Skin:     General: Skin is warm and dry.      Coloration: Skin is not jaundiced or pale.      Findings: No erythema or rash.   Neurological:      Mental Status: She is alert and oriented to person, place, and time.      Cranial Nerves: Facial asymmetry present.      Sensory: No sensory deficit.      Motor: Weakness present.      Coordination: Coordination normal.      Deep Tendon Reflexes: Reflexes are normal and symmetric.      Comments: She has mild right-sided hemiparesis.   Psychiatric:         Mood and Affect: Mood normal.         Behavior: Behavior normal. Behavior is cooperative.         Thought Content: Thought content normal.         Judgment: Judgment normal.         Medication Review:    Current Facility-Administered Medications:   •  acetaminophen (TYLENOL) tablet 650 mg, 650 mg, Oral, Q4H PRN **OR** acetaminophen (TYLENOL) 160 MG/5ML solution 650 mg, 650 mg, Oral, Q4H PRN **OR** acetaminophen (TYLENOL) suppository 650 mg, 650 mg, Rectal, Q4H PRN, Christopher Alvarez MD  •  aspirin tablet 325 mg, 325 mg, Oral, Daily, Bandar De Guzman APRN, 325 mg at 03/08/21 0812  •  atorvastatin (LIPITOR) tablet 80 mg, 80 mg, Oral, Daily, Christopher Alvarez MD, 80 mg at 03/08/21 0812  •  clopidogrel (PLAVIX) tablet 75 mg, 75 mg, Oral, Daily, Bandar De Guzman APRARMANDO, 75 mg at 03/08/21 0812  •  enoxaparin (LOVENOX) syringe 40 mg, 40 mg, Subcutaneous, Q24H, Christopher Alvarez MD, 40 mg at 03/07/21 1611  •  [START ON 3/9/2021] influenza vac split quad (FLUZONE,FLUARIX,AFLURIA,FLULAVAL) injection 0.5 mL, 0.5 mL, Intramuscular, During Hospitalization, Christopher Alvarez MD  •  niCARdipine (CARDENE) 25 mg in 250 mL NS infusion, 1-10 mg/hr, Intravenous, Titrated, Jean Tierney MD, Last Rate: 30 mL/hr at 03/08/21 0907, 3 mg/hr at 03/08/21 0907  •  ondansetron (ZOFRAN) tablet 4 mg, 4 mg, Oral, Q6H PRN **OR** ondansetron (ZOFRAN) injection 4 mg, 4 mg,  Intravenous, Q6H PRN, Christopher Alvarez MD  •  pantoprazole (PROTONIX) injection 40 mg, 40 mg, Intravenous, Q AM, Christopher Alvarez MD, 40 mg at 03/08/21 0553  •  sodium chloride 0.9 % flush 10 mL, 10 mL, Intravenous, PRN, Christopher Alvarez MD  •  sodium chloride 0.9 % flush 10 mL, 10 mL, Intravenous, Q12H, Christopher Alvarez MD, 10 mL at 03/08/21 0813  •  sodium chloride 0.9 % flush 10 mL, 10 mL, Intravenous, PRN, Christopher Alvarez MD  •  sodium chloride 0.9 % infusion, 75 mL/hr, Intravenous, Continuous, Jean Tierney MD, Last Rate: 75 mL/hr at 03/07/21 1957, 75 mL/hr at 03/07/21 1957    Results Review:  I have reviewed the labs, radiology results, and diagnostic studies.    Laboratory Data:   Results from last 7 days   Lab Units 03/08/21 0311 03/07/21  0932 03/03/21  1215   SODIUM mmol/L 141 140  --    POTASSIUM mmol/L 3.8 4.2  --    CHLORIDE mmol/L 110* 106  --    CO2 mmol/L 24.0 27.0  --    BUN mg/dL 20 30* 36*   CREATININE mg/dL 0.79 0.84 0.99   GLUCOSE mg/dL 77 75  --    CALCIUM mg/dL 11.2* 11.9*  --    BILIRUBIN mg/dL  --  0.5  --    ALK PHOS U/L  --  58  --    ALT (SGPT) U/L  --  20  --    AST (SGOT) U/L  --  14  --    ANION GAP mmol/L 7.0 7.0  --      Estimated Creatinine Clearance: 57.7 mL/min (by C-G formula based on SCr of 0.79 mg/dL).          Results from last 7 days   Lab Units 03/08/21 0311 03/07/21  0932   WBC 10*3/mm3 9.24 11.18*   HEMOGLOBIN g/dL 14.3 14.1   HEMATOCRIT % 43.0 41.4   PLATELETS 10*3/mm3 237 278           Culture Data:   No results found for: BLOODCX  No results found for: URINECX  No results found for: RESPCX  No results found for: WOUNDCX  No results found for: STOOLCX  No components found for: BODYFLD    Radiology Data:   Imaging Results (Last 24 Hours)     Procedure Component Value Units Date/Time    CT Angiogram Head [[id number]] Collected: 03/07/21 1520     Updated: 03/07/21 1644    Narrative:      PROCEDURE: CT HEAD ANGIOGRAPHY WITH IV CONTRAST, CT  NECK  ANGIOGRAPHY WITH IV CONTRAST    CLINICAL HISTORY: L pontine stroke, I63.9 Cerebral infarction,  unspecified I16.1 Hypertensive emergency    INDICATION: Same as above    Comparison: MRI of the brain done on the same day    TECHNIQUE:     CT angiography of the head and neck neck was done with  intravenous contrast followed by 3-D rendering of the  intracranial and neck arterial vasculature.    The patient was injected with 90 mL of Isovue-370 intravenously,  without any documented immediate adverse reactions.    This exam was performed according to the departmental  dose-optimization program which includes automated exposure  control, adjustment of the mA and/or kV according to patient size  and/or use of iterative reconstruction technique.      NASCET criteria was utilized in evaluation of arterial stenosis.    FINDINGS:     CTA OF THE HEAD:    There is no evidence of hemodynamically significant stenosis or a  focal aneurysm formation in the visualized intracranial portion  the bilateral internal carotid arteries, carotid siphon region,  bilateral anterior, middle and posterior cerebral arteries and  their branches.    The basilar tip is normal.     Antegrade flow is seen in the bilateral vertebral arteries.    Evaluation of the superior sagittal sinus, vein of Minh region,  torcula, straight sinus, bilateral internal cerebral veins,  bilateral sigmoid sinuses and the jugular foramina bilaterally do  not show any gross abnormalities.    CTA OF THE NECK:    There is no evidence of hemodynamically significant stenosis or a  focal aneurysm formation in the bilateral common carotid and the  bilateral internal carotid arteries.    Tortuosity of the bilateral internal carotid arteries in the  cervical region is noted    Antegrade flow is seen in the bilateral vertebral arteries.    The takeoff of the great vessels from the aortic arch region is  unremarkable.    The visualized cervical spine shows multilevel  degenerative  change .    The prevertebral soft tissues unremarkable.    The laryngotracheal airway is widely patent. Note is made of  multiple bilateral thyroid nodules, the largest nodule seen in  the right side and measuring 2.4 cm and can be evaluated  electively with an ultrasound of the thyroid    The visualized lung apices are unremarkable .      Impression:        Unremarkable CT angiography of the brain.  Unremarkable CT angiography of the neck.   Note is made of multiple bilateral thyroid nodules, the largest  nodule seen in the right side and measuring 2.4 cm and can be  evaluated electively with an ultrasound of the thyroid    Electronically signed by:  Christian Davis MD  3/7/2021 4:43 PM [facility]  Workstation: [id number]    CT Angiogram Carotids [[id number]] Collected: 03/07/21 1520     Updated: 03/07/21 1644    Narrative:      PROCEDURE: CT HEAD ANGIOGRAPHY WITH IV CONTRAST, CT NECK  ANGIOGRAPHY WITH IV CONTRAST    CLINICAL HISTORY: L pontine stroke, I63.9 Cerebral infarction,  unspecified I16.1 Hypertensive emergency    INDICATION: Same as above    Comparison: MRI of the brain done on the same day    TECHNIQUE:     CT angiography of the head and neck neck was done with  intravenous contrast followed by 3-D rendering of the  intracranial and neck arterial vasculature.    The patient was injected with 90 mL of Isovue-370 intravenously,  without any documented immediate adverse reactions.    This exam was performed according to the departmental  dose-optimization program which includes automated exposure  control, adjustment of the mA and/or kV according to patient size  and/or use of iterative reconstruction technique.      NASCET criteria was utilized in evaluation of arterial stenosis.    FINDINGS:     CTA OF THE HEAD:    There is no evidence of hemodynamically significant stenosis or a  focal aneurysm formation in the visualized intracranial portion  the bilateral internal carotid arteries, carotid  siphon region,  bilateral anterior, middle and posterior cerebral arteries and  their branches.    The basilar tip is normal.     Antegrade flow is seen in the bilateral vertebral arteries.    Evaluation of the superior sagittal sinus, vein of Minh region,  torcula, straight sinus, bilateral internal cerebral veins,  bilateral sigmoid sinuses and the jugular foramina bilaterally do  not show any gross abnormalities.    CTA OF THE NECK:    There is no evidence of hemodynamically significant stenosis or a  focal aneurysm formation in the bilateral common carotid and the  bilateral internal carotid arteries.    Tortuosity of the bilateral internal carotid arteries in the  cervical region is noted    Antegrade flow is seen in the bilateral vertebral arteries.    The takeoff of the great vessels from the aortic arch region is  unremarkable.    The visualized cervical spine shows multilevel degenerative  change .    The prevertebral soft tissues unremarkable.    The laryngotracheal airway is widely patent. Note is made of  multiple bilateral thyroid nodules, the largest nodule seen in  the right side and measuring 2.4 cm and can be evaluated  electively with an ultrasound of the thyroid    The visualized lung apices are unremarkable .      Impression:        Unremarkable CT angiography of the brain.  Unremarkable CT angiography of the neck.   Note is made of multiple bilateral thyroid nodules, the largest  nodule seen in the right side and measuring 2.4 cm and can be  evaluated electively with an ultrasound of the thyroid    Electronically signed by:  Christian Davis MD  3/7/2021 4:43 PM CST  Workstation: [email or web address]          I have reviewed the patient's current medications.     Assessment/Plan     Hospital Problem List:  Active Problems:    Cerebrovascular accident (CVA) (CMS/HCC)    Left pontine stroke (complicated by right-sided hemiparesis and dysarthria): Continue high intensity statin, DAPT with PT, OT and speech  therapy.  CT angiogram of head and neck was unremarkable and transthoracic echocardiogram is pending. COVID-19 PCR is negative.  Input by the neurologist is appreciated.     Hypertensive urgency: Blood pressure  is improved with Cardene infusion.  Discontinued Cardene infusion and transition to oral medication with adjustments for optimal blood pressure control.     Transient bradycardia: Heart rate has improved.  Continue to monitor.       Continue GI and DVT prophylaxis    Discharge Planning: In progress.    Christopher Alvarez MD   03/08/21   09:37 CST          Electronically signed by Christopher Alvarez MD at 03/08/21 0944     Bandar De Guzman APRN at 03/08/21 0905          Stroke Progress Note       Chief Complaint: Slurred speech    Subjective     HPI: Pt is a 75-year-old right-handed white female with known diagnosis of hypertension, on as needed medications, hyperlipidemia, who was brought in by her granddaughter for slurred speech. This morning she states feeling better. Strengths are equal 5/5. Denies weakness, numbness, or vision changes.       Review of Systems   HENT: Positive for hearing loss.    Eyes: Negative.    Respiratory: Negative.    Cardiovascular: Negative.    Gastrointestinal: Negative.    Genitourinary: Negative.    Musculoskeletal: Negative.    Neurological: Negative.    Hematological: Negative.    Psychiatric/Behavioral: Negative.         Objective      Temp:  [97.1 °F (36.2 °C)-98.4 °F (36.9 °C)] 97.1 °F (36.2 °C)  Heart Rate:  [36-84] 53  Resp:  [14-25] 18  BP: ()/() 170/77    Neurological Exam  Mental Status  Awake and alert. Oriented to person, place, time and situation. Recent and remote memory are intact. Speech is normal. Language is fluent with no aphasia. Attention and concentration are normal. Fund of knowledge is appropriate for level of education.    Cranial Nerves  CN II: Visual acuity is normal. Visual fields full to confrontation.  CN III, IV, VI: Extraocular  movements intact bilaterally. Normal lids and orbits bilaterally. Pupils equal round and reactive to light bilaterally.  CN V: Facial sensation is normal.  CN VII: Full and symmetric facial movement.  CN IX, X: Palate elevates symmetrically. Normal gag reflex.  CN XI: Shoulder shrug strength is normal.  CN XII: Tongue midline without atrophy or fasciculations.    Motor  Normal muscle bulk throughout. No fasciculations present. Strength is 5/5 throughout all four extremities.    Sensory  Sensation is intact to light touch, pinprick, vibration and proprioception in all four extremities.    Reflexes  Not tested..    Coordination  Finger-to-nose, rapid alternating movements and heel-to-shin normal bilaterally without dysmetria.    Gait  Not tested..      Physical Exam  Vitals and nursing note reviewed.   Constitutional:       General: She is awake.      Appearance: Normal appearance.   HENT:      Head: Normocephalic and atraumatic.   Eyes:      General: Lids are normal.      Extraocular Movements: Extraocular movements intact.      Pupils: Pupils are equal, round, and reactive to light.   Cardiovascular:      Rate and Rhythm: Normal rate and regular rhythm.   Musculoskeletal:         General: Normal range of motion.      Cervical back: Normal range of motion.   Neurological:      General: No focal deficit present.      Mental Status: She is alert and oriented to person, place, and time.      Coordination: Coordination is intact.      Deep Tendon Reflexes: Strength normal.   Psychiatric:         Mood and Affect: Mood normal.         Speech: Speech normal.         Results Review:    I reviewed the patient's new clinical results.    Lab Results (last 24 hours)     Procedure Component Value Units Date/Time    Lipid Panel [[id number]] Collected: 03/08/21 0311    Specimen: Blood Updated: 03/08/21 0716     Total Cholesterol 162 mg/dL      Triglycerides 145 mg/dL      HDL Cholesterol 56 mg/dL      LDL Cholesterol  81 mg/dL       VLDL Cholesterol 25 mg/dL      LDL/HDL Ratio 1.38    Narrative:      Cholesterol Reference Ranges  (U.S. Department of Health and Human Services ATP III Classifications)    Desirable          <200 mg/dL  Borderline High    200-239 mg/dL  High Risk          >240 mg/dL      Triglyceride Reference Ranges  (U.S. Department of Health and Human Services ATP III Classifications)    Normal           <150 mg/dL  Borderline High  150-199 mg/dL  High             200-499 mg/dL  Very High        >500 mg/dL    HDL Reference Ranges  (U.S. Department of Health and Human Services ATP III Classifcations)    Low     <40 mg/dl (major risk factor for CHD)  High    >60 mg/dl ('negative' risk factor for CHD)        LDL Reference Ranges  (U.S. Department of Health and Human Services ATP III Classifcations)    Optimal          <100 mg/dL  Near Optimal     100-129 mg/dL  Borderline High  130-159 mg/dL  High             160-189 mg/dL  Very High        >189 mg/dL    Hemoglobin A1c [[id number]]  (Abnormal) Collected: 03/08/21 0311    Specimen: Blood Updated: 03/08/21 0710     Hemoglobin A1C 6.20 %     Narrative:      Hemoglobin A1C Ranges:    Increased Risk for Diabetes  5.7% to 6.4%  Diabetes                     >= 6.5%  Diabetic Goal                < 7.0%    Urinalysis With Microscopic If Indicated (No Culture) - Urine, Clean Catch [[id number]]  (Normal) Collected: 03/08/21 0519    Specimen: Urine, Clean Catch Updated: 03/08/21 0527     Color, UA Yellow     Appearance, UA Clear     pH, UA 7.5     Specific Gravity, UA 1.013     Glucose, UA Negative     Ketones, UA Negative     Bilirubin, UA Negative     Blood, UA Negative     Protein, UA Negative     Leuk Esterase, UA Negative     Nitrite, UA Negative     Urobilinogen, UA 0.2 E.U./dL    Narrative:      Urine microscopic not indicated.    Basic Metabolic Panel [[id number]]  (Abnormal) Collected: 03/08/21 0311    Specimen: Blood Updated: 03/08/21 0400     Glucose 77 mg/dL      BUN 20 mg/dL       Creatinine 0.79 mg/dL      Sodium 141 mmol/L      Potassium 3.8 mmol/L      Chloride 110 mmol/L      CO2 24.0 mmol/L      Calcium 11.2 mg/dL      eGFR Non African Amer 71 mL/min/1.73      BUN/Creatinine Ratio 25.3     Anion Gap 7.0 mmol/L     Narrative:      GFR Normal >60  Chronic Kidney Disease <60  Kidney Failure <15      CBC Auto Differential [[id number]]  (Abnormal) Collected: 03/08/21 0311    Specimen: Blood Updated: 03/08/21 0339     WBC 9.24 10*3/mm3      RBC 4.83 10*6/mm3      Hemoglobin 14.3 g/dL      Hematocrit 43.0 %      MCV 89.0 fL      MCH 29.6 pg      MCHC 33.3 g/dL      RDW 13.5 %      RDW-SD 43.7 fl      MPV 11.2 fL      Platelets 237 10*3/mm3      Neutrophil % 60.8 %      Lymphocyte % 25.2 %      Monocyte % 11.6 %      Eosinophil % 1.4 %      Basophil % 0.5 %      Immature Grans % 0.5 %      Neutrophils, Absolute 5.61 10*3/mm3      Lymphocytes, Absolute 2.33 10*3/mm3      Monocytes, Absolute 1.07 10*3/mm3      Eosinophils, Absolute 0.13 10*3/mm3      Basophils, Absolute 0.05 10*3/mm3      Immature Grans, Absolute 0.05 10*3/mm3      nRBC 0.0 /100 WBC         CT Head Without Contrast    Result Date: 3/7/2021  Old infarct left basal ganglia, caudate nucleus. Old lacunar infarct right thalamus. CT brain without contrast is otherwise unremarkable. There are no acute changes. Electronically signed by:  Favian Pittman MD  3/7/2021 11:25 AM CST Workstation: [id number]    MRI Brain Without Contrast    Result Date: 3/7/2021  CONCLUSION: Acute 1.5 cm infarct left side of the renny. Old 1.9 cm infarct anterior aspect of the left basal ganglia. Old thalamic and basal ganglia lacunar infarcts. Minimal small vessel disease. Cerebral atrophy. Report called at approximately 1:50 PM CST. [id number] Electronically signed by:  Carlos Keith MD  3/7/2021 1:38 PM CST Workstation: [email or web address] Chest 1 View    Result Date: 3/7/2021  No evidence of active disease. Electronically signed by:  Favian Pittman MD  3/7/2021 10:06 AM  CST Workstation: [id number]    CT Angiogram Carotids    Result Date: 3/7/2021  Unremarkable CT angiography of the brain. Unremarkable CT angiography of the neck. Note is made of multiple bilateral thyroid nodules, the largest nodule seen in the right side and measuring 2.4 cm and can be evaluated electively with an ultrasound of the thyroid Electronically signed by:  Christian Davis MD  3/7/2021 4:43 PM CST Workstation: [id number]    CT Angiogram Head    Result Date: 3/7/2021  Unremarkable CT angiography of the brain. Unremarkable CT angiography of the neck. Note is made of multiple bilateral thyroid nodules, the largest nodule seen in the right side and measuring 2.4 cm and can be evaluated electively with an ultrasound of the thyroid Electronically signed by:  Christian Davis MD  3/7/2021 4:43 PM CST Workstation: [id number]          Assessment/Plan     Assessment/Plan: Pt is a 75-year-old right-handed white female with known diagnosis of hypertension, on as needed medications, hyperlipidemia, who was brought in by her granddaughter for slurred speech. This morning she states feeling better. Strengths are equal 5/5. Denies weakness, numbness, or vision changes.   1. Left pontine stroke- Likely lacunar d/t uncontrolled hypertension. Continue aspirin 325 mg, Plavix 75 mg, and Lipitor 80 mg/day for secondary stroke prevention. Echo pending.  2. Essential hypertension- Currently on Cardene gtt. Will wean gtt today. Instructed on the importance of daily BP monitoring and follow-up with PCP to reduce stroke risk.  3. Hyperlipidemia- LDL 81, cont Lipitor 80 mg/day for secondary stroke prevention.  4. Sinus Bradycardia- Her HR is still in the 40's at times. Recommend continue monitoring and follow-up with cardiology.   5. Activity- Okay to work with PT/OT today.  6. Diet- Heart-healthy diet.   Case was discussed with pt, Dr. Potter, Hospitalist team, and nursing.           Patient Active Problem List   Diagnosis   •  Cerebrovascular accident (CVA) (CMS/[facility])           FIDEL Klein  03/08/21  09:05 CST        Electronically signed by Bandar De Guzman APRN at 03/08/21 0936        Jessica Amezcua RN  [facility]  [phone]  Fax [phone]

## 2021-03-09 NOTE — CONSULTS
[facility] Cardiology Consult    Referring Provider: Quinton Olivier, *      Reason for Consultation: bradycardia    Patient Care Team:  Debra Wilkes MD as PCP - General (Family Medicine)    Chief complaint   Chief Complaint   Patient presents with   • Balance Issues       Subjective .     History of present illness:     Ms. Roe is a 75 year old patient who presented to [facility] with stroke like symptoms. She has underwent neurology evaluation with CVA diagnosis. MRI 3/7/21:  Acute 1.5 cm infarct left side of the renny.  Old 1.9 cm infarct anterior aspect of the left basal ganglia.  Old thalamic and basal ganglia lacunar infarcts.  Minimal small vessel disease.  Cerebral atrophy.  Cardiology was consulted today due to bradycardia. She has had HR's down in to the 30s. EKG is sinus bradycardia. She had appropriate HR response to exercise (therapy).   Her affect is very flat and she is working through some aphasia. She appears to understand what is going on around her.       Review of Systems  Review of Systems   Unable to perform ROS: Acuity of condition   - CVA with aphasia, flat affect    History  Past Medical History:   Diagnosis Date   • Heart murmur    • Hypertension    ,   Past Surgical History:   Procedure Laterality Date   • CHOLECYSTECTOMY     • CLUB FOOT RELEASE     • HYSTERECTOMY     ,   Family History   Problem Relation Age of Onset   • Thyroid disease Mother    • Cancer Father    • Thyroid disease Sister    • Cancer Paternal Aunt    • Cancer Paternal Uncle    • Heart disease Paternal Uncle    • Diabetes Maternal Grandmother    • Diabetes Maternal Grandfather    ,   Social History     Tobacco Use   • Smoking status: Never Smoker   • Smokeless tobacco: Never Used   Substance Use Topics   • Alcohol use: Never   • Drug use: Never   ,   Medications Prior to Admission   Medication Sig Dispense Refill Last Dose   • cloNIDine (CATAPRES) 0.1 MG tablet Take 0.1 mg by mouth 2 (Two) Times a Day As Needed.  "Uses prn      • predniSONE (DELTASONE) 20 MG tablet 1 by mouth 3 times a day for 4 days one by mouth twice a day for 4 days, then 1 by mouth daily for 4 days. 24 tablet 0    • VITAMIN D, CHOLECALCIFEROL, PO Take 5,000 Units by mouth Daily.         ALLERGIES  Novocain [procaine]    The following portions of the patient's history were reviewed and updated as appropriate: allergies, current medications, past family history, past medical history, past social history, past surgical history and problem list.     Old and outside records reviewed (if available) and pertinent information is included in the objective data.     Objective     Vital Sign Min/Max for last 24 hours  Temp  Min: 96.7 °F (35.9 °C)  Max: 98.6 °F (37 °C)   BP  Min: 141/61  Max: 177/86   Pulse  Min: 42  Max: 63   Resp  Min: 18  Max: 18   SpO2  Min: 95 %  Max: 100 %   Flow (L/min)  Min: 2  Max: 2   Weight  Min: 75.6 kg (166 lb 10.7 oz)  Max: 75.6 kg (166 lb 10.7 oz)     Flowsheet Rows      First Filed Value   Admission Height  154.9 cm (61\") Documented at 03/07/2021 0929   Admission Weight  80 kg (176 lb 6.4 oz) Documented at 03/07/2021 0929             Physical Exam:  Physical Exam  HENT:      Head: Normocephalic.   Eyes:      Pupils: Pupils are equal, round, and reactive to light.   Cardiovascular:      Rate and Rhythm: Regular rhythm. Bradycardia present.   Pulmonary:      Effort: Pulmonary effort is normal.      Breath sounds: Normal breath sounds.   Abdominal:      General: Abdomen is flat.      Palpations: Abdomen is soft.   Musculoskeletal:      Right lower leg: No edema.      Left lower leg: No edema.   Skin:     General: Skin is warm and dry.   Neurological:      Mental Status: She is alert.      Motor: Weakness present.            Results Reviewed by myself:     Results from last 7 days   Lab Units 03/08/21  0311 03/07/21  0932 03/03/21  1215   SODIUM mmol/L 141 140  --    POTASSIUM mmol/L 3.8 4.2  --    CHLORIDE mmol/L 110* 106  --    CO2 " mmol/L 24.0 27.0  --    BUN mg/dL 20 30* 36*   CREATININE mg/dL 0.79 0.84 0.99   CALCIUM mg/dL 11.2* 11.9*  --    BILIRUBIN mg/dL  --  0.5  --    ALK PHOS U/L  --  58  --    ALT (SGPT) U/L  --  20  --    AST (SGOT) U/L  --  14  --    GLUCOSE mg/dL 77 75  --        Estimated Creatinine Clearance: 58.2 mL/min (by C-G formula based on SCr of 0.79 mg/dL).          Results from last 7 days   Lab Units 03/08/21  0311 03/07/21  0932   WBC 10*3/mm3 9.24 11.18*   HEMOGLOBIN g/dL 14.3 14.1   PLATELETS 10*3/mm3 237 278             Lab Results   Component Value Date    TROPONINT <0.010 03/07/2021     Lab Results   Component Value Date    PROBNP 623.1 03/07/2021       I/O last 3 completed shifts:  In: 4607.7 [P.O.:1680; I.V.:2927.7]  Out: 3100 [Urine:3100]    Cardiographics  ECG/EMG Results (last 24 hours)     Procedure Component Value Units Date/Time    ECG 12 Lead [[id number]] Collected: 03/09/21 1125     Updated: 03/09/21 1139     QT Interval 480 ms      QTC Interval 405 ms     Narrative:      Test Reason : Bradycardia  Blood Pressure : **/** mmHG  Vent. Rate : 043 BPM     Atrial Rate : 043 BPM     P-R Int : 130 ms          QRS Dur : 100 ms      QT Int : 480 ms       P-R-T Axes : 040 004 102 degrees     QTc Int : 405 ms    Marked sinus bradycardia  ST &  T wave abnormality, consider lateral ischemia  Abnormal ECG  When compared with ECG of 07-MAR-2021 09:30,  T wave inversion now evident in Lateral leads    Referred By:             Confirmed By:           Results for orders placed during the hospital encounter of 03/07/21    Adult Transthoracic Echo Complete W/ Cont if Necessary Per Protocol    Interpretation Summary  · Left ventricular wall thickness is consistent with mild to moderate concentric hypertrophy.  · Estimated left ventricular EF = 61% Left ventricular ejection fraction appears to be 61 - 65%. Left ventricular systolic function is normal.  · Left ventricular diastolic function is consistent with (grade I) impaired  relaxation.  · Left atrial volume is mildly increased.  · Saline test results are negative.      Adult Transthoracic Echo Complete W/ Cont if Necessary Per Protocol    Addendum Date: 3/8/2021    · Left ventricular wall thickness is consistent with mild to moderate concentric hypertrophy. · Estimated left ventricular EF = 61% Left ventricular ejection fraction appears to be 61 - 65%. Left ventricular systolic function is normal. · Left ventricular diastolic function is consistent with (grade I) impaired relaxation. · Left atrial volume is mildly increased. · Saline test results are negative.      CT Head Without Contrast    Result Date: 3/7/2021  Old infarct left basal ganglia, caudate nucleus. Old lacunar infarct right thalamus. CT brain without contrast is otherwise unremarkable. There are no acute changes. Electronically signed by:  Favian Pittman MD  3/7/2021 11:25 AM [facility] Workstation: [id number]    MRI Brain Without Contrast    Result Date: 3/7/2021  CONCLUSION: Acute 1.5 cm infarct left side of the renny. Old 1.9 cm infarct anterior aspect of the left basal ganglia. Old thalamic and basal ganglia lacunar infarcts. Minimal small vessel disease. Cerebral atrophy. Report called at approximately 1:50 PM [facility]. [id number] Electronically signed by:  Carlos Keith MD  3/7/2021 1:38 PM [facility] Workstation: [email or web address] [id number] 1 View    Result Date: 3/7/2021  No evidence of active disease. Electronically signed by:  Favian Pittman MD  3/7/2021 10:06 AM [facility] Workstation: [id number]    MRI Internal Auditory Canal With Wo    Result Date: 3/3/2021  Involutional, atrophic changes. Periventricular foci of increased signal intensity, chronic small vessel ischemic changes. Old left basal ganglia, caudate nucleus infarct. Additional smaller old lacunar infarcts right  basal ganglia and thalamus. Mild dilatation, ectasia portions of the basilar artery. MRI brain with and without contrast is otherwise unremarkable. Unremarkable MRI examination of  the posterior fossa cerebellopontine angles and internal auditory canals. No posterior fossa masses. No evidence of acoustic neuroma. IMPRESSION:  Unremarkable MRI of the brain with and without contrast. Electronically signed by:  Favian Pittman MD  3/3/2021 5:23 PM [facility] Workstation: [id number]    CT Angiogram Carotids    Result Date: 3/7/2021  Unremarkable CT angiography of the brain. Unremarkable CT angiography of the neck. Note is made of multiple bilateral thyroid nodules, the largest nodule seen in the right side and measuring 2.4 cm and can be evaluated electively with an ultrasound of the thyroid Electronically signed by:  Christian Davis MD  3/7/2021 4:43 PM CST Workstation: [id number]    CT Angiogram Head    Result Date: 3/7/2021  Unremarkable CT angiography of the brain. Unremarkable CT angiography of the neck. Note is made of multiple bilateral thyroid nodules, the largest nodule seen in the right side and measuring 2.4 cm and can be evaluated electively with an ultrasound of the thyroid Electronically signed by:  Christian Davis MD  3/7/2021 4:43 PM CST Workstation: [id number]      Intake/Output       03/08/21 0700 - 03/09/21 0659    Intake (ml) 3736.3    Output (ml) 1700    Net (ml) 2036.3    Last Weight  75.6 kg (166 lb 10.7 oz)            Assessment/Plan       Cerebrovascular accident (CVA) (CMS/[facility])  - neuro following      Sinus Bradycardia  - normal chronotropic response to activity.   - Will need 30 day event monitor as outpatient.  Felt a loop would be indicated, but there may be insurance issues regarding a monitor first.   - She is apparently going to a rehab facility to recover from stroke.   - was on Cardene for HTN. No AV shar blocking medications.   - will check TSH, T4, T3.      Disposition: per attending.     I discussed the patient's findings and my recommendations with patient and nursing staff and Dr. Poole             This document has been electronically signed by FIDEL Scott  on March 9, 2021 12:56 CST      Electronically signed by FIDEL Scott, 03/09/21, 12:56 PM CST.    I personally saw and examined Josi Roe after the APRN.  I personally performed a history and physical examination of the patient.  I personally reviewed independent findings and plan of care.  I discussed management with the APRN.  I agree with the APRN's documentation.    This is a 75-year-old lady with no significant past medical history, reported that she has hypertension which she takes on her clonidine only on as needed basis who presented to the hospital with the acute pontine stroke.  We were consulted for further evaluation for bradycardia.  On my visit patient lying comfortably in bed without acute distress.  She has still has some weakness on the right side of the face but otherwise no focal deficits.    Denies any prior history of arrhythmias, no history of passing out or syncopal episodes or lightheadedness dizziness.    Vitals:    03/09/21 0625 03/09/21 0800 03/09/21 0900 03/09/21 1200   BP:  (!) 167/111  170/79   BP Location:  Left arm  Left arm   Patient Position:  Lying  Lying   Pulse: (!) 42 (!) 43 (!) 48 (!) 44   Resp:  18  18   Temp:  98.6 °F (37 °C)  98.4 °F (36.9 °C)   TempSrc:  Oral  Axillary   SpO2: 96% 99%  100%   Weight:       Height:           Labs reviewed.  TSH was normal  Lipid panel reviewed    EKG and telemetry reviewed    1.  Sinus bradycardia:  Asymptomatic and hemodynamically stable.  She does have normal chronotropic competence, with exercise her heart rate up to 60s.  TSH was normal  Echocardiogram with hyperdynamic systolic function and grade 1 diastolic dysfunction, left atrial volume is slightly enlarged.  We will continue to monitor at this point.  Since that she is asymptomatic and has good chronotropic competence medication for pacemaker at this point.  Avoid any AV shar blocking agents  Optimal blood pressure control    She is being planned for heart monitor  to rule out A. fib in the setting of her CVA    2.  Hypertension:  On losartan I recommend starting amlodipine 5 mg as well.              Electronically signed by Elizabeth Poole MD, 03/09/21, 4:25 PM CST.

## 2021-03-09 NOTE — PROGRESS NOTES
[facility] Medicine Services  INPATIENT PROGRESS NOTE    Length of Stay: 2  Date of Admission: 3/7/2021  Primary Care Physician: Debra Wilkes MD    Subjective   Chief Complaint: No new complaints.    HPI: Patient is seen for follow-up.  She is a 75-year-old female with history of hypertension and hearing loss who was admitted for left pontine stroke with hypertensive urgency.    She is doing better, tolerating diet, less deconditioned and her speech/right-sided weakness continue to improve. She voices no new complaints.    Review of Systems   Constitutional: Positive for activity change and fatigue. Negative for appetite change, chills, diaphoresis and fever.   HENT: Positive for hearing loss. Negative for trouble swallowing and voice change.    Eyes: Negative for photophobia and visual disturbance.   Respiratory: Negative for cough, choking, chest tightness, shortness of breath, wheezing and stridor.    Cardiovascular: Negative for chest pain, palpitations and leg swelling.   Gastrointestinal: Negative for abdominal distention, abdominal pain, blood in stool, constipation, diarrhea, nausea and vomiting.   Endocrine: Negative for cold intolerance, heat intolerance, polydipsia, polyphagia and polyuria.   Genitourinary: Negative for decreased urine volume, difficulty urinating, dysuria, enuresis, flank pain, frequency, hematuria and urgency.   Musculoskeletal: Negative for arthralgias, gait problem, myalgias, neck pain and neck stiffness.   Skin: Negative for pallor, rash and wound.   Neurological: Positive for facial asymmetry, speech difficulty and weakness. Negative for dizziness, tremors, seizures, syncope, light-headedness, numbness and headaches.   Hematological: Does not bruise/bleed easily.   Psychiatric/Behavioral: Negative for agitation, behavioral problems and confusion.       Objective    Temp:  [96.7 °F (35.9 °C)-98.6 °F (37 °C)] 98.6 °F (37 °C)  Heart  Rate:  [42-63] 48  Resp:  [18] 18  BP: (141-177)/() 167/111    Physical Exam  Vitals and nursing note reviewed.   Constitutional:       General: She is not in acute distress.     Appearance: She is well-developed. She is not diaphoretic.   HENT:      Head: Normocephalic and atraumatic.      Comments: She is a little hard of hearing.     Right Ear: External ear normal.      Left Ear: External ear normal.      Nose: Nose normal.   Eyes:      Conjunctiva/sclera: Conjunctivae normal.      Pupils: Pupils are equal, round, and reactive to light.   Neck:      Thyroid: No thyromegaly.      Vascular: No JVD.   Cardiovascular:      Rate and Rhythm: Regular rhythm. Bradycardia present.      Heart sounds: Normal heart sounds. No murmur. No friction rub. No gallop.    Pulmonary:      Effort: Pulmonary effort is normal. No respiratory distress.      Breath sounds: Normal breath sounds. No stridor. No wheezing or rales.   Chest:      Chest wall: No tenderness.   Abdominal:      General: Bowel sounds are normal. There is no distension.      Palpations: Abdomen is soft. There is no mass.      Tenderness: There is no abdominal tenderness. There is no guarding or rebound.      Hernia: No hernia is present.   Musculoskeletal:         General: No swelling, tenderness or deformity. Normal range of motion.      Cervical back: Normal range of motion and neck supple.      Right lower leg: No edema.   Skin:     General: Skin is warm and dry.      Coloration: Skin is not jaundiced or pale.      Findings: No erythema or rash.   Neurological:      Mental Status: She is alert and oriented to person, place, and time.      Cranial Nerves: Facial asymmetry present.      Sensory: No sensory deficit.      Motor: Weakness present.      Coordination: Coordination normal.      Deep Tendon Reflexes: Reflexes are normal and symmetric.      Comments: She has mild right-sided hemiparesis.   Psychiatric:         Mood and Affect: Mood normal.          Behavior: Behavior normal. Behavior is cooperative.         Thought Content: Thought content normal.         Judgment: Judgment normal.         Medication Review:    Current Facility-Administered Medications:   •  acetaminophen (TYLENOL) tablet 650 mg, 650 mg, Oral, Q4H PRN **OR** acetaminophen (TYLENOL) 160 MG/5ML solution 650 mg, 650 mg, Oral, Q4H PRN **OR** acetaminophen (TYLENOL) suppository 650 mg, 650 mg, Rectal, Q4H PRN, Christopher Alvarez MD  •  aspirin tablet 325 mg, 325 mg, Oral, Daily, Bandar De Guzman APRN, 325 mg at 03/09/21 0945  •  atorvastatin (LIPITOR) tablet 80 mg, 80 mg, Oral, Daily, Christopher Alvarez MD, 80 mg at 03/09/21 0945  •  clopidogrel (PLAVIX) tablet 75 mg, 75 mg, Oral, Daily, Bandar De Guzman APRN, 75 mg at 03/09/21 0945  •  enalaprilat (VASOTEC) injection 1.25 mg, 1.25 mg, Intravenous, Q6H PRN, Christopher Alvarez MD, 1.25 mg at 03/08/21 1905  •  enoxaparin (LOVENOX) syringe 40 mg, 40 mg, Subcutaneous, Q24H, Christopher Alvarez MD, 40 mg at 03/08/21 1623  •  influenza vac split quad (FLUZONE,FLUARIX,AFLURIA,FLULAVAL) injection 0.5 mL, 0.5 mL, Intramuscular, During Hospitalization, Christopher Alvarez MD  •  losartan (COZAAR) tablet 50 mg, 50 mg, Oral, Q24H, Christopher Alvarez MD, 50 mg at 03/09/21 0945  •  niCARdipine (CARDENE) 25 mg in 250 mL NS infusion, 1-10 mg/hr, Intravenous, Titrated, Jean Tierney MD, Stopped at 03/08/21 1200  •  ondansetron (ZOFRAN) tablet 4 mg, 4 mg, Oral, Q6H PRN **OR** ondansetron (ZOFRAN) injection 4 mg, 4 mg, Intravenous, Q6H PRN, Christopher Alvarez MD  •  [START ON 3/10/2021] pantoprazole (PROTONIX) EC tablet 40 mg, 40 mg, Oral, Q AM, Christopher Alvarez MD  •  sodium chloride 0.9 % flush 10 mL, 10 mL, Intravenous, PRN, Christopher Alvarez MD  •  sodium chloride 0.9 % flush 10 mL, 10 mL, Intravenous, Q12H, Christopher Alvarez MD, 10 mL at 03/09/21 0945  •  sodium chloride 0.9 % flush 10 mL, 10 mL, Intravenous, PRN, Christopher Alvarez MD  •   sodium chloride 0.9 % infusion, 75 mL/hr, Intravenous, Continuous, Jean Tierney MD, Last Rate: 75 mL/hr at 03/09/21 0529, 75 mL/hr at 03/09/21 0529    Results Review:  I have reviewed the labs, radiology results, and diagnostic studies.    Laboratory Data:   Results from last 7 days   Lab Units 03/08/21 0311 03/07/21  0932 03/03/21  1215   SODIUM mmol/L 141 140  --    POTASSIUM mmol/L 3.8 4.2  --    CHLORIDE mmol/L 110* 106  --    CO2 mmol/L 24.0 27.0  --    BUN mg/dL 20 30* 36*   CREATININE mg/dL 0.79 0.84 0.99   GLUCOSE mg/dL 77 75  --    CALCIUM mg/dL 11.2* 11.9*  --    BILIRUBIN mg/dL  --  0.5  --    ALK PHOS U/L  --  58  --    ALT (SGPT) U/L  --  20  --    AST (SGOT) U/L  --  14  --    ANION GAP mmol/L 7.0 7.0  --      Estimated Creatinine Clearance: 58.2 mL/min (by C-G formula based on SCr of 0.79 mg/dL).          Results from last 7 days   Lab Units 03/08/21 0311 03/07/21  0932   WBC 10*3/mm3 9.24 11.18*   HEMOGLOBIN g/dL 14.3 14.1   HEMATOCRIT % 43.0 41.4   PLATELETS 10*3/mm3 237 278           Culture Data:   No results found for: BLOODCX  No results found for: URINECX  No results found for: RESPCX  No results found for: WOUNDCX  No results found for: STOOLCX  No components found for: BODYFLD    Radiology Data:   Imaging Results (Last 24 Hours)     ** No results found for the last 24 hours. **          I have reviewed the patient's current medications.     Assessment/Plan     Hospital Problem List:  Active Problems:    Cerebrovascular accident (CVA) (CMS/HCC)    Left pontine stroke (complicated by right-sided hemiparesis and dysarthria): Continue high intensity statin, DAPT with PT, OT and speech therapy.  CT angiogram of head and neck was unremarkable.  Input by neurologist is appreciated.  Transthoracic echocardiogram showed:  · Left ventricular wall thickness is consistent with mild to moderate concentric hypertrophy.  · Estimated left ventricular EF = 61% Left ventricular ejection fraction  appears to be 61 - 65%. Left ventricular systolic function is normal.  · Left ventricular diastolic function is consistent with (grade I) impaired relaxation.  · Left atrial volume is mildly increased.  · Saline test results are negative      Hypertensive urgency: Blood pressure has improved but remains uncontrolled and patient remains off Cardene infusion increase losartan to 75 mg daily and add hydralazine.  Continue to make adjustments to achieve optimal blood pressure control.     Possible sick sinus syndrome (with bradycardia): Heart rate continues to fluctuate but mostly stable.  Continue to monitor and consult cardiologist.  She may need permanent pacemaker placement if the need arises.      Continue GI and DVT prophylaxis.    Discharge Planning: In progress.    Christopher Alvarez MD   03/09/21   11:01 CST

## 2021-03-09 NOTE — PLAN OF CARE
Goal Outcome Evaluation:  Plan of Care Reviewed With: patient     Outcome Summary: OT p.m. tx completed. Pt instructed in 9 Hole Peg test for assessment of FMC. Pt completed with L hand in 41 seconds and completed with R hand in 1 min 11 sec. Pt demo decreased R hand FMC and in hand manipulation skills. Pt performed sit<>stand with CGA using RW (vc for hand placement) and completed SPT chair>EOB with min/CGA. Pt performed sit>supine with mod A. Pt left with all needs met and call bell in reach. Cont OT POC to progress towards goals.

## 2021-03-09 NOTE — PLAN OF CARE
Goal Outcome Evaluation:  Plan of Care Reviewed With: patient  Progress: improving  Outcome Summary: Pt. with bradycardia during PT treatment, no distress noted. Pt. transferred CGA sit-stand-sit amb. 4' F-B with RW CGA/Donna with ataxic gt., performed x20 reps seated therex in recliner

## 2021-03-09 NOTE — DISCHARGE PLACEMENT REQUEST
"Josi Roe (75 y.o. Female)     Date of Birth Social Security Number Address Home Phone MRN    1945  [address]  [facility] [address] [phone] [id number]    Zoroastrian Marital Status          Rastafari        Admission Date Admission Type Admitting Provider Attending Provider Department, Room/Bed    3/7/21 Emergency Christopher Alvarez MD Echendu, Anthony W, MD [facility] CRITICAL CARE STEPDOWN, 04/A    Discharge Date Discharge Disposition Discharge Destination                       Attending Provider: Christopher Alvarez MD    Allergies: Novocain [Procaine]    Isolation: None   Infection: None   Code Status: CPR    Ht: 154.9 cm (61\")   Wt: 75.6 kg (166 lb 10.7 oz)    Admission Cmt: None   Principal Problem: None                Active Insurance as of 3/7/2021     Primary Coverage     Payor Plan Insurance Group Employer/Plan Group    HUMANA MEDICARE REPLACEMENT HUMANA MEDICARE REPLACEMENT [id number]     Payor Plan Address Payor Plan Phone Number Payor Plan Fax Number Effective Dates    [address] [address] [phone]  8/1/2020 - None Entered    [facility] [id number]       Subscriber Name Subscriber Birth Date Member ID       JOSI ROE 1945 [id number]                 Emergency Contacts      (Rel.) Home Phone Work Phone Mobile Phone    SEAN HOLBROOK (Daughter) [phone] -- --            Nutrition Notes (last 24 hours) (Notes from 03/08/21 0954 through 03/09/21 0954)    No notes exist for this encounter.            Physical Therapy Notes (last 48 hours) (Notes from 03/07/21 0954 through 03/09/21 0954)      Mounika Anand, PT at 03/07/21 1531  Version 1 of 1 03/07/21 1530   OTHER   Discipline occupational therapist;physical therapist   Rehab Time/Intention   Session Not Performed unable to evaluate, medical status change  (Therapy to check w/ neuro 3.8.21 before attempting eval)   Recommendation   PT - Next " Appointment 03/08/21   Recommendation   OT - Next Appointment 03/08/21       Electronically signed by Mounika Anand PT at 03/07/21 1531     Mounika Anand PT at 03/08/21 1301  Version 2 of 2       Goal Outcome Evaluation:  Plan of Care Reviewed With: patient  Progress: no change  Outcome Summary: PT eval completed at bedside. Pt previously indep in home and community mobility & ADLs. Dtr local but not close by but can stay w/ her some of the time if d/c home per pt. Pt able to move w/out obligation to abnormal tone but w/ hx of muna club feet she has some unsteadiness noted in gait that could be premorbid as well as CVA/TIA  related. She moved all 4 E'x w/out abnormal tone. Sit<>Stand, gait w/ supervision/CGA 1 for stabilty as needed. She used FWRW but did not keep it close to feet as requested, pushing it at least 1 fot ahead of her putting her in full fwd flx. She was cued 3 times to keep walker closer than out at arms length.Her base of support is limited due to  hx club foot and feet very small which may affect gait.  She reports yesterday her R side was weaker and this is noted but not specifically weaker in MMT.  She can benefit from skilled services to prep for living alone at home as mobility can be fall risk for her.  Recommend home w/ 24/7 care and follow up for therapy or IRF to prepare her for living alone post CVA/TIA    Electronically signed by Mounika Anand PT at 03/08/21 1355     Mounika Anand PT at 03/08/21 1301  Version 1 of 2       Goal Outcome Evaluation:  Plan of Care Reviewed With: patient  Progress: no change  Outcome Summary: PT eval completed at bedside. Pt previously indep in home and community mobility & ADLs. Dtr local but not close by but can stay w/ her some of the time if d/c home per pt. Pt able to move w/out obligation to abnormal tone but w/ hx of muna club feet she has some unsteadiness noted in gait that could be premorbid as well  as CVA/TIA  related. She moved all 4 E'x w/out abnormal tone. Sit<>Stand, gait w/ supervision/CGA 1 for stabilty as needed. She used FWRW but did not keep it close to feet as requested, pushing it at least 1 fot ahead of her putting her in full fwd flx. She was cued 3 times to keep walker closer than out at arms length. She has hx club foot sx muna as child and feet very small so her base of support may be a functional issue as well more than the TIA/CVA, difficutly  them out. She reports yesterday her R side was weaker and this is noted. Recommend home w care and follow up for therapy or IRF to prepare her for living alone post CVA/TIA    Electronically signed by Mounika Anand, PT at 21 1301     Mounika Anand, PT at 21 0067  Version 1 of 1         Patient Name: Josi Roe  : 1945    MRN: [id number]                              Today's Date: 3/8/2021     PT Eval   Admit Date: 3/7/2021    Visit Dx:     ICD-10-CM ICD-9-CM   1. Cerebrovascular accident (CVA), unspecified mechanism (CMS/HCC)  I63.9 434.91   2. Hypertensive emergency  I16.1 401.9   3. Impaired mobility and ADLs  Z74.09 V49.89    Z78.9    4. Impaired functional mobility, balance, gait, and endurance  Z74.09 V49.89     Patient Active Problem List   Diagnosis   • Cerebrovascular accident (CVA) (CMS/HCC)     Past Medical History:   Diagnosis Date   • Heart murmur    • Hypertension      Past Surgical History:   Procedure Laterality Date   • CHOLECYSTECTOMY     • CLUB FOOT RELEASE     • HYSTERECTOMY       General Information     Row Name 21 1111          Physical Therapy Time and Intention    Document Type  evaluation  -GB     Mode of Treatment  individual therapy;physical therapy  -GB     Row Name 21 1111          General Information    Patient Profile Reviewed  yes  -GB     Prior Level of Function  independent:;ADL's;bathing;dressing;cooking;cleaning;shopping  -GB     Existing  Precautions/Restrictions  fall  -     Row Name 03/08/21 1111          Living Environment    Lives With  alone  -     Row Name 03/08/21 1111          Home Main Entrance    Number of Stairs, Main Entrance  none  -     Row Name 03/08/21 1111          Stairs Within Home, Primary    Number of Stairs, Within Home, Primary  none  -     Row Name 03/08/21 1111          Cognition    Orientation Status (Cognition)  oriented x 4  -     Row Name 03/08/21 1111          Safety Issues, Functional Mobility    Safety Issues Affecting Function (Mobility)  awareness of need for assistance;safety precaution awareness  -     Impairments Affecting Function (Mobility)  endurance/activity tolerance;balance;coordination  -     Comment, Safety Issues/Impairments (Mobility)  lets walker go too far ahead and picks it up for turns, leaves it aside away from chair befoer she backs up to the chair  -       User Key  (r) = Recorded By, (t) = Taken By, (c) = Cosigned By    Initials Name Provider Type    Mounika Rangel PT Physical Therapist        Mobility     Row Name 03/08/21 1156          Bed Mobility    Supine-Sit [place] (Bed Mobility)  not tested  -     Row Name 03/08/21 1156          Sit-Stand Transfer    Sit-Stand [place] (Transfers)  independent;supervision  -     Row Name 03/08/21 1156          Gait/Stairs (Locomotion)    [place] Level (Gait)  contact guard;1 person assist;nonverbal cues (demo/gesture);verbal cues  -     Assistive Device (Gait)  walker, front-wheeled  -     Distance in Feet (Gait)  66 ft needing cuing to keep walker close and functional in mobility. She uses walker occassionally at home  -       User Key  (r) = Recorded By, (t) = Taken By, (c) = Cosigned By    Initials Name Provider Type    Mounika Rangel PT Physical Therapist        Obj/Interventions     Row Name 03/08/21 1126          Range of Motion Comprehensive    General Range of Motion  bilateral  lower extremity ROM WFL  -GB     Comment, General Range of Motion  DF limited to neutral muna; states she had muna club feet & [facility] 2 [facility]. She denies falls often; walkes indep w/out AD most of time  -     Row Name 03/08/21 1126          Strength Comprehensive (MMT)    Comment, General Manual Muscle Testing (MMT) Assessment  muna LEs rodrigo resistance at 4- to 4/5, R weaker than L. She has significant crepitus muna LEs L>R at knees and feet so resistance was guarded to avoid joint injury; states she doesn't have much pain there usually  -     Row Name 03/08/21 1126          Balance    Balance Assessment  sitting static balance;sitting dynamic balance  -GB     Static Sitting Balance  WFL  -GB     Dynamic Sitting Balance  WFL  -GB     Static Standing Balance  mild impairment  -     Dynamic Standing Balance  mild impairment  -     Row Name 03/08/21 1126          Sensory Assessment (Somatosensory)    Sensory Assessment (Somatosensory)  sensation intact;LE sensation intact  -       User Key  (r) = Recorded By, (t) = Taken By, (c) = Cosigned By    Initials Name Provider Type    Mounika Rangel, PT Physical Therapist        Goals/Plan     Row Name 03/08/21 1130          Bed Mobility Goal 1 (PT)    Activity/Assistive Device (Bed Mobility Goal 1, PT)  bed mobility activities, all  -GB     [place] Level/Cues Needed (Bed Mobility Goal 1, PT)  independent;modified independence  -GB     Time Frame (Bed Mobility Goal 1, PT)  by discharge  -GB     Progress/Outcomes (Bed Mobility Goal 1, PT)  goal not met  -     Row Name 03/08/21 1130          Transfer Goal 1 (PT)    Activity/Assistive Device (Transfer Goal 1, PT)  bed-to-chair/chair-to-bed  -GB     [place] Level/Cues Needed (Transfer Goal 1, PT)  independent;modified independence  -GB     Time Frame (Transfer Goal 1, PT)  by discharge  -GB     Progress/Outcome (Transfer Goal 1, PT)  goal not met  -     Row Name  03/08/21 1130          Gait Training Goal 1 (PT)    Activity/Assistive Device (Gait Training Goal 1, PT)  gait (walking locomotion);assistive device use  -GB     [place] Level (Gait Training Goal 1, PT)  modified independence  -GB     Distance (Gait Training Goal 1, PT)  150 ft or more  -GB     Time Frame (Gait Training Goal 1, PT)  by discharge  -GB     Row Name 03/08/21 1130          Stairs Goal 1 (PT)    Activity/Assistive Device (Stairs Goal 1, PT)  ascending stairs;descending stairs  -GB     [place] Level/Cues Needed (Stairs Goal 1, PT)  modified independence;independent  -GB     Number of Stairs (Stairs Goal 1, PT)  2 or more  -GB     Time Frame (Stairs Goal 1, PT)  by discharge  -GB     Progress/Outcome (Stairs Goal 1, PT)  goal not met  -GB     Row Name 03/08/21 1130          Patient Education Goal (PT)    Activity (Patient Education Goal, PT)  pt will be skilled w/ use of FWRW for safety  -GB     [place]/Cues/Accuracy (Memory Goal 2, PT)  demonstrates adequately  -GB     Time Frame (Patient Education Goal, PT)  by discharge  -GB     Progress/Outcome (Patient Education Goal, PT)  goal not met  -GB       User Key  (r) = Recorded By, (t) = Taken By, (c) = Cosigned By    Initials Name Provider Type    Mounika Rangel, PT Physical Therapist        Clinical Impression     Row Name 03/08/21 1114          Pain Scale: Numbers Pre/Post-Treatment    Pretreatment Pain Rating  0/10 - no pain  -GB     Posttreatment Pain Rating  0/10 - no pain  -GB     Row Name 03/08/21 1114          Plan of Care Review    Plan of Care Reviewed With  patient  -GB     Progress  no change  -GB     Outcome Summary  PT eval completed at bedside. Pt previously indep in home and community mobility & ADLs. Dtr local but not close by but can stay w/ her some of the time if d/c home per pt. Pt able to move w/out obligation to abnormal tone but w/ hx of muna club feet she has some unsteadiness noted in gait that could be  premorbid as well as CVA/TIA  related. She moved all 4 E'x w/out abnormal tone. Sit<>Stand, gait w/ supervision/CGA 1 for stabilty as needed. She used FWRW but did not keep it close to feet as requested, pushing it at least 1 fot ahead of her putting her in full fwd flx. She was cued 3 times to keep walker closer than out at arms length. She has hx club foot sx muna as child and feet very small so her base of support may be a functional issue as well for gait. She reports yesterday her R side was weaker and this is noted at 4-/5.Because she prefers living alone and does not have dtr close by, may benefit from IRF prior to home or rehab to home program. She is asked to consider her duties at home and possible hazards so we may address them while here. If home, rec 24/7 care and follow up therapy either HH or outpt what ever best for pt.  -GB     Row Name 03/08/21 1114          Therapy Assessment/Plan (PT)    Patient/Family Therapy Goals Statement (PT)  home alone; dtr lives in adjacent area but not close enough to walk,  -GB     Rehab Potential (PT)  good, to achieve stated therapy goals  -GB     Criteria for Skilled Interventions Met (PT)  yes  -GB     Predicted Duration of Therapy Intervention (PT)  2-4 d inpt, 3-5 wks outpt prn  -GB     Row Name 03/08/21 1114          Vital Signs    Pre Systolic BP Rehab  150  -GB     Pre Treatment Diastolic BP  72  -GB     Intra Systolic BP Rehab  180  -GB     Intra Treatment Diastolic BP  83  -GB     Post Systolic BP Rehab  157  -GB     Post Treatment Diastolic BP  69  -GB     Pretreatment Heart Rate (beats/min)  70  -GB     Posttreatment Heart Rate (beats/min)  57  -GB     Pre SpO2 (%)  99  -GB     O2 Delivery Pre Treatment  room air  -GB     Post SpO2 (%)  99  -GB     O2 Delivery Post Treatment  room air  -GB     Pre Patient Position  Sitting  -GB     Intra Patient Position  Standing  -GB     Post Patient Position  Sitting  -GB     Row Name 03/08/21 1114          Positioning  and Restraints    Pre-Treatment Position  sitting in chair/recliner  -GB     Post Treatment Position  chair  -GB     In Chair  call light within reach;encouraged to call for assist;reclined;sitting  -VASHTI       User Key  (r) = Recorded By, (t) = Taken By, (c) = Cosigned By    Initials Name Provider Type    Mounika Rangel, PT Physical Therapist        Outcome Measures     Row Name 03/08/21 1257          How much help from another person do you currently need...    Turning from your back to your side while in flat bed without using bedrails?  3  -GB     Moving from lying on back to sitting on the side of a flat bed without bedrails?  3  -GB     Moving to and from a bed to a chair (including a wheelchair)?  3  -GB     Standing up from a chair using your arms (e.g., wheelchair, bedside chair)?  3  -GB     Climbing 3-5 steps with a railing?  3  -GB     To walk in hospital room?  3  -GB     AM-PAC 6 Clicks Score (PT)  18  -     Row Name 03/08/21 1257          Modified [place] Scale    Pre-Stroke Modified Edwin Scale  0 - No Symptoms at all.  -GB     Modified [place] Scale  4 - Moderately severe disability.  Unable to walk without assistance, and unable to attend to own bodily needs without assistance.  -     Row Name 03/08/21 1257          Functional Assessment    Outcome Measure Options  Modified [place];AM-PAC 6 Clicks Basic Mobility (PT)  -       User Key  (r) = Recorded By, (t) = Taken By, (c) = Cosigned By    Initials Name Provider Type    Mounika Rangel PT Physical Therapist        Physical Therapy Education                 Title: PT OT SLP Therapies (In Progress)     Topic: Physical Therapy (Done)     Point: Mobility training (Done)     Learning Progress Summary           Patient Acceptance, D,E, VU,NR by VASHTI at 3/8/2021 6816    Comment: POC, need for more control in use of FWRW, rec assist at home if d/c home; may benefit from skilled care before home                   Point: Home  exercise program (Done)     Learning Progress Summary           Patient Acceptance, D,E, VU,NR by VASHTI at 3/8/2021 1258    Comment: POC, need for more control in use of FWRW, rec assist at home if d/c home; may benefit from skilled care before home                   Point: Body mechanics (Done)     Learning Progress Summary           Patient Acceptance, D,E, VU,NR by VASHTI at 3/8/2021 1258    Comment: POC, need for more control in use of FWRW, rec assist at home if d/c home; may benefit from skilled care before home                   Point: Precautions (Done)     Learning Progress Summary           Patient Acceptance, D,E, VU,NR by VASHTI at 3/8/2021 1258    Comment: POC, need for more control in use of FWRW, rec assist at home if d/c home; may benefit from skilled care before home                               User Key     Initials Effective Dates Name Provider Type Discipline     04/03/18 -  Mounika Anand, PT Physical Therapist PT              PT Recommendation and Plan  Planned Therapy Interventions (PT): bed mobility training, balance training, neuromuscular re-education, motor coordination training, home exercise program, gait training, transfer training, strengthening, stair training  Plan of Care Reviewed With: patient  Progress: no change  Outcome Summary: PT eval completed at bedside. Pt previously indep in home and community mobility & ADLs. Dtr local but not close by but can stay w/ her some of the time if d/c home per pt. Pt able to move w/out obligation to abnormal tone but w/ hx of muna club feet she has some unsteadiness noted in gait that could be premorbid as well as CVA/TIA  related. She moved all 4 E'x w/out abnormal tone. Sit<>Stand, gait w/ supervision/CGA 1 for stabilty as needed. She used FWRW but did not keep it close to feet as requested, pushing it at least 1 fot ahead of her putting her in full fwd flx. She was cued 3 times to keep walker closer than out at arms length. She has hx club  foot sx muna as child and feet very small so her base of support may be a functional issue as well for gait. She reports yesterday her R side was weaker and this is noted at 4-/5.Because she prefers living alone and does not have dtr close by, may benefit from IRF prior to home or rehab to home program. She is asked to consider her duties at home and possible hazards so we may address them while here. If home, rec  care and follow up therapy either HH or outpt what ever best for pt.     Time Calculation:   PT Charges     Row Name 21 1229             Time Calculation    Start Time  1111  -GB      Stop Time  1204  -GB      Time Calculation (min)  53 min  -GB      PT Received On  21  -GB      PT Goal Re-Cert Due Date  21  -        User Key  (r) = Recorded By, (t) = Taken By, (c) = Cosigned By    Initials Name Provider Type    Mounika Rangel, PT Physical Therapist        Therapy Charges for Today     Code Description Service Date Service Provider Modifiers Qty    07067253887 HC PT EVAL MOD COMPLEXITY 4 3/8/2021 Mounika Anand, PT GP 1          PT G-Codes  Outcome Measure Options: Modified [place], AM-PAC 6 Clicks Basic Mobility (PT)  AM-PAC 6 Clicks Score (PT): 18  AM-PAC 6 Clicks Score (OT): 18  Modified [place] Scale: 4 - Moderately severe disability.  Unable to walk without assistance, and unable to attend to own bodily needs without assistance.    Mounika Anand PT  3/8/2021      Electronically signed by Mounika Anand PT at 21 1408          Occupational Therapy Notes (last 48 hours) (Notes from 21 0954 through 21 0954)      Lucy Fernandes, OT at 21 0913          Patient Name: Josi Roe  : 1945    MRN: [id number]                              Today's Date: 3/9/2021       Admit Date: 3/7/2021    Visit Dx:     ICD-10-CM ICD-9-CM   1. Cerebrovascular accident (CVA) due to thrombosis of cerebral artery (CMS/HCC)   I63.30 434.01   2. Cerebrovascular accident (CVA), unspecified mechanism (CMS/[facility])  I63.9 434.91   3. Hypertensive emergency  I16.1 401.9   4. Impaired mobility and ADLs  Z74.09 V49.89    Z78.9    5. Impaired functional mobility, balance, gait, and endurance  Z74.09 V49.89   6. Other hereditary cerebrovascular disease   I67.858 437.8     Patient Active Problem List   Diagnosis   • Cerebrovascular accident (CVA) (CMS/[facility])     Past Medical History:   Diagnosis Date   • Heart murmur    • Hypertension      Past Surgical History:   Procedure Laterality Date   • CHOLECYSTECTOMY     • CLUB FOOT RELEASE     • HYSTERECTOMY       General Information     Row Name 03/09/21 0855          OT Time and Intention    Document Type  therapy note (daily note)  -AS     Mode of Treatment  individual therapy;occupational therapy  -AS     Row Name 03/09/21 0855          General Information    Patient Profile Reviewed  yes  -AS     Row Name 03/09/21 0855          Safety Issues, Functional Mobility    Safety Issues Affecting Function (Mobility)  awareness of need for assistance;judgment;problem-solving;safety precautions follow-through/compliance;safety precaution awareness;sequencing abilities  -AS     Impairments Affecting Function (Mobility)  endurance/activity tolerance;balance;coordination  -AS     Comment, Safety Issues/Impairments (Mobility)  lacks initiation  -AS       User Key  (r) = Recorded By, (t) = Taken By, (c) = Cosigned By    Initials Name Provider Type    AS Lucy Fernandes OT Occupational Therapist          Mobility/ADL's     Row Name 03/09/21 0855          Bed Mobility    Comment (Bed Mobility)  Pt rec'd seated in chair  -AS     Row Name 03/09/21 0855          Transfers    Transfers  sit-stand transfer  -AS     Comment (Transfers)  sit<>stand x2; 1 LOB with retropulsion back to chair  -AS     Sit-Stand [place] (Transfers)  standby assist  -AS     Row Name 03/09/21 0855          Sit-Stand Transfer    Assistive  "Device (Sit-Stand Transfers)  walker, front-wheeled  -AS     Row Name 03/09/21 0855          Activities of Daily Living    BADL Assessment/Intervention  bathing;lower body dressing;upper body dressing;grooming;toileting  -AS     Row Name 03/09/21 0855          Lower Body Dressing Assessment/Training    [place] Level (Lower Body Dressing)  don;pants/bottoms;minimum assist (75% patient effort)  -AS     Position (Lower Body Dressing)  unsupported sitting;supported standing  -AS     Row Name 03/09/21 0855          Upper Body Dressing Assessment/Training    [place] Level (Upper Body Dressing)  don;other (see comments);minimum assist (75% patient effort) hospital gown  -AS     Position (Upper Body Dressing)  supported sitting  -AS     Row Name 03/09/21 0855          Bathing Assessment/Intervention    [place] Level (Bathing)  upper body;verbal cues;set up;lower body;minimum assist (75% patient effort)  -AS     Position (Bathing)  unsupported sitting;supported standing  -AS     Comment (Bathing)  vc for initation and sequencing; min A for thorough wasing of ángel area and buttocks  -AS     Row Name 03/09/21 0855          Grooming Assessment/Training    [place] Level (Grooming)  wash face, hands;hair care, combing/brushing;oral care regimen  -AS     Position (Grooming)  unsupported sitting  -AS     Row Name 03/09/21 0855          Toileting Assessment/Training    Comment (Toileting)  pt found seated in chair with pure whick; pt linens and chair soiled. OT asked pt if she knew chair was soiled, pt stated \"yes\". OT asked why pt did ot contact nursing and pt stated \" I don't know\"  -AS       User Key  (r) = Recorded By, (t) = Taken By, (c) = Cosigned By    Initials Name Provider Type    AS Lucy Fernandes OT Occupational Therapist        Obj/Interventions    No documentation.       Goals/Plan     Row Name 03/09/21 0850          Transfer Goal 1 (OT)    Activity/Assistive Device (Transfer Goal 1, OT)  toilet  " -AS     [place] Level/Cues Needed (Transfer Goal 1, OT)  supervision required  -AS     Time Frame (Transfer Goal 1, OT)  long term goal (LTG);by discharge  -AS     Progress/Outcome (Transfer Goal 1, OT)  goal not met  -AS     Row Name 03/09/21 0850          Bathing Goal 1 (OT)    Activity/Device (Bathing Goal 1, OT)  lower body bathing AD as needed  -AS     [place] Level/Cues Needed (Bathing Goal 1, OT)  supervision required  -AS     Time Frame (Bathing Goal 1, OT)  long term goal (LTG);by discharge  -AS     Progress/Outcomes (Bathing Goal 1, OT)  goal not met  -AS     Row Name 03/09/21 0850          Dressing Goal 1 (OT)    Activity/Device (Dressing Goal 1, OT)  lower body dressing AD as needed  -AS     [place]/Cues Needed (Dressing Goal 1, OT)  supervision required  -AS     Time Frame (Dressing Goal 1, OT)  long term goal (LTG);by discharge  -AS     Progress/Outcome (Dressing Goal 1, OT)  goal not met  -AS       User Key  (r) = Recorded By, (t) = Taken By, (c) = Cosigned By    Initials Name Provider Type    AS Lucy Fernandes, OT Occupational Therapist        Clinical Impression     Row Name 03/09/21 0850          Pain Assessment    Additional Documentation  Pain Scale: Numbers Pre/Post-Treatment (Group)  -AS     Row Name 03/09/21 0850          Pain Scale: Numbers Pre/Post-Treatment    Pretreatment Pain Rating  0/10 - no pain  -AS     Posttreatment Pain Rating  0/10 - no pain  -AS     Row Name 03/09/21 0850          Plan of Care Review    Plan of Care Reviewed With  patient  -AS     Row Name 03/09/21 0850          Vital Signs    Pre Systolic BP Rehab  174  -AS     Pre Treatment Diastolic BP  77  -AS     Post Systolic BP Rehab  149  -AS     Post Treatment Diastolic BP  67  -AS     Pretreatment Heart Rate (beats/min)  45  -AS     Posttreatment Heart Rate (beats/min)  50  -AS     Pre SpO2 (%)  100  -AS     O2 Delivery Pre Treatment  room air  -AS     Post SpO2 (%)  95  -AS     O2 Delivery Post  Treatment  room air  -AS     Pre Patient Position  Sitting  -AS     Post Patient Position  Sitting  -AS     Row Name 03/09/21 0850          Positioning and Restraints    Pre-Treatment Position  sitting in chair/recliner  -AS     Post Treatment Position  chair  -AS     In Chair  notified nsg;reclined;call light within reach;encouraged to call for assist  -AS       User Key  (r) = Recorded By, (t) = Taken By, (c) = Cosigned By    Initials Name Provider Type    AS Lucy Fernandes OT Occupational Therapist        Outcome Measures     Row Name 03/09/21 0850          How much help from another is currently needed...    Putting on and taking off regular lower body clothing?  3  -AS     Bathing (including washing, rinsing, and drying)  3  -AS     Toileting (which includes using toilet bed pan or urinal)  2  -AS     Putting on and taking off regular upper body clothing  3  -AS     Taking care of personal grooming (such as brushing teeth)  3  -AS     Eating meals  4  -AS     AM-PAC 6 Clicks Score (OT)  18  -AS     Row Name 03/09/21 0850          Functional Assessment    Outcome Measure Options  AM-PAC 6 Clicks Daily Activity (OT)  -AS       User Key  (r) = Recorded By, (t) = Taken By, (c) = Cosigned By    Initials Name Provider Type    AS Lucy Fernandes OT Occupational Therapist        Occupational Therapy Education                 Title: PT OT SLP Therapies (In Progress)     Topic: Occupational Therapy (In Progress)     Point: ADL training (Done)     Description:   Instruct learner(s) on proper safety adaptation and remediation techniques during self care or transfers.   Instruct in proper use of assistive devices.              Learning Progress Summary           Patient Acceptance, E, VU by AS at 3/9/2021 0933    Comment: ADL training, transfer training, safety edu                   Point: Home exercise program (Not Started)     Description:   Instruct learner(s) on appropriate technique for monitoring, assisting  "and/or progressing therapeutic exercises/activities.              Learner Progress:  Not documented in this visit.          Point: Precautions (Done)     Description:   Instruct learner(s) on prescribed precautions during self-care and functional transfers.              Learning Progress Summary           Patient Acceptance, E, VU by AS at 3/9/2021 0933    Comment: ADL training, transfer training, safety edu    Acceptance, E, VU by ME at 3/8/2021 1114    Comment: Educated on OT and POC. Educated to call for assistance. Educated on safety precautions. Educated on discharge recommendations.                   Point: Body mechanics (Done)     Description:   Instruct learner(s) on proper positioning and spine alignment during self-care, functional mobility activities and/or exercises.              Learning Progress Summary           Patient Acceptance, E, VU by ME at 3/8/2021 1114    Comment: Educated on OT and POC. Educated to call for assistance. Educated on safety precautions. Educated on discharge recommendations.                               User Key     Initials Effective Dates Name Provider Type Discipline    AS 07/05/20 -  Lucy Fernandes OT Occupational Therapist OT    ME 08/24/20 -  Latonia See OTR/L Occupational Therapist OT              OT Recommendation and Plan     Plan of Care Review  Plan of Care Reviewed With: patient  Outcome Summary: OT tx completed. Pt agreeable and cooperative, flat affect. Pt performed ADL tasks while up seated in chair. Pt performed UBB with s/u, LBB with min A (requires assist thoroughly washing ángel areas), LBD with min A, and grooming tasks with s/u. Pt performed sit<>stand from chair x2 with SBA. Pt had 1 LOB back to chair.  Pt demo overall decreased initation and requires vc for sequencing and problem solving. Pt found to be in soiled chair. When asked if she knew chair/pads where soiled, pt stated \"yes.\" OT asked why pt did not notify nurse and pt stated \" I don't " "know.\" No goals met on this date, cont OT POC. If pt returns home, will need 24/7 assist.     Time Calculation:   Time Calculation- OT     Row Name 03/09/21 0943             Time Calculation- OT    OT Start Time  0850  -AS      OT Stop Time  0943  -AS      OT Time Calculation (min)  53 min  -AS      OT Received On  03/09/21  -AS        User Key  (r) = Recorded By, (t) = Taken By, (c) = Cosigned By    Initials Name Provider Type    AS Lucy Fernandes OT Occupational Therapist        Therapy Charges for Today     Code Description Service Date Service Provider Modifiers Qty    96201423942 HC OT SELF CARE/MGMT/TRAIN EA 15 MIN 3/9/2021 Lucy Fernandes OT GO 4               Lucy Fernandes OT  3/9/2021    Electronically signed by Lucy Fernandes OT at 03/09/21 0944     Lucy Fernandes OT at 03/09/21 0938        Goal Outcome Evaluation:  Plan of Care Reviewed With: patient     Outcome Summary: OT tx completed. Pt agreeable and cooperative, flat affect. Pt performed ADL tasks while up seated in chair. Pt performed UBB with s/u, LBB with min A (requires assist thoroughly washing ángel areas), LBD with min A, and grooming tasks with s/u. Pt performed sit<>stand from chair x2 with SBA. Pt had 1 LOB back to chair.  Pt demo overall decreased initation and requires vc for sequencing and problem solving. Pt found to be in soiled chair. When asked if she knew chair/pads where soiled, pt stated \"yes.\" OT asked why pt did not notify nurse and pt stated \" I don't know.\" No goals met on this date, cont OT POC. If pt returns home, will need 24/7 assist.    Electronically signed by Lucy Fernandes OT at 03/09/21 0938     Latonia See OTR/L at 03/08/21 1333             03/08/21 1332   OTHER   Discipline occupational therapist   Rehab Time/Intention   Session Not Performed patient/family declined treatment   OT treatment attempted this afternoon. Pt requesting to sleep this afternoon as she is very fatigued. Will follow up " tomorrow.     Electronically signed by Latonia See OTR/L at 21 1333     Latonia See OTR/L at 21 1115          Patient Name: Josi Roe  : 1945    MRN: [id number]                              Today's Date: 3/8/2021       Admit Date: 3/7/2021    Visit Dx:     ICD-10-CM ICD-9-CM   1. Cerebrovascular accident (CVA), unspecified mechanism (CMS/HCC)  I63.9 434.91   2. Hypertensive emergency  I16.1 401.9   3. Impaired mobility and ADLs  Z74.09 V49.89    Z78.9      Patient Active Problem List   Diagnosis   • Cerebrovascular accident (CVA) (CMS/[facility])     Past Medical History:   Diagnosis Date   • Heart murmur    • Hypertension      Past Surgical History:   Procedure Laterality Date   • CHOLECYSTECTOMY     • CLUB FOOT RELEASE     • HYSTERECTOMY       General Information     Row Name 21          OT Time and Intention    Document Type  evaluation  -ME     Mode of Treatment  individual therapy;occupational therapy  -ME     Row Name 21 09          General Information    Patient Profile Reviewed  yes  -ME     Prior Level of Function  independent:;all household mobility;community mobility;ADL's;shopping;driving  -ME     Existing Precautions/Restrictions  fall  -ME     Row Name 21 0959          Living Environment    Lives With  alone  -ME     Row Name 21 09          Home Main Entrance    Number of Stairs, Main Entrance  none  -ME     Row Name 2159          Stairs Within Home, Primary    Stairs, Within Home, Primary  has a FWW but was not using yet; has a tub shower combo with no shower chair;  -ME     Number of Stairs, Within Home, Primary  none  -ME     Row Name 21 09          Cognition    Orientation Status (Cognition)  oriented x 4  -ME     Row Name 21          Safety Issues, Functional Mobility    Safety Issues Affecting Function (Mobility)  at risk behavior observed;awareness of need for assistance;insight into  deficits/self-awareness;safety precautions follow-through/compliance;safety precaution awareness  -ME     Impairments Affecting Function (Mobility)  balance;endurance/activity tolerance;strength  -ME       User Key  (r) = Recorded By, (t) = Taken By, (c) = Cosigned By    Initials Name Provider Type    ME Latonia See OTR/L Occupational Therapist          Mobility/ADL's     Row Name 03/08/21 0959          Bed Mobility    Bed Mobility  supine-sit  -ME     Supine-Sit [place] (Bed Mobility)  supervision  -ME     Assistive Device (Bed Mobility)  head of bed elevated;bed rails  -ME     Comment (Bed Mobility)  increased time and verbal cues required  -ME     Row Name 03/08/21 0959          Transfers    Transfers  sit-stand transfer;bed-chair transfer  -ME     Comment (Transfers)  sit to stand x 2 at EOB; vc's for hand placement with transfers; recommend use of RW as pt was reaching for OT and for furniture  -ME     Bed-Chair [place] (Transfers)  minimum assist (75% patient effort)  -ME     Assistive Device (Bed-Chair Transfers)  other (see comments) hand held and none  -ME     Sit-Stand [place] (Transfers)  minimum assist (75% patient effort)  -ME     Row Name 03/08/21 0959          Sit-Stand Transfer    Assistive Device (Sit-Stand Transfers)  other (see comments) hand held and none  -ME     Row Name 03/08/21 0959          Functional Mobility    Functional Mobility- Ind. Level  minimum assist (75% patient effort)  -ME     Functional Mobility- Device  other (see comments) hand held and none  -ME     Functional Mobility- Comment  educated pt on need for RW use as pt was reaching for furniture and for OT with mobility; ambulated at bedside only; poor balance upon initial stand but seemed to improve with standing longer  -ME     Row Name 03/08/21 0959          Activities of Daily Living    BADL Assessment/Intervention  lower body dressing;upper body dressing  -ME     Row Name 03/08/21 0959          Lower Body  Dressing Assessment/Training    [place] (Lower Body Dressing)  don;socks;dependent (less than 25% patient effort)  -ME     Position (Lower Body Dressing)  sitting up in bed  -ME     Row Name 03/08/21 0959          Upper Body Dressing Assessment/Training    [place] (Upper Body Dressing)  doff;don;minimum assist (75% patient effort) hospital gown  -ME     Position (Upper Body Dressing)  edge of bed sitting  -ME       User Key  (r) = Recorded By, (t) = Taken By, (c) = Cosigned By    Initials Name Provider Type    ME Latonia See OTR/L Occupational Therapist        Obj/Interventions     Row Name 03/08/21 0959          Sensory Assessment (Somatosensory)    Sensory Assessment (Somatosensory)  UE sensation intact  -ME     Row Name 03/08/21 0959          Range of Motion Comprehensive    General Range of Motion  no range of motion deficits identified;bilateral upper extremity ROM WFL  -ME     Row Name 03/08/21 0959          Strength Comprehensive (MMT)    General Manual Muscle Testing (MMT) Assessment  other (see comments)  -ME     Comment, General Manual Muscle Testing (MMT) Assessment  BUE strength and bilateral  strength are 4- to 4/5; equal on both sides; pt is right handed; no coordination deficits observed with nose finger nose coordination testing  -ME     Row Name 03/08/21 0959          Balance    Balance Assessment  sitting static balance;sitting dynamic balance;standing static balance;standing dynamic balance  -ME     Static Sitting Balance  WFL  -ME     Dynamic Sitting Balance  WFL  -ME     Static Standing Balance  mild impairment  -ME     Dynamic Standing Balance  mild impairment;moderate impairment  -ME       User Key  (r) = Recorded By, (t) = Taken By, (c) = Cosigned By    Initials Name Provider Type    Latonia Barnes OTR/L Occupational Therapist        Goals/Plan     Row Name 03/08/21 0959          Transfer Goal 1 (OT)    Activity/Assistive Device (Transfer Goal 1, OT)  toilet   -ME     [place] Level/Cues Needed (Transfer Goal 1, OT)  supervision required  -ME     Time Frame (Transfer Goal 1, OT)  long term goal (LTG);by discharge  -ME     Progress/Outcome (Transfer Goal 1, OT)  goal not met  -ME     Row Name 03/08/21 0959          Bathing Goal 1 (OT)    Activity/Device (Bathing Goal 1, OT)  lower body bathing AD as needed  -ME     [place] Level/Cues Needed (Bathing Goal 1, OT)  supervision required  -ME     Time Frame (Bathing Goal 1, OT)  long term goal (LTG);by discharge  -ME     Progress/Outcomes (Bathing Goal 1, OT)  goal not met  -ME     [facility] Name 03/08/21 0959          Dressing Goal 1 (OT)    Activity/Device (Dressing Goal 1, OT)  lower body dressing AD as needed  -ME     [place]/Cues Needed (Dressing Goal 1, OT)  supervision required  -ME     Time Frame (Dressing Goal 1, OT)  long term goal (LTG);by discharge  -ME     Progress/Outcome (Dressing Goal 1, OT)  goal not met  -ME     Row Name 03/08/21 0959          Therapy Assessment/Plan (OT)    Planned Therapy Interventions (OT)  activity tolerance training;adaptive equipment training;BADL retraining;IADL retraining;functional balance retraining;occupation/activity based interventions;patient/caregiver education/training;ROM/therapeutic exercise;strengthening exercise;transfer/mobility retraining  -ME       User Key  (r) = Recorded By, (t) = Taken By, (c) = Cosigned By    Initials Name Provider Type    Latonia Barnes OTR/L Occupational Therapist        Clinical Impression     Row Name 03/08/21 0959 03/08/21 0848       Pain Assessment    Additional Documentation  Pain Scale: Numbers Pre/Post-Treatment (Group)  -ME  Pain Scale: Numbers Pre/Post-Treatment (Group)  -ME    Row Name 03/08/21 0959          Pain Scale: Numbers Pre/Post-Treatment    Pretreatment Pain Rating  0/10 - no pain  -ME     Posttreatment Pain Rating  0/10 - no pain  -ME     Row Name 03/08/21 0959          Plan of Care Review    Plan of Care Reviewed  With  patient  -ME     Row Name 03/08/21 0959          Therapy Assessment/Plan (OT)    Patient/Family Therapy Goal Statement (OT)  to return home  -ME     Rehab Potential (OT)  good, to achieve stated therapy goals  -ME     Criteria for Skilled Therapeutic Interventions Met (OT)  yes;meets criteria;skilled treatment is necessary  -ME     Therapy Frequency (OT)  daily  -ME     Predicted Duration of Therapy Intervention (OT)  until d/c or all goals met  -ME     Row Name 03/08/21 0959          Therapy Plan Review/Discharge Plan (OT)    Anticipated Discharge Disposition (OT)  home;home with 24/7 care  -ME     Row Name 03/08/21 0959          Vital Signs    Pre Systolic BP Rehab  154  -ME     Pre Treatment Diastolic BP  72  -ME     Post Systolic BP Rehab  173  -ME     Post Treatment Diastolic BP  77  -ME     Pretreatment Heart Rate (beats/min)  57  -ME     Posttreatment Heart Rate (beats/min)  60  -ME     Pre SpO2 (%)  98  -ME     O2 Delivery Pre Treatment  room air  -ME     Post SpO2 (%)  98  -ME     O2 Delivery Post Treatment  room air  -ME     Pre Patient Position  Supine  -ME     Post Patient Position  Sitting  -ME     Row Name 03/08/21 0959          Positioning and Restraints    Pre-Treatment Position  in bed  -ME     Post Treatment Position  chair  -ME     In Chair  notified nsg;sitting;call light within reach;encouraged to call for assist  -ME       User Key  (r) = Recorded By, (t) = Taken By, (c) = Cosigned By    Initials Name Provider Type    ME Latonia See OTR/L Occupational Therapist        Outcome Measures     Row Name 03/08/21 0959          How much help from another is currently needed...    Putting on and taking off regular lower body clothing?  2  -ME     Bathing (including washing, rinsing, and drying)  2  -ME     Toileting (which includes using toilet bed pan or urinal)  3  -ME     Putting on and taking off regular upper body clothing  3  -ME     Taking care of personal grooming (such as brushing  teeth)  4  -ME     Eating meals  4  -ME     AM-PAC 6 Clicks Score (OT)  18  -ME     Row Name 03/08/21 0959          Modified [place] Scale    Pre-Stroke Modified [place] Scale  0 - No Symptoms at all.  -ME     Modified Edwin Scale  4 - Moderately severe disability.  Unable to walk without assistance, and unable to attend to own bodily needs without assistance.  -ME     Row Name 03/08/21 0959          Functional Assessment    Outcome Measure Options  AM-PAC 6 Clicks Daily Activity (OT);Modified [place]  -ME       User Key  (r) = Recorded By, (t) = Taken By, (c) = Cosigned By    Initials Name Provider Type    ME Latonia See, OTR/L Occupational Therapist        Occupational Therapy Education                 Title: PT OT SLP Therapies (In Progress)     Topic: Occupational Therapy (In Progress)     Point: ADL training (Not Started)     Description:   Instruct learner(s) on proper safety adaptation and remediation techniques during self care or transfers.   Instruct in proper use of assistive devices.              Learner Progress:  Not documented in this visit.          Point: Home exercise program (Not Started)     Description:   Instruct learner(s) on appropriate technique for monitoring, assisting and/or progressing therapeutic exercises/activities.              Learner Progress:  Not documented in this visit.          Point: Precautions (Done)     Description:   Instruct learner(s) on prescribed precautions during self-care and functional transfers.              Learning Progress Summary           Patient Acceptance, E, VU by ME at 3/8/2021 1114    Comment: Educated on OT and POC. Educated to call for assistance. Educated on safety precautions. Educated on discharge recommendations.                   Point: Body mechanics (Done)     Description:   Instruct learner(s) on proper positioning and spine alignment during self-care, functional mobility activities and/or exercises.              Learning Progress Summary            Patient Acceptance, E, VU by ME at 3/8/2021 1110    Comment: Educated on OT and POC. Educated to call for assistance. Educated on safety precautions. Educated on discharge recommendations.                               User Key     Initials Effective Dates Name Provider Type Discipline    ME 08/24/20 -  Esa Latonia, OTR/L Occupational Therapist OT              OT Recommendation and Plan  Planned Therapy Interventions (OT): activity tolerance training, adaptive equipment training, BADL retraining, IADL retraining, functional balance retraining, occupation/activity based interventions, patient/caregiver education/training, ROM/therapeutic exercise, strengthening exercise, transfer/mobility retraining  Therapy Frequency (OT): daily  Plan of Care Review  Plan of Care Reviewed With: patient  Outcome Summary: initial OT evaluation complete. pt was pleasant and cooperative throughout. oriented x 4. complete sup to sit bed mobility with supervision, increased time required for task completion. was dependent for donning socks. min A for donning/doffing hospital gown while sitting EOB. completed sit to stand transfers x 2 with min A/no RW. completed bed to chair transfer with min A. hand held assistance required. pt reaching for furniture with mobility and transfers. ambulated at bedside with min A. poor balance upon initial standing, but improved somewhat with increased time and time in standing. recommended use of RW to pt as pt balance is decreased at this time. BUE ROM WFL grossly. BUE strength and bilateral  strength are grossly 4/5. strength is equal on both sides. no coordination deficits identified. recommend further skilled OT services to address functional mobility and ADL deficits, as well as balance, strength, and endurance. recommend home with 24/7 care at discharge. spoke with pt about possibly having daugther stay with her once she gets home. goals established.     Time Calculation:   Time  Calculation- OT     Row Name 03/08/21 1114             Time Calculation- OT    OT Start Time  0959  -ME      OT Stop Time  1053  -ME      OT Time Calculation (min)  54 min  -ME      OT Received On  03/08/21  -ME      OT Goal Re-Cert Due Date  03/21/21  -ME        User Key  (r) = Recorded By, (t) = Taken By, (c) = Cosigned By    Initials Name Provider Type    ME Latonia See, OTR/L Occupational Therapist        Therapy Charges for Today     Code Description Service Date Service Provider Modifiers Ted    06276873168 HC OT EVAL MOD COMPLEXITY 4 3/8/2021 Latonia See OTR/L GO 1               Latonia See OTR/L  3/8/2021    Electronically signed by Latonia See OTR/L at 03/08/21 1115     Latonia See OTR/L at 03/08/21 0959        Goal Outcome Evaluation:  Plan of Care Reviewed With: patient     Outcome Summary: initial OT evaluation complete. pt was pleasant and cooperative throughout. oriented x 4. complete sup to sit bed mobility with supervision, increased time required for task completion. was dependent for donning socks. min A for donning/doffing hospital gown while sitting EOB. completed sit to stand transfers x 2 with min A/no RW. completed bed to chair transfer with min A. hand held assistance required. pt reaching for furniture with mobility and transfers. ambulated at bedside with min A. poor balance upon initial standing, but improved somewhat with increased time and time in standing. recommended use of RW to pt as pt balance is decreased at this time. BUE ROM WFL grossly. BUE strength and bilateral  strength are grossly 4/5. strength is equal on both sides. no coordination deficits identified. recommend further skilled OT services to address functional mobility and ADL deficits, as well as balance, strength, and endurance. recommend home with 24/7 care at discharge. spoke with pt about possibly having daugther stay with her once she gets home. goals established.    Electronically signed by  Elder, [place], OTR/L at 21 1114          Speech Language Pathology Notes (last 48 hours) (Notes from 21 0954 through 21 0954)      Ly Maynard MS CCC-SLP at 21 0944          Inpatient Rehabilitation - Speech Language Pathology   Swallow Initial Evaluation/Discharge [facility]     Patient Name: Josi Roe  : 1945  MRN: [id number]  Today's Date: 3/8/2021               Admit Date: 3/7/2021     ST evaluation completed this date. Pt passed the nursing swallow assessment and was started on a regular diet and thin liquids. Pt was finishing am meal upon SLP arrival. Pt completed meal with no concerns. No s/s of aspiration noted. Pt stated that she had no diet restrictions from home. Pt lives alone. Slurred speech was noted upon pt arrival in ED but that has since resolved. Pt was administered the SLUMs with a score of 28/30. No concerns noted for cognition, speech, or language. This is an evaluation only. Continue current diet of regular textures and thin liquids. Pt in agreement. RN aware.    Ly Maynard MS CCC-SLP    Visit Dx:    ICD-10-CM ICD-9-CM   1. Cerebrovascular accident (CVA), unspecified mechanism (CMS/HCC)  I63.9 434.91   2. Hypertensive emergency  I16.1 401.9     Patient Active Problem List   Diagnosis   • Cerebrovascular accident (CVA) (CMS/HCC)     Past Medical History:   Diagnosis Date   • Heart murmur    • Hypertension      Past Surgical History:   Procedure Laterality Date   • CHOLECYSTECTOMY     • CLUB FOOT RELEASE     • HYSTERECTOMY            SWALLOW EVALUATION (last 72 hours)      SLP Adult Swallow Evaluation     Row Name 21 0852                   Rehab Evaluation    Document Type  evaluation  -EA        Subjective Information  no complaints  -EA        Patient Observations  alert;cooperative;agree to therapy  -EA        Patient/Family/Caregiver Comments/Observations  none   -EA        Care Plan Review  care plan/treatment goals  reviewed  -EA        Patient Effort  good  -EA        Comment  Pt passed nursing swallow assessment and was upgraded to regular diet. Pt was completed am meal upon SLP entering.   -EA           General Information    Patient Profile Reviewed  yes  -EA        Pertinent History Of Current Problem  Hx of hypertension; no hx of dysphagia   -EA        Current Method of Nutrition  regular textures;thin liquids  -EA        Precautions/Limitations, Vision  WFL;for purposes of eval  -EA        Precautions/Limitations, Hearing  hearing impairment, right;WFL;for purposes of eval  -EA        Prior Level of Function-Communication  WFL  -EA        Prior Level of Function-Swallowing  no diet consistency restrictions  -EA        Plans/Goals Discussed with  patient  -EA        Barriers to Rehab  none identified  -EA        Patient's Goals for Discharge  return home  -EA           Pain    Additional Documentation  Pain Scale: FACES Pre/Post-Treatment (Group)  -EA           Pain Scale: FACES Pre/Post-Treatment    Pain: FACES Scale, Pretreatment  0-->no hurt  -EA        Posttreatment Pain Rating  0-->no hurt  -EA           Oral Motor Structure and Function    Dentition Assessment  upper dentures/partial in place;natural, present and adequate right partial   -EA        Secretion Management  WNL/WFL  -EA        Mucosal Quality  moist, healthy  -EA        Volitional Swallow  WFL  -EA        Volitional Cough  WFL  -EA           General Eating/Swallowing Observations    Respiratory Support Currently in Use  room air  -EA        Eating/Swallowing Skills  self-fed  -EA        Positioning During Eating  upright 90 degree;upright in bed  -EA        Utensils Used  spoon;cup;straw  -EA        Consistencies Trialed  regular textures;soft textures;thin liquids  -EA           Clinical Swallow Eval    Oral Prep Phase  WFL  -EA        Oral Transit  WFL  -EA        Oral Residue  WFL  -EA        Pharyngeal Phase  WFL  -EA        Esophageal Phase   unremarkable  -EA           Clinical Impression    SLP Swallowing Diagnosis  swallow WFL  -EA        Functional Impact  no impact on function  -EA        Swallow Criteria for Skilled Therapeutic Interventions Met  not appropriate;no problems identified which require skilled intervention  -EA           Recommendations    Therapy Frequency (Swallow)  evaluation only  -EA        SLP Diet Recommendation  regular textures;thin liquids  -EA        SLP Rec. for Method of Medication Administration  as tolerated  -EA        Anticipated Discharge Disposition (SLP)  home;unknown  -EA          User Key  (r) = Recorded By, (t) = Taken By, (c) = Cosigned By    Initials Name Effective Dates    Ly Mitchell MS CCC-SLP 08/09/20 -           EDUCATION  The patient has been educated in the following areas:   Cognitive Impairment Dysphagia (Swallowing Impairment).    SLP Recommendation and Plan  SLP Swallowing Diagnosis: swallow WFL  SLP Diet Recommendation: regular textures, thin liquids           Swallow Criteria for Skilled Therapeutic Interventions Met: not appropriate, no problems identified which require skilled intervention  Anticipated Discharge Disposition (SLP): home, unknown     Therapy Frequency (Swallow): evaluation only              Anticipated Discharge Disposition (SLP): home, unknown             Plan of Care Reviewed With: patient  Progress: improving  Outcome Summary: ST evaluation completed this date. Pt passed the nursing swallow assessment and was started on a regular diet and thin liquids. Pt was finishing am meal upon SLP arrival. Pt completed meal with no concerns. No s/s of aspiration noted. Pt stated that she had no diet restrictions from home. Pt lives alone. Slurred speech was noted upon pt arrival in ED but that has since resolved. Pt was administered the SLUMs with a score of 28/30. No concerns noted for cognition, speech, or language. This is an evaluation only. Continue current diet of regular  textures and thin liquids. Pt in agreement. RN aware.             Time Calculation:   Time Calculation- SLP     Row Name 03/08/21 0942             Time Calculation- SLP    SLP Start Time  0852  -EA      SLP Stop Time  0942  -EA      SLP Time Calculation (min)  50 min  -EA      Total Timed Code Minutes- SLP  50 minute(s)  -EA      SLP Received On  03/08/21  -EA      SLP Goal Re-Cert Due Date  03/22/21  -EA        User Key  (r) = Recorded By, (t) = Taken By, (c) = Cosigned By    Initials Name Provider Type    Ly Mitchell MS CCC-SLP Speech and Language Pathologist          Therapy Charges for Today     Code Description Service Date Service Provider Modifiers Qty    55891966462 HC ST EVAL SPEECH AND PROD W LANG  2 3/8/2021 Ly Maynard MS CCC-SLP GN 1    00637075943 HC ST EVAL ORAL PHARYNG SWALLOW 1 3/8/2021 Ly Maynard MS CCC-SLP GN 1               SLP Discharge Summary  Anticipated Discharge Disposition (SLP): home, unknown    MS LISA Adorno  3/8/2021    Electronically signed by Ly Maynard MS CCC-SLP at 03/08/21 0945     Ly Maynard MS CCC-SLP at 03/08/21 0942          Problem: Adult Inpatient Plan of Care  Goal: Plan of Care Review  Outcome: Ongoing, Progressing  Flowsheets (Taken 3/8/2021 0939)  Progress: improving  Plan of Care Reviewed With: patient  Outcome Summary: ST evaluation completed this date. Pt passed the nursing swallow assessment and was started on a regular diet and thin liquids. Pt was finishing am meal upon SLP arrival. Pt completed meal with no concerns. No s/s of aspiration noted. Pt stated that she had no diet restrictions from home. Pt lives alone. Slurred speech was noted upon pt arrival in ED but that has since resolved. Pt was administered the SLUMs with a score of 28/30. No concerns noted for cognition, speech, or language. This is an evaluation only. Continue current diet of regular textures and thin liquids. Pt in agreement.  RN aware.   Goal Outcome Evaluation:  Plan of Care Reviewed With: patient  Progress: improving  Outcome Summary: ST evaluation completed this date. Pt passed the nursing swallow assessment and was started on a regular diet and thin liquids. Pt was finishing am meal upon SLP arrival. Pt completed meal with no concerns. No s/s of aspiration noted. Pt stated that she had no diet restrictions from home. Pt lives alone. Slurred speech was noted upon pt arrival in ED but that has since resolved. Pt was administered the SLUMs with a score of 28/30. No concerns noted for cognition, speech, or language. This is an evaluation only. Continue current diet of regular textures and thin liquids. Pt in agreement. RN aware.    Electronically signed by Ly Maynard MS CCC-SLP at 03/08/21 0942        Jessica Amezcua RN  [facility]  [phone]  Fax [phone]

## 2021-03-09 NOTE — THERAPY TREATMENT NOTE
Patient Name: Josi Roe  : 1945    MRN: [id number]                              Today's Date: 3/9/2021       Admit Date: 3/7/2021    Visit Dx:     ICD-10-CM ICD-9-CM   1. Cerebrovascular accident (CVA) due to thrombosis of cerebral artery (CMS/HCC)  I63.30 434.01   2. Cerebrovascular accident (CVA), unspecified mechanism (CMS/HCC)  I63.9 434.91   3. Hypertensive emergency  I16.1 401.9   4. Impaired mobility and ADLs  Z74.09 V49.89    Z78.9    5. Impaired functional mobility, balance, gait, and endurance  Z74.09 V49.89   6. Other hereditary cerebrovascular disease   I67.858 437.8     Patient Active Problem List   Diagnosis   • Cerebrovascular accident (CVA) (CMS/HCC)     Past Medical History:   Diagnosis Date   • Heart murmur    • Hypertension      Past Surgical History:   Procedure Laterality Date   • CHOLECYSTECTOMY     • CLUB FOOT RELEASE     • HYSTERECTOMY       General Information     Row Name 2155          OT Time and Intention    Document Type  therapy note (daily note)  -AS     Mode of Treatment  individual therapy;occupational therapy  -AS     Row Name 21          General Information    Patient Profile Reviewed  yes  -AS     Row Name 21          Safety Issues, Functional Mobility    Safety Issues Affecting Function (Mobility)  awareness of need for assistance;judgment;problem-solving;safety precautions follow-through/compliance;safety precaution awareness;sequencing abilities  -AS     Impairments Affecting Function (Mobility)  endurance/activity tolerance;balance;coordination  -AS     Comment, Safety Issues/Impairments (Mobility)  lacks initiation  -AS       User Key  (r) = Recorded By, (t) = Taken By, (c) = Cosigned By    Initials Name Provider Type    AS Lucy Fernandes OT Occupational Therapist          Mobility/ADL's     Row Name 21          Bed Mobility    Comment (Bed Mobility)  Pt rec'd seated in chair  -AS     Row Name 21           "Transfers    Transfers  sit-stand transfer  -AS     Comment (Transfers)  sit<>stand x2; 1 LOB with retropulsion back to chair  -AS     Sit-Stand [place] (Transfers)  standby assist  -AS     Row Name 03/09/21 0855          Sit-Stand Transfer    Assistive Device (Sit-Stand Transfers)  walker, front-wheeled  -AS     Row Name 03/09/21 0855          Activities of Daily Living    BADL Assessment/Intervention  bathing;lower body dressing;upper body dressing;grooming;toileting  -AS     Row Name 03/09/21 0855          Lower Body Dressing Assessment/Training    [place] Level (Lower Body Dressing)  don;pants/bottoms;minimum assist (75% patient effort)  -AS     Position (Lower Body Dressing)  unsupported sitting;supported standing  -AS     Row Name 03/09/21 0855          Upper Body Dressing Assessment/Training    [place] Level (Upper Body Dressing)  don;other (see comments);minimum assist (75% patient effort) hospital gown  -AS     Position (Upper Body Dressing)  supported sitting  -AS     Row Name 03/09/21 0855          Bathing Assessment/Intervention    [place] Level (Bathing)  upper body;verbal cues;set up;lower body;minimum assist (75% patient effort)  -AS     Position (Bathing)  unsupported sitting;supported standing  -AS     Comment (Bathing)  vc for initation and sequencing; min A for thorough wasing of ángel area and buttocks  -AS     Row Name 03/09/21 0855          Grooming Assessment/Training    [place] Level (Grooming)  wash face, hands;hair care, combing/brushing;oral care regimen  -AS     Position (Grooming)  unsupported sitting  -AS     Row Name 03/09/21 0855          Toileting Assessment/Training    Comment (Toileting)  pt found seated in chair with pure whick; pt linens and chair soiled. OT asked pt if she knew chair was soiled, pt stated \"yes\". OT asked why pt did ot contact nursing and pt stated \" I don't know\"  -AS       User Key  (r) = Recorded By, (t) = Taken By, (c) = Cosigned By    " Initials Name Provider Type    AS Lucy Fernandes OT Occupational Therapist        Obj/Interventions    No documentation.       Goals/Plan     Row Name 03/09/21 0850          Transfer Goal 1 (OT)    Activity/Assistive Device (Transfer Goal 1, OT)  toilet  -AS     [place] Level/Cues Needed (Transfer Goal 1, OT)  supervision required  -AS     Time Frame (Transfer Goal 1, OT)  long term goal (LTG);by discharge  -AS     Progress/Outcome (Transfer Goal 1, OT)  goal not met  -AS     Row Name 03/09/21 0850          Bathing Goal 1 (OT)    Activity/Device (Bathing Goal 1, OT)  lower body bathing AD as needed  -AS     [place] Level/Cues Needed (Bathing Goal 1, OT)  supervision required  -AS     Time Frame (Bathing Goal 1, OT)  long term goal (LTG);by discharge  -AS     Progress/Outcomes (Bathing Goal 1, OT)  goal not met  -AS     Row Name 03/09/21 0850          Dressing Goal 1 (OT)    Activity/Device (Dressing Goal 1, OT)  lower body dressing AD as needed  -AS     [place]/Cues Needed (Dressing Goal 1, OT)  supervision required  -AS     Time Frame (Dressing Goal 1, OT)  long term goal (LTG);by discharge  -AS     Progress/Outcome (Dressing Goal 1, OT)  goal not met  -AS       User Key  (r) = Recorded By, (t) = Taken By, (c) = Cosigned By    Initials Name Provider Type    AS Lucy Fernandes OT Occupational Therapist        Clinical Impression     Row Name 03/09/21 0850          Pain Assessment    Additional Documentation  Pain Scale: Numbers Pre/Post-Treatment (Group)  -AS     Row Name 03/09/21 0850          Pain Scale: Numbers Pre/Post-Treatment    Pretreatment Pain Rating  0/10 - no pain  -AS     Posttreatment Pain Rating  0/10 - no pain  -AS     Row Name 03/09/21 0850          Plan of Care Review    Plan of Care Reviewed With  patient  -AS     Row Name 03/09/21 0850          Vital Signs    Pre Systolic BP Rehab  174  -AS     Pre Treatment Diastolic BP  77  -AS     Post Systolic BP Rehab  149  -AS     Post  Treatment Diastolic BP  67  -AS     Pretreatment Heart Rate (beats/min)  45  -AS     Posttreatment Heart Rate (beats/min)  50  -AS     Pre SpO2 (%)  100  -AS     O2 Delivery Pre Treatment  room air  -AS     Post SpO2 (%)  95  -AS     O2 Delivery Post Treatment  room air  -AS     Pre Patient Position  Sitting  -AS     Post Patient Position  Sitting  -AS     Row Name 03/09/21 0850          Positioning and Restraints    Pre-Treatment Position  sitting in chair/recliner  -AS     Post Treatment Position  chair  -AS     In Chair  notified nsg;reclined;call light within reach;encouraged to call for assist  -AS       User Key  (r) = Recorded By, (t) = Taken By, (c) = Cosigned By    Initials Name Provider Type    AS Lucy Fernandes OT Occupational Therapist        Outcome Measures     Row Name 03/09/21 0850          How much help from another is currently needed...    Putting on and taking off regular lower body clothing?  3  -AS     Bathing (including washing, rinsing, and drying)  3  -AS     Toileting (which includes using toilet bed pan or urinal)  2  -AS     Putting on and taking off regular upper body clothing  3  -AS     Taking care of personal grooming (such as brushing teeth)  3  -AS     Eating meals  4  -AS     AM-PAC 6 Clicks Score (OT)  18  -AS     Row Name 03/09/21 0850          Functional Assessment    Outcome Measure Options  AM-PAC 6 Clicks Daily Activity (OT)  -AS       User Key  (r) = Recorded By, (t) = Taken By, (c) = Cosigned By    Initials Name Provider Type    AS Lucy Fernandes OT Occupational Therapist        Occupational Therapy Education                 Title: PT OT SLP Therapies (In Progress)     Topic: Occupational Therapy (In Progress)     Point: ADL training (Done)     Description:   Instruct learner(s) on proper safety adaptation and remediation techniques during self care or transfers.   Instruct in proper use of assistive devices.              Learning Progress Summary           Patient  Acceptance, E, VU by AS at 3/9/2021 0933    Comment: ADL training, transfer training, safety edu                   Point: Home exercise program (Not Started)     Description:   Instruct learner(s) on appropriate technique for monitoring, assisting and/or progressing therapeutic exercises/activities.              Learner Progress:  Not documented in this visit.          Point: Precautions (Done)     Description:   Instruct learner(s) on prescribed precautions during self-care and functional transfers.              Learning Progress Summary           Patient Acceptance, E, VU by AS at 3/9/2021 0933    Comment: ADL training, transfer training, safety edu    Acceptance, E, VU by ME at 3/8/2021 1114    Comment: Educated on OT and POC. Educated to call for assistance. Educated on safety precautions. Educated on discharge recommendations.                   Point: Body mechanics (Done)     Description:   Instruct learner(s) on proper positioning and spine alignment during self-care, functional mobility activities and/or exercises.              Learning Progress Summary           Patient Acceptance, E, VU by ME at 3/8/2021 1114    Comment: Educated on OT and POC. Educated to call for assistance. Educated on safety precautions. Educated on discharge recommendations.                               User Key     Initials Effective Dates Name Provider Type Discipline    AS 07/05/20 -  Lucy Fernandes OT Occupational Therapist OT    ME 08/24/20 -  Latonia See OTR/L Occupational Therapist OT              OT Recommendation and Plan     Plan of Care Review  Plan of Care Reviewed With: patient  Outcome Summary: OT tx completed. Pt agreeable and cooperative, flat affect. Pt performed ADL tasks while up seated in chair. Pt performed UBB with s/u, LBB with min A (requires assist thoroughly washing ángel areas), LBD with min A, and grooming tasks with s/u. Pt performed sit<>stand from chair x2 with SBA. Pt had 1 LOB back to chair.  Pt  "demo overall decreased initation and requires vc for sequencing and problem solving. Pt found to be in soiled chair. When asked if she knew chair/pads where soiled, pt stated \"yes.\" OT asked why pt did not notify nurse and pt stated \" I don't know.\" No goals met on this date, cont OT POC. If pt returns home, will need 24/7 assist.     Time Calculation:   Time Calculation- OT     Row Name 03/09/21 0943             Time Calculation- OT    OT Start Time  0850  -AS      OT Stop Time  0943  -AS      OT Time Calculation (min)  53 min  -AS      OT Received On  03/09/21  -AS        User Key  (r) = Recorded By, (t) = Taken By, (c) = Cosigned By    Initials Name Provider Type    AS Lucy Fernandes OT Occupational Therapist        Therapy Charges for Today     Code Description Service Date Service Provider Modifiers Qty    24554543637 HC OT SELF CARE/MGMT/TRAIN EA 15 MIN 3/9/2021 Lucy Fernandes OT GO 4               Lucy Fernandes OT  3/9/2021  "

## 2021-03-09 NOTE — THERAPY TREATMENT NOTE
Acute Care - Physical Therapy Treatment Note  [facility]     Patient Name: Josi Roe  : 1945  MRN: [id number]  Today's Date: 3/9/2021           PT Assessment (last 12 hours)      PT Evaluation and Treatment     Row Name 21 1310          Physical Therapy Time and Intention    Subjective Information  no complaints  -     Document Type  therapy note (daily note)  -JOSÉ MIGUEL     Mode of Treatment  individual therapy;physical therapy  -     Patient Effort  good  -     Row Name 21 131          General Information    Patient Profile Reviewed  yes  -     Existing Precautions/Restrictions  fall  -     Row Name 21 131          Cognition    Orientation Status (Cognition)  oriented x 4  -     Row Name 21 131          Pain Scale: Numbers Pre/Post-Treatment    Pretreatment Pain Rating  0/10 - no pain  -     Posttreatment Pain Rating  0/10 - no pain  -[facility] Name 21 131          Transfers    Transfers  sit-stand transfer;stand-sit transfer  -     Sit-Stand [place] (Transfers)  contact guard;verbal cues  -     Stand-Sit [place] (Transfers)  contact guard;verbal cues  -[facility] Name 21 131          Sit-Stand Transfer    Assistive Device (Sit-Stand Transfers)  walker, front-wheeled  -     Row Name 21 131          Stand-Sit Transfer    Assistive Device (Stand-Sit Transfers)  walker, front-wheeled  -[facility] Name 21 131          Gait/Stairs (Locomotion)    [place] Level (Gait)  contact guard;minimum assist (75% patient effort)  -JOSÉ MIGUEL     Assistive Device (Gait)  walker, front-wheeled  -     Distance in Feet (Gait)  4'x2 forward-back   -JOSÉ MIGUEL     Pattern (Gait)  step-to  -     Deviations/Abnormal Patterns (Gait)  ataxic;base of support, wide;gait speed decreased  -JOSÉ MIGUEL     Comment (Gait/Stairs)  Pt. requires max v.c.'s for AD proximity during gt this p.m. Pt. pushes walker too far out in front   -[facility] Name 21 131           Safety Issues, Functional Mobility    Impairments Affecting Function (Mobility)  endurance/activity tolerance;balance;coordination  -     Row Name 03/09/21 1310          Hip (Therapeutic Exercise)    Hip AROM (Therapeutic Exercise)  bilateral;flexion x20  -     Row Name 03/09/21 1310          Knee (Therapeutic Exercise)    Knee AROM (Therapeutic Exercise)  bilateral;LAQ (long arc quad) x20  -     Row Name 03/09/21 1310          Ankle (Therapeutic Exercise)    Ankle AROM (Therapeutic Exercise)  bilateral;dorsiflexion;plantarflexion x20  -     Row Name 03/09/21 1310          Vital Signs    Pre Systolic BP Rehab  173  -     Pre Treatment Diastolic BP  80  -JA     Pretreatment Heart Rate (beats/min)  47  -JA     Posttreatment Heart Rate (beats/min)  43  -     Pre SpO2 (%)  94  -     O2 Delivery Pre Treatment  room air  -     Post SpO2 (%)  99  -JA     O2 Delivery Post Treatment  room air  -JA     Pre Patient Position  Sitting  -     Intra Patient Position  Standing  -     Post Patient Position  Sitting  -     Row Name 03/09/21 1310          Positioning and Restraints    Pre-Treatment Position  sitting in chair/recliner  -     Post Treatment Position  chair  -     In Chair  sitting;call light within reach;encouraged to call for assist;exit alarm on;reclined;notified nsg  -     Row Name 03/09/21 1310          Progress Summary (PT)    Progress Toward Functional Goals (PT)  progress toward functional goals as expected  -       User Key  (r) = Recorded By, (t) = Taken By, (c) = Cosigned By    Initials Name Provider Type    Yong Whitney, GWEN Physical Therapy Assistant        Physical Therapy Education                 Title: PT OT SLP Therapies (In Progress)     Topic: Physical Therapy (Done)     Point: Mobility training (Done)     Learning Progress Summary           Patient Acceptance, D,E, VU,NR by VASHTI at 3/8/2021 0119    Comment: POC, need for more control in use of FWRW, rec  assist at home if d/c home; may benefit from skilled care before home                   Point: Home exercise program (Done)     Learning Progress Summary           Patient Acceptance, D,E, VU,NR by VASHTI at 3/8/2021 1258    Comment: POC, need for more control in use of FWRW, rec assist at home if d/c home; may benefit from skilled care before home                   Point: Body mechanics (Done)     Learning Progress Summary           Patient Acceptance, D,E, VU,NR by VASHTI at 3/8/2021 1258    Comment: POC, need for more control in use of FWRW, rec assist at home if d/c home; may benefit from skilled care before home                   Point: Precautions (Done)     Learning Progress Summary           Patient Acceptance, D,E, VU,NR by VASHTI at 3/8/2021 1258    Comment: POC, need for more control in use of FWRW, rec assist at home if d/c home; may benefit from skilled care before home                               User Key     Initials Effective Dates Name Provider Type Discipline     04/03/18 -  Mounika Anand, PT Physical Therapist PT              PT Recommendation and Plan     Progress Summary (PT)  Progress Toward Functional Goals (PT): progress toward functional goals as expected  Plan of Care Reviewed With: patient  Progress: improving  Outcome Summary: Pt. with bradycardia during PT treatment, no distress noted. Pt. transferred CGA sit-stand-sit amb. 4' F-B with RW CGA/Donna with ataxic gt., performed x20 reps seated therex in recliner       Time Calculation:   PT Charges     Row Name 03/09/21 1554             Time Calculation    Start Time  1315  -JA      Stop Time  1350  -JA      Time Calculation (min)  35 min  -JA         Time Calculation- PT    Total Timed Code Minutes- PT  35 minute(s)  -JA        User Key  (r) = Recorded By, (t) = Taken By, (c) = Cosigned By    Initials Name Provider Type    Yong Whitney, PTA Physical Therapy Assistant        Therapy Charges for Today     Code Description  Service Date Service Provider Modifiers Qty    52878226523  PT THERAPEUTIC ACT EA 15 MIN 3/9/2021 Yong Brizuela, PTA GP 1    04342743344 HC PT THER PROC EA 15 MIN 3/9/2021 Yong Brizuela, PTA GP 1          PT G-Codes  Outcome Measure Options: 9 Hole Peg  AM-PAC 6 Clicks Score (PT): 18  AM-PAC 6 Clicks Score (OT): 18  Modified [place] Scale: 4 - Moderately severe disability.  Unable to walk without assistance, and unable to attend to own bodily needs without assistance.    Yong Brizuela PTA  3/9/2021

## 2021-03-09 NOTE — PROGRESS NOTES
Stroke Progress Note       Chief Complaint: Slurred speech    Subjective     HPI: Pt is a 75-year-old right-handed white female with known diagnosis of hypertension, on as needed medications, hyperlipidemia, who was brought in by her granddaughter for slurred speech. Upon exam strengths are equal 5/5. Denies weakness, numbness, or vision changes. PT recommends IRF or home with 24/7 care for now. Pt states interest in IRF before home.       Review of Systems   HENT: Negative.    Eyes: Negative.    Respiratory: Negative.    Cardiovascular:        Bradycardia.   Musculoskeletal: Negative.    Neurological: Negative.    Hematological: Negative.    Psychiatric/Behavioral: Negative.         Objective      Temp:  [96.7 °F (35.9 °C)-98.5 °F (36.9 °C)] 98.5 °F (36.9 °C)  Heart Rate:  [42-72] 42  Resp:  [18] 18  BP: (141-177)/(61-86) 153/67    Neurological Exam  Mental Status  Awake, alert and oriented to person, place and time.Alert. Recent and remote memory are intact. Speech is normal. Language is fluent with no aphasia. Attention and concentration are normal. Fund of knowledge is appropriate for level of education.    Cranial Nerves  CN II: Visual acuity is normal. Visual fields full to confrontation.  CN III, IV, VI: Extraocular movements intact bilaterally. Normal lids and orbits bilaterally. Pupils equal round and reactive to light bilaterally.  CN V: Facial sensation is normal.  CN VII: Full and symmetric facial movement.  CN IX, X: Palate elevates symmetrically. Normal gag reflex.  CN XI: Shoulder shrug strength is normal.  CN XII: Tongue midline without atrophy or fasciculations.    Motor  Normal muscle bulk throughout. No fasciculations present. Strength is 5/5 throughout all four extremities.    Sensory  Sensation is intact to light touch, pinprick, vibration and proprioception in all four extremities.    Reflexes  Not tested..    Coordination  Finger-to-nose, rapid alternating movements and heel-to-shin normal  bilaterally without dysmetria.    Gait  Not tested..      Physical Exam  Vitals and nursing note reviewed.   Constitutional:       Appearance: Normal appearance.   HENT:      Head: Normocephalic and atraumatic.   Eyes:      General: Lids are normal.      Extraocular Movements: Extraocular movements intact.      Pupils: Pupils are equal, round, and reactive to light.   Cardiovascular:      Rate and Rhythm: Bradycardia present.   Pulmonary:      Effort: Pulmonary effort is normal.   Musculoskeletal:         General: Normal range of motion.      Cervical back: Normal range of motion.   Neurological:      General: No focal deficit present.      Mental Status: She is alert and oriented to person, place, and time.      Coordination: Coordination is intact.      Deep Tendon Reflexes: Strength normal.   Psychiatric:         Mood and Affect: Mood normal.         Speech: Speech normal.         Results Review:    I reviewed the patient's new clinical results.    Lab Results (last 24 hours)     ** No results found for the last 24 hours. **        Adult Transthoracic Echo Complete W/ Cont if Necessary Per Protocol    Addendum Date: 3/8/2021    · Left ventricular wall thickness is consistent with mild to moderate concentric hypertrophy. · Estimated left ventricular EF = 61% Left ventricular ejection fraction appears to be 61 - 65%. Left ventricular systolic function is normal. · Left ventricular diastolic function is consistent with (grade I) impaired relaxation. · Left atrial volume is mildly increased. · Saline test results are negative.      CT Head Without Contrast    Result Date: 3/7/2021  Old infarct left basal ganglia, caudate nucleus. Old lacunar infarct right thalamus. CT brain without contrast is otherwise unremarkable. There are no acute changes. Electronically signed by:  Favian Pittman MD  3/7/2021 11:25 AM CST Workstation: [id number]    MRI Brain Without Contrast    Result Date: 3/7/2021  CONCLUSION: Acute 1.5 cm infarct  left side of the renny. Old 1.9 cm infarct anterior aspect of the left basal ganglia. Old thalamic and basal ganglia lacunar infarcts. Minimal small vessel disease. Cerebral atrophy. Report called at approximately 1:50 PM [facility]. [id number] Electronically signed by:  Carlos Keith MD  3/7/2021 1:38 PM [facility] Workstation: [email or web address] [id number] [email or web address] View    Result Date: 3/7/2021  No evidence of active disease. Electronically signed by:  Favian Pittman MD  3/7/2021 10:06 AM CST Workstation: [id number]    CT Angiogram Carotids    Result Date: 3/7/2021  Unremarkable CT angiography of the brain. Unremarkable CT angiography of the neck. Note is made of multiple bilateral thyroid nodules, the largest nodule seen in the right side and measuring 2.4 cm and can be evaluated electively with an ultrasound of the thyroid Electronically signed by:  Christian Davis MD  3/7/2021 4:43 PM [facility] Workstation: [id number]    CT Angiogram Head    Result Date: 3/7/2021  Unremarkable CT angiography of the brain. Unremarkable CT angiography of the neck. Note is made of multiple bilateral thyroid nodules, the largest nodule seen in the right side and measuring 2.4 cm and can be evaluated electively with an ultrasound of the thyroid Electronically signed by:  Christian Davis MD  3/7/2021 4:43 PM [facility] Workstation: [id number]    Results for orders placed during the hospital encounter of 03/07/21    Adult Transthoracic Echo Complete W/ Cont if Necessary Per Protocol    Interpretation Summary  · Left ventricular wall thickness is consistent with mild to moderate concentric hypertrophy.  · Estimated left ventricular EF = 61% Left ventricular ejection fraction appears to be 61 - 65%. Left ventricular systolic function is normal.  · Left ventricular diastolic function is consistent with (grade I) impaired relaxation.  · Left atrial volume is mildly increased.  · Saline test results are negative.        Assessment/Plan     Assessment/Plan: Pt is a 75-year-old  right-handed white female with known diagnosis of hypertension, on as needed medications, hyperlipidemia, who was brought in by her granddaughter for slurred speech. Upon exam strengths are equal 5/5. Denies weakness, numbness, or vision changes. PT recommends IRF or home with 24/7 care for now. Pt states interest in IRF before home.   1. Left pontine stroke- Likely lacunar d/t uncontrolled hypertension. Continue aspirin 325 mg, Plavix 75 mg, and Lipitor 80 mg/day for secondary stroke prevention. Echo shows EF 61-65% and no left atrial dilation, volume is mildly increased. Pt will have loop recorder placed prior to d/c. Discussed with Dr. Summers.  2. Essential hypertension- Off cardene gtt. Instructed on the importance of daily BP monitoring and follow-up with PCP to reduce stroke risk.  3. Hyperlipidemia- LDL 81, cont Lipitor 80 mg/day for secondary stroke prevention.  4. Sinus Bradycardia- Her HR is still in the 40's at times. Recommend continue monitoring and follow-up with cardiology.   5. Activity- Cont to work with PT/OT.  6. Diet- Heart-healthy diet.   Case was discussed with pt, Dr. Potter, Hospitalist team, and nursing. Will sign off for now.          Patient Active Problem List   Diagnosis   • Cerebrovascular accident (CVA) (CMS/[facility])           FIDEL Klein  03/09/21  08:00 CST

## 2021-03-10 PROCEDURE — 97535 SELF CARE MNGMENT TRAINING: CPT

## 2021-03-10 PROCEDURE — 97110 THERAPEUTIC EXERCISES: CPT

## 2021-03-10 PROCEDURE — 97116 GAIT TRAINING THERAPY: CPT

## 2021-03-10 PROCEDURE — 99233 SBSQ HOSP IP/OBS HIGH 50: CPT | Performed by: INTERNAL MEDICINE

## 2021-03-10 PROCEDURE — 25010000002 ENOXAPARIN PER 10 MG: Performed by: INTERNAL MEDICINE

## 2021-03-10 RX ORDER — AMLODIPINE BESYLATE 5 MG/1
5 TABLET ORAL
Status: DISCONTINUED | OUTPATIENT
Start: 2021-03-10 | End: 2021-03-11

## 2021-03-10 RX ORDER — LOSARTAN POTASSIUM 50 MG/1
50 TABLET ORAL 2 TIMES DAILY
Status: DISCONTINUED | OUTPATIENT
Start: 2021-03-10 | End: 2021-03-12 | Stop reason: HOSPADM

## 2021-03-10 RX ADMIN — LOSARTAN POTASSIUM 75 MG: 50 TABLET, FILM COATED ORAL at 08:24

## 2021-03-10 RX ADMIN — SODIUM CHLORIDE 75 ML/HR: 900 INJECTION, SOLUTION INTRAVENOUS at 00:09

## 2021-03-10 RX ADMIN — SODIUM CHLORIDE, PRESERVATIVE FREE 10 ML: 5 INJECTION INTRAVENOUS at 20:18

## 2021-03-10 RX ADMIN — HYDRALAZINE HYDROCHLORIDE 25 MG: 25 TABLET, FILM COATED ORAL at 05:11

## 2021-03-10 RX ADMIN — AMLODIPINE BESYLATE 5 MG: 5 TABLET ORAL at 12:48

## 2021-03-10 RX ADMIN — ENALAPRILAT 1.25 MG: 1.25 INJECTION INTRAVENOUS at 02:03

## 2021-03-10 RX ADMIN — ATORVASTATIN CALCIUM 80 MG: 40 TABLET, FILM COATED ORAL at 08:24

## 2021-03-10 RX ADMIN — ENOXAPARIN SODIUM 40 MG: 40 INJECTION SUBCUTANEOUS at 15:47

## 2021-03-10 RX ADMIN — PANTOPRAZOLE SODIUM 40 MG: 40 TABLET, DELAYED RELEASE ORAL at 05:11

## 2021-03-10 RX ADMIN — CLOPIDOGREL BISULFATE 75 MG: 75 TABLET ORAL at 08:24

## 2021-03-10 RX ADMIN — SODIUM CHLORIDE, PRESERVATIVE FREE 10 ML: 5 INJECTION INTRAVENOUS at 08:24

## 2021-03-10 RX ADMIN — ASPIRIN 325 MG: 325 TABLET ORAL at 08:24

## 2021-03-10 RX ADMIN — ENALAPRILAT 1.25 MG: 1.25 INJECTION INTRAVENOUS at 23:29

## 2021-03-10 RX ADMIN — LOSARTAN POTASSIUM 50 MG: 50 TABLET, FILM COATED ORAL at 20:18

## 2021-03-10 NOTE — THERAPY TREATMENT NOTE
Patient Name: Josi Roe  : 1945    MRN: [id number]                              Today's Date: 3/10/2021       Admit Date: 3/7/2021    Visit Dx:     ICD-10-CM ICD-9-CM   1. Cerebrovascular accident (CVA) due to thrombosis of cerebral artery (CMS/HCC)  I63.30 434.01   2. Cerebrovascular accident (CVA), unspecified mechanism (CMS/HCC)  I63.9 434.91   3. Hypertensive emergency  I16.1 401.9   4. Impaired mobility and ADLs  Z74.09 V49.89    Z78.9    5. Impaired functional mobility, balance, gait, and endurance  Z74.09 V49.89   6. Other hereditary cerebrovascular disease   I67.858 437.8     Patient Active Problem List   Diagnosis   • Cerebrovascular accident (CVA) (CMS/HCC)     Past Medical History:   Diagnosis Date   • Heart murmur    • Hypertension      Past Surgical History:   Procedure Laterality Date   • CHOLECYSTECTOMY     • CLUB FOOT RELEASE     • HYSTERECTOMY       General Information     Row Name 03/10/21 0830          OT Time and Intention    Document Type  therapy note (daily note)  -LW     Mode of Treatment  occupational therapy;individual therapy  -     Row Name 03/10/21 0830          General Information    Patient Profile Reviewed  yes  -LW     Existing Precautions/Restrictions  fall  -LW     Row Name 03/10/21 0830          Cognition    Orientation Status (Cognition)  oriented x 4  -LW     Row Name 03/10/21 0830          Safety Issues, Functional Mobility    Impairments Affecting Function (Mobility)  endurance/activity tolerance;balance;coordination  -LW       User Key  (r) = Recorded By, (t) = Taken By, (c) = Cosigned By    Initials Name Provider Type    LW Prabha Garcia COTA/L Occupational Therapy Assistant          Mobility/ADL's     Row Name 03/10/21 0830          Bed Mobility    Bed Mobility  supine-sit  -LW     Scooting/Bridging [place] (Bed Mobility)  standby assist  -LW     Assistive Device (Bed Mobility)  bed rails  -LW     Row Name 03/10/21 0830          Transfers     Transfers  bed-chair transfer  -LW     Bed-Chair [place] (Transfers)  contact guard  -LW     Assistive Device (Bed-Chair Transfers)  walker, front-wheeled  -LW     Sit-Stand [place] (Transfers)  standby assist  -LW     Row Name 03/10/21 0830          Sit-Stand Transfer    Assistive Device (Sit-Stand Transfers)  walker, front-wheeled  -LW     Row Name 03/10/21 0830          Functional Mobility    Functional Mobility- Ind. Level  contact guard assist  -LW     Functional Mobility- Device  rolling walker  -LW     Row Name 03/10/21 0830          Activities of Daily Living    BADL Assessment/Intervention  bathing;upper body dressing;lower body dressing;grooming  -LW     Row Name 03/10/21 0830          Lower Body Dressing Assessment/Training    [place] Level (Lower Body Dressing)  lower body dressing skills;doff;don;pants/bottoms;socks;standby assist;set up  -LW     Position (Lower Body Dressing)  supported sitting  -LW     Row Name 03/10/21 0830          Upper Body Dressing Assessment/Training    [place] Level (Upper Body Dressing)  doff;don;upper body dressing skills;other (see comments) Hospital gown  -LW     Position (Upper Body Dressing)  supported sitting  -LW     Row Name 03/10/21 0830          Bathing Assessment/Intervention    [place] Level (Bathing)  lower body;upper body;standby assist;set up  -LW     Position (Bathing)  supported sitting  -LW     Comment (Bathing)  Pt performed chaney bath  -LW     Row Name 03/10/21 0830          Grooming Assessment/Training    [place] Level (Grooming)  hair care, combing/brushing;oral care regimen;wash face, hands;standby assist;set up  -LW     Position (Grooming)  supported sitting  -LW       User Key  (r) = Recorded By, (t) = Taken By, (c) = Cosigned By    Initials Name Provider Type    Prabha Hernandez COTA/L Occupational Therapy Assistant        Obj/Interventions    No documentation.       Goals/Plan     Row Name 03/10/21 0830           Transfer Goal 1 (OT)    Activity/Assistive Device (Transfer Goal 1, OT)  toilet  -LW     [place] Level/Cues Needed (Transfer Goal 1, OT)  supervision required  -LW     Time Frame (Transfer Goal 1, OT)  long term goal (LTG);by discharge  -LW     Progress/Outcome (Transfer Goal 1, OT)  goal not met  -LW     Row Name 03/10/21 0830          Bathing Goal 1 (OT)    Activity/Device (Bathing Goal 1, OT)  lower body bathing AD as needed  -LW     [place] Level/Cues Needed (Bathing Goal 1, OT)  supervision required  -LW     Time Frame (Bathing Goal 1, OT)  long term goal (LTG);by discharge  -LW     Progress/Outcomes (Bathing Goal 1, OT)  goal not met  -LW     Row Name 03/10/21 0830          Dressing Goal 1 (OT)    Activity/Device (Dressing Goal 1, OT)  lower body dressing AD as needed  -LW     [place]/Cues Needed (Dressing Goal 1, OT)  supervision required  -LW     Time Frame (Dressing Goal 1, OT)  long term goal (LTG);by discharge  -LW     Progress/Outcome (Dressing Goal 1, OT)  goal not met  -LW       User Key  (r) = Recorded By, (t) = Taken By, (c) = Cosigned By    Initials Name Provider Type    LW Prabha Garcia COTA/L Occupational Therapy Assistant        Clinical Impression     Row Name 03/10/21 0830          Pain Scale: Numbers Pre/Post-Treatment    Pretreatment Pain Rating  0/10 - no pain  -LW     Posttreatment Pain Rating  0/10 - no pain  -LW     Row Name 03/10/21 0830          Plan of Care Review    Plan of Care Reviewed With  patient  -LW     Outcome Summary  Pt sup to sit with SBA. Sit to stand SBA. T/f to bschair CGA. Pt completed UB and LB bathing and dressing with SBA. Pt completed grooming with SBA. Pt reclined in bschair all needs in reach. Nsg aware.  -     Row Name 03/10/21 0830          Vital Signs    Pre Treatment Diastolic BP  84  -LW     Intra Systolic BP Rehab  185  -LW     Intra Treatment Diastolic BP  83  -LW     Pretreatment Heart Rate (beats/min)  76  -LW     Pre SpO2 (%)  100   -LW     O2 Delivery Pre Treatment  room air  -LW     O2 Delivery Intra Treatment  room air  -LW     Row Name 03/10/21 0830          Positioning and Restraints    Pre-Treatment Position  in bed  -LW     Post Treatment Position  chair  -LW     In Chair  notified nsg;reclined;call light within reach;encouraged to call for assist;exit alarm on  -LW       User Key  (r) = Recorded By, (t) = Taken By, (c) = Cosigned By    Initials Name Provider Type    Prabha Hernandez COTA/L Occupational Therapy Assistant        Outcome Measures     Row Name 03/10/21 0830          How much help from another is currently needed...    Putting on and taking off regular lower body clothing?  3  -LW     Bathing (including washing, rinsing, and drying)  3  -LW     Toileting (which includes using toilet bed pan or urinal)  2  -LW     Putting on and taking off regular upper body clothing  3  -LW     Taking care of personal grooming (such as brushing teeth)  4  -LW     Eating meals  4  -LW     AM-PAC 6 Clicks Score (OT)  19  -LW       User Key  (r) = Recorded By, (t) = Taken By, (c) = Cosigned By    Initials Name Provider Type    Prabha Hernandez COTA/L Occupational Therapy Assistant        Occupational Therapy Education                 Title: PT OT SLP Therapies (Done)     Topic: Occupational Therapy (Done)     Point: ADL training (Done)     Description:   Instruct learner(s) on proper safety adaptation and remediation techniques during self care or transfers.   Instruct in proper use of assistive devices.              Learning Progress Summary           Patient Acceptance, E,TB, VU by LW at 3/10/2021 1259    Acceptance, E, VU by AS at 3/9/2021 0933    Comment: ADL training, transfer training, safety edu                   Point: Home exercise program (Done)     Description:   Instruct learner(s) on appropriate technique for monitoring, assisting and/or progressing therapeutic exercises/activities.              Learning Progress Summary            Patient Acceptance, E,TB, VU by LW at 3/10/2021 1259                   Point: Precautions (Done)     Description:   Instruct learner(s) on prescribed precautions during self-care and functional transfers.              Learning Progress Summary           Patient Acceptance, E,TB, VU by LW at 3/10/2021 1259    Acceptance, E, VU by AS at 3/9/2021 0933    Comment: ADL training, transfer training, safety edu    Acceptance, E, VU by ME at 3/8/2021 1114    Comment: Educated on OT and POC. Educated to call for assistance. Educated on safety precautions. Educated on discharge recommendations.                   Point: Body mechanics (Done)     Description:   Instruct learner(s) on proper positioning and spine alignment during self-care, functional mobility activities and/or exercises.              Learning Progress Summary           Patient Acceptance, E,TB, VU by LW at 3/10/2021 1259    Acceptance, E, VU by ME at 3/8/2021 1114    Comment: Educated on OT and POC. Educated to call for assistance. Educated on safety precautions. Educated on discharge recommendations.                               User Key     Initials Effective Dates Name Provider Type Discipline    LW 03/07/18 -  Prabha Garcia COTA/L Occupational Therapy Assistant OT    AS 07/05/20 -  Lucy Fernandes OT Occupational Therapist OT    ME 08/24/20 -  Latonia See OTR/L Occupational Therapist OT              OT Recommendation and Plan     Plan of Care Review  Plan of Care Reviewed With: patient  Progress: improving  Outcome Summary: Pt sup to sit with SBA. Sit to stand SBA. T/f to bschair CGA. Pt completed UB and LB bathing and dressing with SBA. Pt completed grooming with SBA. Pt reclined in bschair all needs in reach. Nsg aware.     Time Calculation:   Time Calculation- OT     Row Name 03/10/21 1300             Time Calculation- OT    OT Start Time  0830  -LW      OT Stop Time  0940  -LW      OT Time Calculation (min)  70 min  -LW      Total  Timed Code Minutes- OT  70 minute(s)  -      OT Received On  03/10/21  -        User Key  (r) = Recorded By, (t) = Taken By, (c) = Cosigned By    Initials Name Provider Type    Prabha Hernandez COTA/L Occupational Therapy Assistant        Therapy Charges for Today     Code Description Service Date Service Provider Modifiers Qty    00885798613 HC OT SELF CARE/MGMT/TRAIN EA 15 MIN 3/10/2021 Prabha Garcia COTA/CHIQUIS GO 5               SHASTA Rubio  3/10/2021

## 2021-03-10 NOTE — PROGRESS NOTES
[facility] Cardiology  INPATIENT PROGRESS NOTE    Name: Josi Roe  Age/Sex: 75 y.o. female  :  1945        PCP: Debra Wilkes MD    Vital Signs  Temp:  [97.5 °F (36.4 °C)-98.4 °F (36.9 °C)] 97.5 °F (36.4 °C)  Heart Rate:  [49-86] 59  Resp:  [16-18] 16  BP: (138-212)/() 146/67  Body mass index is 32.16 kg/m².      Subjective   No acute issues overnight.  Denies any chest pain shortness of breath or lightheadedness.    Patient Active Problem List   Diagnosis   • Cerebrovascular accident (CVA) (CMS/HCC)       Past Medical History:   Diagnosis Date   • Heart murmur    • Hypertension        Current Facility-Administered Medications   Medication Dose Route Frequency Provider Last Rate Last Admin   • acetaminophen (TYLENOL) tablet 650 mg  650 mg Oral Q4H PRN Christopher Alvarez MD        Or   • acetaminophen (TYLENOL) 160 MG/5ML solution 650 mg  650 mg Oral Q4H PRN Christopher Alvarez MD        Or   • acetaminophen (TYLENOL) suppository 650 mg  650 mg Rectal Q4H PRN Christopher Alvarez MD       • amLODIPine (NORVASC) tablet 5 mg  5 mg Oral Q24H Aly Avelar MD   5 mg at 03/10/21 1248   • aspirin tablet 325 mg  325 mg Oral Daily Bandar De Guzman APRN   325 mg at 03/10/21 0824   • atorvastatin (LIPITOR) tablet 80 mg  80 mg Oral Daily Christopher Alvarez MD   80 mg at 03/10/21 0824   • clopidogrel (PLAVIX) tablet 75 mg  75 mg Oral Daily Bandar De Guzman APRN   75 mg at 03/10/21 0824   • enalaprilat (VASOTEC) injection 1.25 mg  1.25 mg Intravenous Q6H PRN Christopher Alvarez MD   1.25 mg at 03/10/21 0203   • enoxaparin (LOVENOX) syringe 40 mg  40 mg Subcutaneous Q24H Christopher Alvarez MD   40 mg at 21 1625   • influenza vac split quad (FLUZONE,FLUARIX,AFLURIA,FLULAVAL) injection 0.5 mL  0.5 mL Intramuscular During Hospitalization Christopher Alvarez MD       • losartan (COZAAR) tablet 50 mg  50 mg Oral BID Aly Avelar MD       • ondansetron (ZOFRAN) tablet  4 mg  4 mg Oral Q6H PRN Christopher Alvarez MD        Or   • ondansetron (ZOFRAN) injection 4 mg  4 mg Intravenous Q6H PRN Christopher Alvarez MD       • pantoprazole (PROTONIX) EC tablet 40 mg  40 mg Oral Q AM Christopher Alvarez MD   40 mg at 03/10/21 0511   • sodium chloride 0.9 % flush 10 mL  10 mL Intravenous PRN Christopher Alvarez MD       • sodium chloride 0.9 % flush 10 mL  10 mL Intravenous Q12H Christopher Alvarez MD   10 mL at 03/10/21 0824   • sodium chloride 0.9 % flush 10 mL  10 mL Intravenous PRN Christopher Alvarez MD   10 mL at 03/09/21 1631       Past Surgical History:   Procedure Laterality Date   • CHOLECYSTECTOMY     • CLUB FOOT RELEASE     • HYSTERECTOMY         Social History     Socioeconomic History   • Marital status:      Spouse name: Not on file   • Number of children: Not on file   • Years of education: Not on file   • Highest education level: Not on file   Tobacco Use   • Smoking status: Never Smoker   • Smokeless tobacco: Never Used   Substance and Sexual Activity   • Alcohol use: Never   • Drug use: Never   • Sexual activity: Defer       O/E    Neck: Supple.  No JVD, no thyroid enlargement.  Chest: Air entry equal, normal respiration.  No rhonchi or creps.  Cardiovascular system:  Regular rate and rhythm, no murmurs.  Abdomen: Soft, no tenderness, bowel sounds present, no hepatosplenomegaly.  CNS: Alert, oriented to place and time.  No motor or sensory deficit.  Cranial nerves intact.  Musculoskeletal: No deformity of the back or spine.  Extremities:  No edema.  Pulses equal on both sides.      Lab Results (last 24 hours)     ** No results found for the last 24 hours. **            A/P    This is a 75-year-old lady with no significant past medical history, reported that she has hypertension which she takes on her clonidine only on as needed basis who presented to the hospital with the acute pontine stroke.  We were consulted for further evaluation for bradycardia.  On my visit  patient lying comfortably in bed without acute distress.  She has still has some weakness on the right side of the face but otherwise no focal deficits.    1.  Sinus bradycardia: Resolved  Asymptomatic and hemodynamically stable.  She does have normal chronotropic competence, with exercise her heart rate up to 60s.  TSH was normal  Echocardiogram with hyperdynamic systolic function and grade 1 diastolic dysfunction, left atrial volume is slightly enlarged.  We will continue to monitor at this point.  Since that she is asymptomatic and has good chronotropic competence medication for pacemaker at this point.  Avoid any AV shar blocking agents  Optimal blood pressure control     She is being planned for heart monitor to rule out A. fib in the setting of her CVA     2.  Hypertension:  Better controlled on losartan 50 mg amlodipine 5 mg.    Thank you for the consult.  We will sign off at this point.  Patient will be followed in office and will arrange for heart monitor on discharge.  Please call with questions.      Patient's Body mass index is 32.16 kg/m². BMI is above normal parameters. Recommendations include: exercise counseling and nutrition counseling.      Josi Eddiejaylon Bhupinder  reports that she has never smoked. She has never used smokeless tobacco..            This document has been electronically signed by Elizabeth Poole MD on March 10, 2021 13:31 CST         Part of this note may be an electronic transcription/translation of spoken language to printed text using the Dragon Dictation System.

## 2021-03-10 NOTE — PLAN OF CARE
Goal Outcome Evaluation:  Plan of Care Reviewed With: patient  Progress: no change  Outcome Summary: BP remains elevated at times. PRN meds given. Pt on 2L NC while sleeping. NPO since MN. Will continue to monitor.

## 2021-03-10 NOTE — PLAN OF CARE
Problem: Adult Inpatient Plan of Care  Goal: Plan of Care Review  Outcome: Ongoing, Progressing  Flowsheets  Taken 3/10/2021 1259  Progress: improving  Plan of Care Reviewed With: patient  Taken 3/10/2021 0830  Plan of Care Reviewed With: patient  Outcome Summary: Pt sup to sit with SBA. Sit to stand SBA. T/f to bschair CGA. Pt completed UB and LB bathing and dressing with SBA. Pt completed grooming with SBA. Pt reclined in bschair all needs in reach. Nsg aware.   Goal Outcome Evaluation:  Plan of Care Reviewed With: patient  Progress: improving  Outcome Summary: Pt sup to sit with SBA. Sit to stand SBA. T/f to bschair CGA. Pt completed UB and LB bathing and dressing with SBA. Pt completed grooming with SBA. Pt reclined in bschair all needs in reach. Nsg aware.

## 2021-03-10 NOTE — PLAN OF CARE
Goal Outcome Evaluation:  Plan of Care Reviewed With: patient  Progress: improving  Outcome Summary: Pt. transferred sit-stand-sit CGA/Donna v.c.'s for hand placement, amb. 20'x2 with seated rest break during performance, performed x20 reps seated therex. Pt. with slight R knee buckle with gt. Pt. would benefit from inpatient rehab/ or SNF prior d/c home due to lives alone at this time.

## 2021-03-10 NOTE — PLAN OF CARE
Goal Outcome Evaluation:  Plan of Care Reviewed With: patient  Progress: no change  Outcome Summary: pt transferred and oriented to 3E, vss, family member at bedside

## 2021-03-10 NOTE — THERAPY TREATMENT NOTE
Acute Care - Physical Therapy Treatment Note  [facility]     Patient Name: Josi Roe  : 1945  MRN: [id number]  Today's Date: 3/10/2021           PT Assessment (last 12 hours)      PT Evaluation and Treatment     Row Name 03/10/21 1323          Physical Therapy Time and Intention    Subjective Information  no complaints  -     Document Type  therapy note (daily note)  -JOSÉ MIGUEL     Mode of Treatment  individual therapy;physical therapy  -     Patient Effort  good  -     Row Name 03/10/21 1323          General Information    Patient Profile Reviewed  yes  -JOSÉ MIGUEL     Existing Precautions/Restrictions  fall  -     Row Name 03/10/21 1323          Cognition    Orientation Status (Cognition)  oriented x 4  -     Row Name 03/10/21 1323          Pain Scale: Numbers Pre/Post-Treatment    Pretreatment Pain Rating  0/10 - no pain  -     Posttreatment Pain Rating  0/10 - no pain  -     Row Name 03/10/21 1323          Transfers    Transfers  sit-stand transfer;stand-sit transfer  -     Sit-Stand [place] (Transfers)  contact guard;verbal cues  -     Stand-Sit [place] (Transfers)  contact guard;verbal cues  -     Row Name 03/10/21 1323          Sit-Stand Transfer    Assistive Device (Sit-Stand Transfers)  walker, front-wheeled  -     Row Name 03/10/21 1323          Stand-Sit Transfer    Assistive Device (Stand-Sit Transfers)  walker, front-wheeled  -[facility] Name 03/10/21 1323          Gait/Stairs (Locomotion)    [place] Level (Gait)  contact guard;minimum assist (75% patient effort)  -JOSÉ MIGUEL     Assistive Device (Gait)  walker, front-wheeled  -     Distance in Feet (Gait)  20'x2  -JOSÉ MIGUEL     Pattern (Gait)  step-to  -JA     Deviations/Abnormal Patterns (Gait)  ataxic;base of support, wide;gait speed decreased  -JOSÉ MIGUEL     Right Sided Gait Deviations  knee buckling, right side slight   -JOSÉ MIGUEL     Comment (Gait/Stairs)  improved AD proximity this p.m.   -     Row Name 03/10/21 1325           Safety Issues, Functional Mobility    Impairments Affecting Function (Mobility)  endurance/activity tolerance;balance;coordination  -     Row Name 03/10/21 1323          Hip (Therapeutic Exercise)    Hip AROM (Therapeutic Exercise)  bilateral;flexion x20  -[facility] Name 03/10/21 1323          Knee (Therapeutic Exercise)    Knee AROM (Therapeutic Exercise)  bilateral;LAQ (long arc quad) x20  -     Row Name 03/10/21 1323          Ankle (Therapeutic Exercise)    Ankle AROM (Therapeutic Exercise)  bilateral;dorsiflexion;plantarflexion x20  -[facility] Name 03/10/21 1323          Vital Signs    Pre Systolic BP Rehab  175  -JA     Pre Treatment Diastolic BP  72  -JA     Post Systolic BP Rehab  160  -JA     Post Treatment Diastolic BP  72  -JA     Pretreatment Heart Rate (beats/min)  84  -JA     Posttreatment Heart Rate (beats/min)  93  -JA     Pre Patient Position  Sitting  -JA     Intra Patient Position  Standing  -JA     Post Patient Position  Sitting  -[facility] Name 03/10/21 1323          Bed Mobility Goal 1 (PT)    Activity/Assistive Device (Bed Mobility Goal 1, PT)  bed mobility activities, all  -     [place] Level/Cues Needed (Bed Mobility Goal 1, PT)  independent;modified independence  -     Time Frame (Bed Mobility Goal 1, PT)  by discharge  -     Progress/Outcomes (Bed Mobility Goal 1, PT)  goal not met  -[facility] Name 03/10/21 1323          Transfer Goal 1 (PT)    Activity/Assistive Device (Transfer Goal 1, PT)  bed-to-chair/chair-to-bed  -     [place] Level/Cues Needed (Transfer Goal 1, PT)  independent;modified independence  -     Time Frame (Transfer Goal 1, PT)  by discharge  -     Progress/Outcome (Transfer Goal 1, PT)  goal not met  -[facility] Name 03/10/21 1323          Gait Training Goal 1 (PT)    Activity/Assistive Device (Gait Training Goal 1, PT)  gait (walking locomotion);assistive device use  -     [place] Level (Gait Training Goal 1, PT)  modified  independence  -JA     Distance (Gait Training Goal 1, PT)  150 ft or more  -JA     Time Frame (Gait Training Goal 1, PT)  by discharge  -     Row Name 03/10/21 1323          Stairs Goal 1 (PT)    Activity/Assistive Device (Stairs Goal 1, PT)  ascending stairs;descending stairs  -JA     [place] Level/Cues Needed (Stairs Goal 1, PT)  modified independence;independent  -JA     Number of Stairs (Stairs Goal 1, PT)  2 or more  -JA     Time Frame (Stairs Goal 1, PT)  by discharge  -     Progress/Outcome (Stairs Goal 1, PT)  goal not met  -     Row Name 03/10/21 1323          Patient Education Goal (PT)    Activity (Patient Education Goal, PT)  pt will be skilled w/ use of FWRW for safety  -     [place]/Cues/Accuracy (Memory Goal 2, PT)  demonstrates adequately  -JA     Time Frame (Patient Education Goal, PT)  by discharge  -     Progress/Outcome (Patient Education Goal, PT)  goal not met  -     Row Name 03/10/21 1323          Positioning and Restraints    Pre-Treatment Position  sitting in chair/recliner  -     Post Treatment Position  chair  -JA     In Chair  sitting;call light within reach;encouraged to call for assist;reclined;notified ns  -     Row Name 03/10/21 1323          Progress Summary (PT)    Progress Toward Functional Goals (PT)  progress toward functional goals as expected  -       User Key  (r) = Recorded By, (t) = Taken By, (c) = Cosigned By    Initials Name Provider Type    Yong Whitney PTA Physical Therapy Assistant        Physical Therapy Education                 Title: PT OT SLP Therapies (Done)     Topic: Physical Therapy (Done)     Point: Mobility training (Done)     Learning Progress Summary           Patient Acceptance, D,E, VU,NR by VASHTI at 3/8/2021 3751    Comment: POC, need for more control in use of FWRW, rec assist at home if d/c home; may benefit from skilled care before home                   Point: Home exercise program (Done)     Learning Progress  Summary           Patient Acceptance, D,E, VU,NR by  at 3/8/2021 1258    Comment: POC, need for more control in use of FWRW, rec assist at home if d/c home; may benefit from skilled care before home                   Point: Body mechanics (Done)     Learning Progress Summary           Patient Acceptance, D,E, VU,NR by  at 3/8/2021 1258    Comment: POC, need for more control in use of FWRW, rec assist at home if d/c home; may benefit from skilled care before home                   Point: Precautions (Done)     Learning Progress Summary           Patient Acceptance, D,E, VU,NR by  at 3/8/2021 1258    Comment: POC, need for more control in use of FWRW, rec assist at home if d/c home; may benefit from skilled care before home                               User Key     Initials Effective Dates Name Provider Type Zenon     04/03/18 -  Mounika Anand, PT Physical Therapist PT              PT Recommendation and Plan  Anticipated Discharge Disposition (PT): inpatient rehabilitation facility, skilled nursing facility  Progress Summary (PT)  Progress Toward Functional Goals (PT): progress toward functional goals as expected  Plan of Care Reviewed With: patient  Progress: improving  Outcome Summary: Pt. transferred sit-stand-sit CGA/Donna v.c.'s for hand placement, amb. 20'x2 with seated rest break during performance, performed x20 reps seated therex. Pt. with slight R knee buckle with gt. Pt. would benefit from inpatient rehab/ or SNF prior d/c home due to lives alone at this time.       Time Calculation:   PT Charges     Row Name 03/10/21 1524             Time Calculation    Start Time  1323  -JA      Stop Time  1350  -JA      Time Calculation (min)  27 min  -JA         Time Calculation- PT    Total Timed Code Minutes- PT  27 minute(s)  -JA        User Key  (r) = Recorded By, (t) = Taken By, (c) = Cosigned By    Initials Name Provider Type    Yong Whitney, PTA Physical Therapy Assistant         Therapy Charges for Today     Code Description Service Date Service Provider Modifiers Qty    85375806544  PT THERAPEUTIC ACT EA 15 MIN 3/9/2021 Yong Brizuela, PTA GP 1    55762572781 HC PT THER PROC EA 15 MIN 3/9/2021 Yong Brizuela, PTA GP 1    58898380021 HC GAIT TRAINING EA 15 MIN 3/10/2021 Yong Brizuela, PTA GP 1    09012822205 HC PT THER PROC EA 15 MIN 3/10/2021 Yong Brizuela, PTA GP 1          PT G-Codes  Outcome Measure Options: 9 Hole Peg  AM-PAC 6 Clicks Score (PT): 18  AM-PAC 6 Clicks Score (OT): 19  Modified Edwin Scale: 4 - Moderately severe disability.  Unable to walk without assistance, and unable to attend to own bodily needs without assistance.    Yong Brizuela PTA  3/10/2021

## 2021-03-10 NOTE — PROGRESS NOTES
"    [facility] Medicine Services  INPATIENT PROGRESS NOTE    Length of Stay: 3  Date of Admission: 3/7/2021  Primary Care Physician: Debra Wilkes MD    Subjective   Chief Complaint: Patient without complaints today    HPI: Briefly this is a 75-year-old female who presented admitted to the ED on 3/7.  Problem list    1.  History of hypertension  2.  Sensorineural hearing loss of right ear nuclear patient     presenting with \"balance issues\" with right-sided weakness and slurred speech.  On triage repeat blood pressure was 246/113 with noncontrast CT scan revealed an old left basal cannula and caudate nucleus infarcts and old lacunar infarct in the right thalamus.  MRI of the brain showed acute 1.5 cm infarct in the left side of the renny.  She was started on teleneurology and started on dual antiplatelet therapy and high-dose statin.  Patient was started on a Cardene drip.  Patient had PT OT as well as speech therapy within the first 24 hours patient.  Patient's hospital course also complicated by some nocardia with heart rate fluctuating but improving with heart rate stable in the 30s..  The consult was on the first hospital day on 3/8.  Neurology signed off the patient today on 3/10 and she is transferred to the floor.    Review of Systems   Constitutional: Negative.    HENT: Negative.    Eyes: Negative.    Respiratory: Negative.    Cardiovascular: Negative.    Gastrointestinal: Negative.    Endocrine: Negative.    Genitourinary: Negative.    Musculoskeletal: Positive for back pain.   Skin: Negative.    Allergic/Immunologic: Negative.    Neurological: Positive for dizziness.   Hematological: Negative.    Psychiatric/Behavioral: Negative.         All pertinent negatives and positives are as above. All other systems have been reviewed and are negative unless otherwise stated.     Objective    Temp:  [97.5 °F (36.4 °C)-98.4 °F (36.9 °C)] 97.5 °F (36.4 °C)  Heart Rate:  " [49-86] 85  Resp:  [16-18] 16  BP: (138-212)/() 160/72    Physical Exam  Constitutional:       Appearance: Normal appearance. She is obese.   HENT:      Head: Normocephalic and atraumatic.      Mouth/Throat:      Mouth: Mucous membranes are dry.   Eyes:      Pupils: Pupils are equal, round, and reactive to light.   Cardiovascular:      Rate and Rhythm: Regular rhythm.   Pulmonary:      Effort: Pulmonary effort is normal.   Abdominal:      General: Abdomen is flat. Bowel sounds are normal.   Musculoskeletal:         General: Normal range of motion.      Cervical back: Normal range of motion.   Skin:     General: Skin is warm and dry.      Capillary Refill: Capillary refill takes less than 2 seconds.   Neurological:      Mental Status: She is alert.      Comments: Right-sided weakness             Results Review:  I have reviewed the labs, radiology results, and diagnostic studies.    Laboratory Data:   Results from last 7 days   Lab Units 03/08/21  0311 03/07/21  0932   SODIUM mmol/L 141 140   POTASSIUM mmol/L 3.8 4.2   CHLORIDE mmol/L 110* 106   CO2 mmol/L 24.0 27.0   BUN mg/dL 20 30*   CREATININE mg/dL 0.79 0.84   GLUCOSE mg/dL 77 75   CALCIUM mg/dL 11.2* 11.9*   BILIRUBIN mg/dL  --  0.5   ALK PHOS U/L  --  58   ALT (SGPT) U/L  --  20   AST (SGOT) U/L  --  14   ANION GAP mmol/L 7.0 7.0     Estimated Creatinine Clearance: 58.9 mL/min (by C-G formula based on SCr of 0.79 mg/dL).          Results from last 7 days   Lab Units 03/08/21  0311 03/07/21  0932   WBC 10*3/mm3 9.24 11.18*   HEMOGLOBIN g/dL 14.3 14.1   HEMATOCRIT % 43.0 41.4   PLATELETS 10*3/mm3 237 278           Culture Data:   No results found for: BLOODCX  No results found for: URINECX  No results found for: RESPCX  No results found for: WOUNDCX  No results found for: STOOLCX  No components found for: BODYFLD    Radiology Data:   Imaging Results (Last 24 Hours)     ** No results found for the last 24 hours. **          I have reviewed the patient's  current medications.     Assessment/Plan     Active Hospital Problems    Diagnosis    • Cerebrovascular accident (CVA) (CMS/[facility])      Impression    1.  Status post acute CVA  -Awaiting transfer to rehab facility  -PT OT continues  -Patient with bilateral strength equal now  -On dual antiplatelet agent as well as statin  -Appreciate neurology consultation note signed off today    2.  Hypertension  -Currently on amlodipine (initiated today)  -Change Cozaar to 50 p.o. twice daily  -Get rid of the 3 time a day hydralazine  -Allowing some permissive with plans to get to goal of the next month    3.  Bradycardia  -Follow-up overall clinically  -Appreciate Dr. Poole following    CODE STATUS patient is a full code    Overall patient seems to be improving await transfer to rehab facility patient transferred out of the ICU back to the regular floor today        lAy Avelar MD

## 2021-03-11 PROCEDURE — 97530 THERAPEUTIC ACTIVITIES: CPT

## 2021-03-11 PROCEDURE — 97110 THERAPEUTIC EXERCISES: CPT

## 2021-03-11 PROCEDURE — 25010000002 ENOXAPARIN PER 10 MG: Performed by: INTERNAL MEDICINE

## 2021-03-11 PROCEDURE — 97116 GAIT TRAINING THERAPY: CPT

## 2021-03-11 PROCEDURE — 97535 SELF CARE MNGMENT TRAINING: CPT

## 2021-03-11 RX ORDER — AMLODIPINE BESYLATE 10 MG/1
10 TABLET ORAL
Status: DISCONTINUED | OUTPATIENT
Start: 2021-03-12 | End: 2021-03-12 | Stop reason: HOSPADM

## 2021-03-11 RX ADMIN — ASPIRIN 325 MG: 325 TABLET ORAL at 08:25

## 2021-03-11 RX ADMIN — LOSARTAN POTASSIUM 50 MG: 50 TABLET, FILM COATED ORAL at 08:25

## 2021-03-11 RX ADMIN — CLOPIDOGREL BISULFATE 75 MG: 75 TABLET ORAL at 08:25

## 2021-03-11 RX ADMIN — SODIUM CHLORIDE, PRESERVATIVE FREE 10 ML: 5 INJECTION INTRAVENOUS at 20:58

## 2021-03-11 RX ADMIN — PANTOPRAZOLE SODIUM 40 MG: 40 TABLET, DELAYED RELEASE ORAL at 05:14

## 2021-03-11 RX ADMIN — LOSARTAN POTASSIUM 50 MG: 50 TABLET, FILM COATED ORAL at 20:58

## 2021-03-11 RX ADMIN — ATORVASTATIN CALCIUM 80 MG: 40 TABLET, FILM COATED ORAL at 08:25

## 2021-03-11 RX ADMIN — AMLODIPINE BESYLATE 5 MG: 5 TABLET ORAL at 08:25

## 2021-03-11 RX ADMIN — SODIUM CHLORIDE, PRESERVATIVE FREE 10 ML: 5 INJECTION INTRAVENOUS at 08:26

## 2021-03-11 RX ADMIN — ENOXAPARIN SODIUM 40 MG: 40 INJECTION SUBCUTANEOUS at 16:50

## 2021-03-11 NOTE — PLAN OF CARE
Goal Outcome Evaluation:  Plan of Care Reviewed With: patient  Progress: improving  Outcome Summary: Pt. incontinent of bladder, but good effort with gt & mobility. Pt. transferred sup-sit SBA, sit-stand-sit CGA with RW, amb. 40' with RW CGA occasional v.c.'s for AD proximity especially with turns, pt. transferred bed-chair-bed CGA, sit-sup SBA. Cont. to mobilize

## 2021-03-11 NOTE — PLAN OF CARE
Goal Outcome Evaluation:  Plan of Care Reviewed With: patient  Progress: improving  Outcome Summary: Pt tolerated tx well this date. Pt was SBA with bed mobility. Pt completed an ADL. Pt gave good effort with UE ther ex. Continue OT POC.

## 2021-03-11 NOTE — PLAN OF CARE
Goal Outcome Evaluation:     Progress: improving  Outcome Summary: Patient alert and oriented this morning.  No c/o pain or discomfort this shift.  VSS.  Will continue to monitor.

## 2021-03-11 NOTE — PROGRESS NOTES
[facility] Medicine Services  INPATIENT PROGRESS NOTE    Length of Stay: 4  Date of Admission: 3/7/2021  Primary Care Physician: Debra Wilkes MD    Subjective   Chief Complaint: Right arm/leg weakness and slurred speech    HPI: Patient states that her right arm and leg weakness is improved today.*She denies headaches.  She is working with physical therapy and Occupational Therapy as well as speech therapy.    Review of Systems   Constitutional: Negative for activity change, appetite change, chills, fatigue and fever.   HENT: Negative for congestion, ear pain, rhinorrhea, sore throat and trouble swallowing.    Respiratory: Negative for cough, chest tightness, shortness of breath and wheezing.    Cardiovascular: Negative for chest pain, palpitations and leg swelling.   Gastrointestinal: Negative for abdominal distention, abdominal pain, diarrhea, nausea and vomiting.   Genitourinary: Negative for difficulty urinating, dysuria and hematuria.   Musculoskeletal: Negative for arthralgias, back pain and myalgias.   Skin: Negative for pallor and rash.   Neurological: Positive for weakness. Negative for dizziness, syncope, light-headedness and headaches.   Hematological: Negative for adenopathy. Does not bruise/bleed easily.   Psychiatric/Behavioral: Negative for agitation and confusion. The patient is not nervous/anxious.      Objective    Temp:  [96.8 °F (36 °C)-98.1 °F (36.7 °C)] 98.1 °F (36.7 °C)  Heart Rate:  [53-86] 59  Resp:  [18] 18  BP: (148-172)/(64-78) 151/74    Physical Exam  Constitutional:       General: She is not in acute distress.     Appearance: Normal appearance. She is not ill-appearing or diaphoretic.   HENT:      Head: Normocephalic and atraumatic.      Right Ear: External ear normal.      Left Ear: External ear normal.      Nose: No congestion or rhinorrhea.      Mouth/Throat:      Mouth: Mucous membranes are moist.      Pharynx: No oropharyngeal  exudate or posterior oropharyngeal erythema.   Eyes:      General: No scleral icterus.     Extraocular Movements: Extraocular movements intact.      Conjunctiva/sclera: Conjunctivae normal.   Cardiovascular:      Rate and Rhythm: Normal rate and regular rhythm.      Heart sounds: Normal heart sounds. No murmur.   Pulmonary:      Effort: Pulmonary effort is normal. No respiratory distress.      Breath sounds: Normal breath sounds. No wheezing, rhonchi or rales.   Abdominal:      General: Abdomen is flat. There is no distension.      Palpations: Abdomen is soft.      Tenderness: There is no abdominal tenderness. There is no guarding.   Musculoskeletal:         General: No swelling, tenderness or deformity.      Cervical back: Neck supple. No rigidity. No muscular tenderness.      Right lower leg: No edema.      Left lower leg: No edema.   Lymphadenopathy:      Cervical: No cervical adenopathy.   Skin:     General: Skin is warm and dry.   Neurological:      General: No focal deficit present.      Mental Status: She is alert and oriented to person, place, and time.      Cranial Nerves: No cranial nerve deficit.      Motor: Weakness present.      Comments: Power in right upper and lower limb grade 4/5.  Power in left upper and lower limbs graded 5/5.  No facial droop noted.   Psychiatric:         Mood and Affect: Mood normal.         Behavior: Behavior normal.         Thought Content: Thought content normal.         Medication Review:    Current Facility-Administered Medications:   •  acetaminophen (TYLENOL) tablet 650 mg, 650 mg, Oral, Q4H PRN **OR** acetaminophen (TYLENOL) 160 MG/5ML solution 650 mg, 650 mg, Oral, Q4H PRN **OR** acetaminophen (TYLENOL) suppository 650 mg, 650 mg, Rectal, Q4H PRN, Christopher Alvarez MD  •  [START ON 3/12/2021] amLODIPine (NORVASC) tablet 10 mg, 10 mg, Oral, Q24H, Rosemary Carlin MD  •  aspirin tablet 325 mg, 325 mg, Oral, Daily, Bandar De Guzman APRN, 325 mg at 03/11/21 0808  •   atorvastatin (LIPITOR) tablet 80 mg, 80 mg, Oral, Daily, Christopher Alvarez MD, 80 mg at 03/11/21 0825  •  clopidogrel (PLAVIX) tablet 75 mg, 75 mg, Oral, Daily, Bandar De Guzman APRN, 75 mg at 03/11/21 0825  •  enalaprilat (VASOTEC) injection 1.25 mg, 1.25 mg, Intravenous, Q6H PRN, Christopher Alvarez MD, 1.25 mg at 03/10/21 2329  •  enoxaparin (LOVENOX) syringe 40 mg, 40 mg, Subcutaneous, Q24H, Christopher Alvarez MD, 40 mg at 03/10/21 1547  •  influenza vac split quad (FLUZONE,FLUARIX,AFLURIA,FLULAVAL) injection 0.5 mL, 0.5 mL, Intramuscular, During Hospitalization, Christopher Alvarez MD  •  losartan (COZAAR) tablet 50 mg, 50 mg, Oral, BID, Aly Avelar MD, 50 mg at 03/11/21 0825  •  ondansetron (ZOFRAN) tablet 4 mg, 4 mg, Oral, Q6H PRN **OR** ondansetron (ZOFRAN) injection 4 mg, 4 mg, Intravenous, Q6H PRN, Christopher Alvarez MD  •  pantoprazole (PROTONIX) EC tablet 40 mg, 40 mg, Oral, Q AM, Christopher Alvarez MD, 40 mg at 03/11/21 0514  •  sodium chloride 0.9 % flush 10 mL, 10 mL, Intravenous, PRN, Christopher Alvarez MD  •  sodium chloride 0.9 % flush 10 mL, 10 mL, Intravenous, Q12H, Christopher Alvarez MD, 10 mL at 03/11/21 0826  •  sodium chloride 0.9 % flush 10 mL, 10 mL, Intravenous, PRN, Christopher Alvarez MD, 10 mL at 03/09/21 1631    I have reviewed the patient's current medications.     Results Review:  I have reviewed the labs, radiology results, and diagnostic studies.    Laboratory Data:   Results from last 7 days   Lab Units 03/08/21  0311 03/07/21  0932   SODIUM mmol/L 141 140   POTASSIUM mmol/L 3.8 4.2   CHLORIDE mmol/L 110* 106   CO2 mmol/L 24.0 27.0   BUN mg/dL 20 30*   CREATININE mg/dL 0.79 0.84   GLUCOSE mg/dL 77 75   CALCIUM mg/dL 11.2* 11.9*   BILIRUBIN mg/dL  --  0.5   ALK PHOS U/L  --  58   ALT (SGPT) U/L  --  20   AST (SGOT) U/L  --  14   ANION GAP mmol/L 7.0 7.0     Estimated Creatinine Clearance: 60.1 mL/min (by C-G formula based on SCr of 0.79 mg/dL).          Results from  last 7 days   Lab Units 03/08/21  0311 03/07/21  0932   WBC 10*3/mm3 9.24 11.18*   HEMOGLOBIN g/dL 14.3 14.1   HEMATOCRIT % 43.0 41.4   PLATELETS 10*3/mm3 237 278           Culture Data:   No results found for: BLOODCX  No results found for: URINECX  No results found for: RESPCX  No results found for: WOUNDCX  No results found for: STOOLCX  No components found for: BODYFLD    Radiology Data:   Imaging Results (Last 24 Hours)     ** No results found for the last 24 hours. **          Assessment/Plan     Hospital Problem List:  Active Problems:    Cerebrovascular accident (CVA) (CMS/[facility])  Essential hypertension  Sinus bradycardia    Plan  -Continue dual antiplatelet therapy and statins  -Neurology consultation input appreciated.  -Continue PT/OT and speech therapy  -Plan for rehab facility placement  -Increase p.o. amlodipine to 10 mg daily for essential hypertension.  Continue p.o. Cozaar 50 mg twice daily  -Bradycardia is improving.  Patient had good chronotropic competence with increasing heart rate during exercise.  TSH was normal. Will avoid AV shar blocking agents  -Patient is planned for heart monitor placement after discharge at cardiologist office to monitor for occult A. fib  -DVT prophylaxis with subcutaneous Lovenox  -CODE STATUS is full code    Discharge Planning: Acute rehab placement pending    Rosemary Carlin MD   03/11/21   13:17 CST

## 2021-03-11 NOTE — THERAPY TREATMENT NOTE
Patient Name: Josi Roe  : 1945    MRN: [id number]                              Today's Date: 3/11/2021       Admit Date: 3/7/2021    Visit Dx:     ICD-10-CM ICD-9-CM   1. Cerebrovascular accident (CVA) due to thrombosis of cerebral artery (CMS/HCC)  I63.30 434.01   2. Cerebrovascular accident (CVA), unspecified mechanism (CMS/HCC)  I63.9 434.91   3. Hypertensive emergency  I16.1 401.9   4. Impaired mobility and ADLs  Z74.09 V49.89    Z78.9    5. Impaired functional mobility, balance, gait, and endurance  Z74.09 V49.89   6. Other hereditary cerebrovascular disease   I67.858 437.8     Patient Active Problem List   Diagnosis   • Cerebrovascular accident (CVA) (CMS/HCC)     Past Medical History:   Diagnosis Date   • Heart murmur    • Hypertension      Past Surgical History:   Procedure Laterality Date   • CHOLECYSTECTOMY     • CLUB FOOT RELEASE     • HYSTERECTOMY       General Information     Row Name 21 0850          OT Time and Intention    Document Type  therapy note (daily note)  -CS     Mode of Treatment  occupational therapy  -CS     Row Name 21 0850          General Information    Patient Profile Reviewed  yes  -CS     Existing Precautions/Restrictions  fall  -CS     Row Name 21 0850          Cognition    Orientation Status (Cognition)  oriented x 4  -CS     Row Name 21 0850          Safety Issues, Functional Mobility    Impairments Affecting Function (Mobility)  endurance/activity tolerance;balance;coordination  -CS       User Key  (r) = Recorded By, (t) = Taken By, (c) = Cosigned By    Initials Name Provider Type    CS Keyla Rendon COTA/CHIQUIS Occupational Therapy Assistant          Mobility/ADL's     Row Name 21 1228          Bed Mobility    Bed Mobility  supine-sit;sit-supine  -CS     Supine-Sit [place] (Bed Mobility)  standby assist  -CS     Sit-Supine [place] (Bed Mobility)  standby assist  -CS     Assistive Device (Bed Mobility)  bed rails;draw  sheet;head of bed elevated  -     Row Name 03/11/21 1228          Transfers    Transfers  sit-stand transfer  -     Sit-Stand [place] (Transfers)  contact guard;verbal cues  -[facility] Name 03/11/21 1228          Sit-Stand Transfer    Assistive Device (Sit-Stand Transfers)  walker, front-wheeled  -     Row Name 03/11/21 1228          Activities of Daily Living    BADL Assessment/Intervention  bathing;upper body dressing;lower body dressing;grooming  -[facility] Name 03/11/21 1228          Lower Body Dressing Assessment/Training    [place] Level (Lower Body Dressing)  lower body dressing skills;doff;don;pants/bottoms;socks;standby assist;set up  -CS     Position (Lower Body Dressing)  edge of bed sitting  -     Row Name 03/11/21 1228          Upper Body Dressing Assessment/Training    [place] Level (Upper Body Dressing)  doff;don;set up HG  -CS     Position (Upper Body Dressing)  edge of bed sitting  -     Row Name 03/11/21 1228          Bathing Assessment/Intervention    [place] Level (Bathing)  lower body;upper body;standby assist;set up  -CS     Position (Bathing)  edge of bed sitting  -     Row Name 03/11/21 1228          Grooming Assessment/Training    [place] Level (Grooming)  hair care, combing/brushing;oral care regimen;wash face, hands;standby assist;set up  -CS     Position (Grooming)  edge of bed sitting  -       User Key  (r) = Recorded By, (t) = Taken By, (c) = Cosigned By    Initials Name Provider Type     Keyla Rendon COTA/CHIQUIS Occupational Therapy Assistant        Obj/Interventions     Row Name 03/11/21 0850          Shoulder (Therapeutic Exercise)    Shoulder (Therapeutic Exercise)  AROM (active range of motion)  -     Shoulder AROM (Therapeutic Exercise)  bilateral;flexion;extension;external rotation;internal rotation  -     Row Name 03/11/21 0850          Elbow/Forearm (Therapeutic Exercise)    Elbow/Forearm (Therapeutic Exercise)  AROM (active range  of motion)  -     Elbow/Forearm AROM (Therapeutic Exercise)  bilateral;flexion;extension;supination;pronation  -     Row Name 03/11/21 0850          Wrist (Therapeutic Exercise)    Wrist (Therapeutic Exercise)  AROM (active range of motion)  -     Wrist AROM (Therapeutic Exercise)  bilateral;flexion;extension  -     Row Name 03/11/21 0850          Hand (Therapeutic Exercise)    Hand (Therapeutic Exercise)  AROM (active range of motion)  -     Hand AROM/AAROM (Therapeutic Exercise)  bilateral;finger flexion;finger extension  -     Row Name 03/11/21 0850          Therapeutic Exercise    Therapeutic Exercise  shoulder;elbow/forearm;wrist;hand  -       User Key  (r) = Recorded By, (t) = Taken By, (c) = Cosigned By    Initials Name Provider Type    CS Keyla Rendon COTA/L Occupational Therapy Assistant        Goals/Plan     Row Name 03/11/21 0850          Transfer Goal 1 (OT)    Activity/Assistive Device (Transfer Goal 1, OT)  toilet  -CS     [place] Level/Cues Needed (Transfer Goal 1, OT)  supervision required  -CS     Time Frame (Transfer Goal 1, OT)  long term goal (LTG);by discharge  -CS     Progress/Outcome (Transfer Goal 1, OT)  goal not met  -     Row Name 03/11/21 0850          Bathing Goal 1 (OT)    Activity/Device (Bathing Goal 1, OT)  lower body bathing AD as needed  -CS     [place] Level/Cues Needed (Bathing Goal 1, OT)  supervision required  -CS     Time Frame (Bathing Goal 1, OT)  long term goal (LTG);by discharge  -CS     Progress/Outcomes (Bathing Goal 1, OT)  goal not met  -     Row Name 03/11/21 0850          Dressing Goal 1 (OT)    Activity/Device (Dressing Goal 1, OT)  lower body dressing AD as needed  -CS     [place]/Cues Needed (Dressing Goal 1, OT)  supervision required  -CS     Time Frame (Dressing Goal 1, OT)  long term goal (LTG);by discharge  -CS     Progress/Outcome (Dressing Goal 1, OT)  goal not met  -CS       User Key  (r) = Recorded By, (t) = Taken By,  (c) = Cosigned By    Initials Name Provider Type    Keyla Chambers COTA/CHIQUIS Occupational Therapy Assistant        Clinical Impression     Row Name 03/11/21 0850          Pain Scale: Numbers Pre/Post-Treatment    Pretreatment Pain Rating  0/10 - no pain  -CS     Posttreatment Pain Rating  0/10 - no pain  -CS     Row Name 03/11/21 0850          Plan of Care Review    Plan of Care Reviewed With  patient  -CS     Progress  improving  -CS     Outcome Summary  Pt tolerated tx well this date. Pt was SBA with bed mobility. Pt completed an ADL. Pt gave good effort with UE ther ex. Continue OT POC.  -CS     Row Name 03/11/21 0850          Therapy Assessment/Plan (OT)    Rehab Potential (OT)  good, to achieve stated therapy goals  -CS     Criteria for Skilled Therapeutic Interventions Met (OT)  yes;meets criteria;skilled treatment is necessary  -CS     Therapy Frequency (OT)  daily  -CS     Row Name 03/11/21 0850          Therapy Plan Review/Discharge Plan (OT)    Anticipated Discharge Disposition (OT)  home;home with 24/7 care  -CS     Row Name 03/11/21 0850          Vital Signs    Pre Patient Position  Supine  -CS     Intra Patient Position  Sitting  -CS     Post Patient Position  Supine  -CS     Row Name 03/11/21 0850          Positioning and Restraints    Pre-Treatment Position  in bed  -CS     Post Treatment Position  bed  -CS     In Bed  sitting;call light within reach;encouraged to call for assist;exit alarm on  -CS       User Key  (r) = Recorded By, (t) = Taken By, (c) = Cosigned By    Initials Name Provider Type    Keyla Chambers COTA/CHIQUIS Occupational Therapy Assistant        Outcome Measures    No documentation.       Occupational Therapy Education                 Title: PT OT SLP Therapies (Done)     Topic: Occupational Therapy (Done)     Point: ADL training (Done)     Description:   Instruct learner(s) on proper safety adaptation and remediation techniques during self care or transfers.   Instruct in  proper use of assistive devices.              Learning Progress Summary           Patient Acceptance, E,TB, VU by LW at 3/10/2021 1259    Acceptance, E, VU by AS at 3/9/2021 0933    Comment: ADL training, transfer training, safety edu                   Point: Home exercise program (Done)     Description:   Instruct learner(s) on appropriate technique for monitoring, assisting and/or progressing therapeutic exercises/activities.              Learning Progress Summary           Patient Acceptance, E,TB, VU by LW at 3/10/2021 1259                   Point: Precautions (Done)     Description:   Instruct learner(s) on prescribed precautions during self-care and functional transfers.              Learning Progress Summary           Patient Acceptance, E,TB, VU by LW at 3/10/2021 1259    Acceptance, E, VU by AS at 3/9/2021 0933    Comment: ADL training, transfer training, safety edu    Acceptance, E, VU by ME at 3/8/2021 1114    Comment: Educated on OT and POC. Educated to call for assistance. Educated on safety precautions. Educated on discharge recommendations.                   Point: Body mechanics (Done)     Description:   Instruct learner(s) on proper positioning and spine alignment during self-care, functional mobility activities and/or exercises.              Learning Progress Summary           Patient Acceptance, E,TB, VU by LW at 3/10/2021 1259    Acceptance, E, VU by ME at 3/8/2021 1114    Comment: Educated on OT and POC. Educated to call for assistance. Educated on safety precautions. Educated on discharge recommendations.                               User Key     Initials Effective Dates Name Provider Type Discipline    LW 03/07/18 -  Prabha Garcia COTA/L Occupational Therapy Assistant OT    AS 07/05/20 -  Lucy Fernandes OT Occupational Therapist OT    ME 08/24/20 -  Latonia See OTR/L Occupational Therapist OT              OT Recommendation and Plan  Therapy Frequency (OT): daily  Plan of Care  Review  Plan of Care Reviewed With: patient  Progress: improving  Outcome Summary: Pt tolerated tx well this date. Pt was SBA with bed mobility. Pt completed an ADL. Pt gave good effort with UE ther ex. Continue OT POC.     Time Calculation:   Time Calculation- OT     Row Name 03/11/21 1240             Time Calculation- OT    OT Start Time  0850  -CS      OT Stop Time  1005  -CS      OT Time Calculation (min)  75 min  -CS      Total Timed Code Minutes- OT  75 minute(s)  -CS      OT Received On  03/11/21  -CS        User Key  (r) = Recorded By, (t) = Taken By, (c) = Cosigned By    Initials Name Provider Type    CS Keyla Rendon RODRIGUEZ/CHIQUIS Occupational Therapy Assistant        Therapy Charges for Today     Code Description Service Date Service Provider Modifiers Qty    68763587543 HC OT SELF CARE/MGMT/TRAIN EA 15 MIN 3/11/2021 Keyla Rendon COTA/L GO 3    12641133665 HC OT THER PROC EA 15 MIN 3/11/2021 Keyla Rendon COTA/L GO 2               SHASTA Phillip  3/11/2021

## 2021-03-11 NOTE — THERAPY TREATMENT NOTE
Acute Care - Physical Therapy Treatment Note  [facility]     Patient Name: Josi Roe  : 1945  MRN: [id number]  Today's Date: 3/11/2021           PT Assessment (last 12 hours)      PT Evaluation and Treatment     [facility] Name 21 1040          Physical Therapy Time and Intention    Subjective Information  no complaints  -     Document Type  therapy note (daily note)  -     Mode of Treatment  individual therapy;physical therapy  -     Patient Effort  good  -[facility] Name 21 1040          General Information    Patient Profile Reviewed  yes  -     Existing Precautions/Restrictions  fall  -[facility] Name 21 1040          Cognition    Orientation Status (Cognition)  oriented x 4  -[facility] Name 21 1040          Pain Scale: Numbers Pre/Post-Treatment    Pretreatment Pain Rating  0/10 - no pain  -     Posttreatment Pain Rating  0/10 - no pain  -[facility] Name 21 1040          Bed Mobility    Supine-Sit [place] (Bed Mobility)  standby assist  -     Sit-Supine [place] (Bed Mobility)  standby assist  -[facility] Name 21 1040          Transfers    Transfers  sit-stand transfer;stand-sit transfer;bed-chair transfer  -     Bed-Chair [place] (Transfers)  contact guard  -     Chair-Bed [place] (Transfers)  contact guard  -     Assistive Device (Bed-Chair Transfers)  walker, front-wheeled  -     Sit-Stand [place] (Transfers)  contact guard;verbal cues  -     Stand-Sit [place] (Transfers)  contact guard;verbal cues  -[facility] Name 21 1040          Chair-Bed Transfer    Assistive Device (Chair-Bed Transfers)  walker, front-wheeled  -[facility] Name 21 1040          Sit-Stand Transfer    Assistive Device (Sit-Stand Transfers)  walker, front-wheeled  -[facility] Name 21 1040          Stand-Sit Transfer    Assistive Device (Stand-Sit Transfers)  walker, front-wheeled  -[facility] Name 21 1040           Gait/Stairs (Locomotion)    [place] Level (Gait)  minimum assist (75% patient effort)  -     Assistive Device (Gait)  walker, front-wheeled  -     Distance in Feet (Gait)  40'  -JA     Pattern (Gait)  step-to  -JA     Deviations/Abnormal Patterns (Gait)  ataxic;base of support, wide;gait speed decreased  -     Right Sided Gait Deviations  knee buckling, right side slight   -JA     Comment (Gait/Stairs)  Upon return to room pt. bed observed as soiled with urine, pt. transferred to bedside chair this a.m. while bed changed   -     Row Name 03/11/21 1040          Safety Issues, Functional Mobility    Impairments Affecting Function (Mobility)  endurance/activity tolerance;balance;coordination  -     Row Name 03/11/21 1040          Vital Signs    Pre Systolic BP Rehab  151  -     Pre Treatment Diastolic BP  74  -JA     Pretreatment Heart Rate (beats/min)  59  -JA     Pre Patient Position  Supine  -JA     Intra Patient Position  Standing  -JA     Post Patient Position  Supine  -     Row Name 03/11/21 1040          Bed Mobility Goal 1 (PT)    Activity/Assistive Device (Bed Mobility Goal 1, PT)  bed mobility activities, all  -     [place] Level/Cues Needed (Bed Mobility Goal 1, PT)  independent;modified independence  -     Time Frame (Bed Mobility Goal 1, PT)  by discharge  -     Progress/Outcomes (Bed Mobility Goal 1, PT)  goal not met  -[facility] Name 03/11/21 1040          Transfer Goal 1 (PT)    Activity/Assistive Device (Transfer Goal 1, PT)  bed-to-chair/chair-to-bed  -     [place] Level/Cues Needed (Transfer Goal 1, PT)  independent;modified independence  -     Time Frame (Transfer Goal 1, PT)  by discharge  -     Progress/Outcome (Transfer Goal 1, PT)  goal not met  -[facility] Name 03/11/21 1040          Gait Training Goal 1 (PT)    Activity/Assistive Device (Gait Training Goal 1, PT)  gait (walking locomotion);assistive device use  -     [place] Level (Gait Training  Goal 1, PT)  modified independence  -JA     Distance (Gait Training Goal 1, PT)  150 ft or more  -JA     Time Frame (Gait Training Goal 1, PT)  by discharge  -     Row Name 03/11/21 1040          Stairs Goal 1 (PT)    Activity/Assistive Device (Stairs Goal 1, PT)  ascending stairs;descending stairs  -JA     [place] Level/Cues Needed (Stairs Goal 1, PT)  modified independence;independent  -JA     Number of Stairs (Stairs Goal 1, PT)  2 or more  -JA     Time Frame (Stairs Goal 1, PT)  by discharge  -     Progress/Outcome (Stairs Goal 1, PT)  goal not met  -     Row Name 03/11/21 1040          Patient Education Goal (PT)    Activity (Patient Education Goal, PT)  pt will be skilled w/ use of FWRW for safety  -     [place]/Cues/Accuracy (Memory Goal 2, PT)  demonstrates adequately  -JA     Time Frame (Patient Education Goal, PT)  by discharge  -     Progress/Outcome (Patient Education Goal, PT)  goal not met  -     Row Name 03/11/21 1040          Positioning and Restraints    Pre-Treatment Position  in bed  -     Post Treatment Position  bed  -JA     In Bed  supine;call light within reach;encouraged to call for assist;exit alarm on  -     Row Name 03/11/21 1040          Progress Summary (PT)    Progress Toward Functional Goals (PT)  progress toward functional goals as expected  -       User Key  (r) = Recorded By, (t) = Taken By, (c) = Cosigned By    Initials Name Provider Type    Yong Whitney, GWEN Physical Therapy Assistant        Physical Therapy Education                 Title: PT OT SLP Therapies (Done)     Topic: Physical Therapy (Done)     Point: Mobility training (Done)     Learning Progress Summary           Patient Acceptance, D,E, VU,NR by VASHTI at 3/8/2021 3510    Comment: POC, need for more control in use of FWRW, rec assist at home if d/c home; may benefit from skilled care before home                   Point: Home exercise program (Done)     Learning Progress Summary            Patient Acceptance, D,E, VU,NR by VASHTI at 3/8/2021 1258    Comment: POC, need for more control in use of FWRW, rec assist at home if d/c home; may benefit from skilled care before home                   Point: Body mechanics (Done)     Learning Progress Summary           Patient Acceptance, D,E, VU,NR by VASHTI at 3/8/2021 1258    Comment: POC, need for more control in use of FWRW, rec assist at home if d/c home; may benefit from skilled care before home                   Point: Precautions (Done)     Learning Progress Summary           Patient Acceptance, D,E, VU,NR by VASHTI at 3/8/2021 1258    Comment: POC, need for more control in use of FWRW, rec assist at home if d/c home; may benefit from skilled care before home                               User Key     Initials Effective Dates Name Provider Type [place]     04/03/18 -  Mounika Anand, PT Physical Therapist PT              PT Recommendation and Plan  Anticipated Discharge Disposition (PT): inpatient rehabilitation facility, skilled nursing facility  Progress Summary (PT)  Progress Toward Functional Goals (PT): progress toward functional goals as expected  Plan of Care Reviewed With: patient  Progress: improving  Outcome Summary: Pt. incontinent of bladder, but good effort with gt & mobility. Pt. transferred sup-sit SBA, sit-stand-sit CGA with RW, amb. 40' with RW CGA occasional v.c.'s for AD proximity especially with turns, pt. transferred bed-chair-bed CGA, sit-sup SBA. Cont. to mobilize       Time Calculation:   PT Charges     Row Name 03/11/21 1245             Time Calculation    Start Time  1040  -JA      Stop Time  1118  -JOSÉ MIGUEL      Time Calculation (min)  38 min  -JOSÉ MIGUEL         Time Calculation- PT    Total Timed Code Minutes- PT  38 minute(s)  -JA        User Key  (r) = Recorded By, (t) = Taken By, (c) = Cosigned By    Initials Name Provider Type    Yong Whitney, PTA Physical Therapy Assistant        Therapy Charges for Today     Code  Description Service Date Service Provider Modifiers Qty    29895875582 HC GAIT TRAINING EA 15 MIN 3/10/2021 Yong Brizuela, PTA GP 1    93916635900 HC PT THER PROC EA 15 MIN 3/10/2021 Yong Brizuela, PTA GP 1    42032075541 HC GAIT TRAINING EA 15 MIN 3/11/2021 Yong Brizuela, PTA GP 1    79662398301 HC PT THERAPEUTIC ACT EA 15 MIN 3/11/2021 Yong Brizuela, PTA GP 2          PT G-Codes  Outcome Measure Options: 9 [place] Peg  AM-PAC 6 Clicks Score (PT): 18  AM-PAC 6 Clicks Score (OT): 19  Modified Edwin Scale: 4 - Moderately severe disability.  Unable to walk without assistance, and unable to attend to own bodily needs without assistance.    Yong Brizuela, GWEN  3/11/2021

## 2021-03-11 NOTE — PLAN OF CARE
Goal Outcome Evaluation:        Outcome Summary: BP elevated at times, medicated with PRN BP medication. Will continue to monitor.

## 2021-03-12 VITALS
RESPIRATION RATE: 18 BRPM | WEIGHT: 177.4 LBS | HEIGHT: 61 IN | OXYGEN SATURATION: 97 % | HEART RATE: 52 BPM | BODY MASS INDEX: 33.49 KG/M2 | DIASTOLIC BLOOD PRESSURE: 86 MMHG | SYSTOLIC BLOOD PRESSURE: 193 MMHG | TEMPERATURE: 98 F

## 2021-03-12 PROCEDURE — 97116 GAIT TRAINING THERAPY: CPT

## 2021-03-12 PROCEDURE — 97535 SELF CARE MNGMENT TRAINING: CPT

## 2021-03-12 PROCEDURE — 97110 THERAPEUTIC EXERCISES: CPT

## 2021-03-12 RX ORDER — CLOPIDOGREL BISULFATE 75 MG/1
75 TABLET ORAL DAILY
Qty: 90 TABLET | Refills: 0 | Status: SHIPPED | OUTPATIENT
Start: 2021-03-13 | End: 2021-06-11

## 2021-03-12 RX ORDER — AMLODIPINE BESYLATE 10 MG/1
10 TABLET ORAL
Qty: 90 TABLET | Refills: 0 | Status: SHIPPED | OUTPATIENT
Start: 2021-03-13 | End: 2021-06-11

## 2021-03-12 RX ORDER — ATORVASTATIN CALCIUM 80 MG/1
80 TABLET, FILM COATED ORAL DAILY
Qty: 90 TABLET | Refills: 0 | Status: SHIPPED | OUTPATIENT
Start: 2021-03-13 | End: 2021-06-11

## 2021-03-12 RX ORDER — LOSARTAN POTASSIUM 50 MG/1
50 TABLET ORAL 2 TIMES DAILY
Qty: 180 TABLET | Refills: 0 | Status: SHIPPED | OUTPATIENT
Start: 2021-03-12 | End: 2021-06-30

## 2021-03-12 RX ORDER — ASPIRIN 325 MG
325 TABLET ORAL DAILY
Qty: 90 TABLET | Refills: 0 | Status: SHIPPED | OUTPATIENT
Start: 2021-03-13 | End: 2021-06-11

## 2021-03-12 RX ADMIN — AMLODIPINE BESYLATE 10 MG: 10 TABLET ORAL at 08:29

## 2021-03-12 RX ADMIN — PANTOPRAZOLE SODIUM 40 MG: 40 TABLET, DELAYED RELEASE ORAL at 05:56

## 2021-03-12 RX ADMIN — ASPIRIN 325 MG: 325 TABLET ORAL at 08:29

## 2021-03-12 RX ADMIN — CLOPIDOGREL BISULFATE 75 MG: 75 TABLET ORAL at 08:29

## 2021-03-12 RX ADMIN — ATORVASTATIN CALCIUM 80 MG: 40 TABLET, FILM COATED ORAL at 08:29

## 2021-03-12 RX ADMIN — LOSARTAN POTASSIUM 50 MG: 50 TABLET, FILM COATED ORAL at 08:29

## 2021-03-12 RX ADMIN — SODIUM CHLORIDE, PRESERVATIVE FREE 10 ML: 5 INJECTION INTRAVENOUS at 08:30

## 2021-03-12 NOTE — PROGRESS NOTES
Adult Nutrition  Assessment    Patient Name:  Josi Roe  YOB: 1945  MRN: [id number]  Admit Date:  3/7/2021    Assessment Date:  3/12/2021    Comments:  76yo female assessed by RD for LOS. Pt s/p CVA w/ accelerated HTN and bradycardia noted. No other PMH. A1C 6.2, Alb 3.9. ST evaluated- Reg diet, thin liquids- intakes >75% x3 days. 177# w/ BMI 33.5. Nutrition is appropriate for condition. RD to follow hospital course.        Anthropometrics     Row Name 03/12/21 0500          Anthropometrics    Weight  80.5 kg (177 lb 6.4 oz)           Electronically signed by:  Radha Watson RD  03/12/21 10:30 CST

## 2021-03-12 NOTE — THERAPY TREATMENT NOTE
Acute Care - Physical Therapy Treatment Note  [facility]     Patient Name: Josi Roe  : 1945  MRN: [id number]  Today's Date: 3/12/2021           PT Assessment (last 12 hours)      PT Evaluation and Treatment     Row Name 21 1000          Physical Therapy Time and Intention    Subjective Information  no complaints  -     Document Type  therapy note (daily note)  -     Mode of Treatment  individual therapy;physical therapy  -     Patient Effort  good  -     Row Name 21 1000          General Information    Patient Profile Reviewed  yes  -     Existing Precautions/Restrictions  fall  -[facility] Name 21 1000          Cognition    Orientation Status (Cognition)  oriented x 4  -[facility] Name 21 1000          Pain Scale: Numbers Pre/Post-Treatment    Pretreatment Pain Rating  0/10 - no pain  -     Posttreatment Pain Rating  0/10 - no pain  -[facility] Name 21 1000          Bed Mobility    Supine-Sit [place] (Bed Mobility)  standby assist  -     Sit-Supine [place] (Bed Mobility)  standby assist  -[facility] Name 21 1000          Transfers    Transfers  sit-stand transfer;stand-sit transfer;chair-bed transfer  -     Bed-Chair [place] (Transfers)  contact guard  -     Chair-Bed [place] (Transfers)  contact guard  -     Assistive Device (Bed-Chair Transfers)  walker, front-wheeled  -     Sit-Stand [place] (Transfers)  contact guard;verbal cues  -     Stand-Sit [place] (Transfers)  contact guard;verbal cues  -[facility] Name 21 1000          Chair-Bed Transfer    Assistive Device (Chair-Bed Transfers)  walker, front-wheeled  -[facility] Name 21 1000          Sit-Stand Transfer    Assistive Device (Sit-Stand Transfers)  walker, front-wheeled  -[facility] Name 21 1000          Stand-Sit Transfer    Assistive Device (Stand-Sit Transfers)  walker, front-wheeled  -[facility] Name 21 1000           Gait/Stairs (Locomotion)    [place] Level (Gait)  minimum assist (75% patient effort)  -     Assistive Device (Gait)  walker, front-wheeled  -     Distance in Feet (Gait)  50'  -     Pattern (Gait)  step-to  -     Deviations/Abnormal Patterns (Gait)  ataxic;base of support, wide;gait speed decreased  -     Bilateral Gait Deviations  heel strike decreased  -     Right Sided Gait Deviations  knee buckling, right side slight   -[facility] Name 03/12/21 1000          Safety Issues, Functional Mobility    Impairments Affecting Function (Mobility)  endurance/activity tolerance;balance;coordination  -[facility] Name 03/12/21 1000          Hip (Therapeutic Exercise)    Hip AROM (Therapeutic Exercise)  bilateral;aBduction;aDduction;flexion x20  -[facility] Name 03/12/21 1000          Knee (Therapeutic Exercise)    Knee AROM (Therapeutic Exercise)  bilateral;heel slides x20  -[facility] Name 03/12/21 1000          Ankle (Therapeutic Exercise)    Ankle AROM (Therapeutic Exercise)  bilateral;dorsiflexion;plantarflexion x20  -[facility] Name 03/12/21 1000          Vital Signs    Pre Systolic BP Rehab  168  -     Pre Treatment Diastolic BP  75  -     Pretreatment Heart Rate (beats/min)  55  -JA     Pre Patient Position  Supine  -     Intra Patient Position  Standing  -     Post Patient Position  Supine  -[facility] Name 03/12/21 1000          Bed Mobility Goal 1 (PT)    Activity/Assistive Device (Bed Mobility Goal 1, PT)  bed mobility activities, all  -     [place] Level/Cues Needed (Bed Mobility Goal 1, PT)  independent;modified independence  -     Time Frame (Bed Mobility Goal 1, PT)  by discharge  -     Progress/Outcomes (Bed Mobility Goal 1, PT)  goal not met  -[facility] Name 03/12/21 1000          Transfer Goal 1 (PT)    Activity/Assistive Device (Transfer Goal 1, PT)  bed-to-chair/chair-to-bed  -     [place] Level/Cues Needed (Transfer Goal 1, PT)  independent;modified  independence  -JA     Time Frame (Transfer Goal 1, PT)  by discharge  -     Progress/Outcome (Transfer Goal 1, PT)  goal not met  -     Row Name 03/12/21 1000          Gait Training Goal 1 (PT)    Activity/Assistive Device (Gait Training Goal 1, PT)  gait (walking locomotion);assistive device use  -JA     [place] Level (Gait Training Goal 1, PT)  modified independence  -JA     Distance (Gait Training Goal 1, PT)  150 ft or more  -JA     Time Frame (Gait Training Goal 1, PT)  by discharge  -     Row Name 03/12/21 1000          Stairs Goal 1 (PT)    Activity/Assistive Device (Stairs Goal 1, PT)  ascending stairs;descending stairs  -     [place] Level/Cues Needed (Stairs Goal 1, PT)  modified independence;independent  -JA     Number of Stairs (Stairs Goal 1, PT)  2 or more  -JA     Time Frame (Stairs Goal 1, PT)  by discharge  -     Progress/Outcome (Stairs Goal 1, PT)  goal not met  -     Row Name 03/12/21 1000          Patient Education Goal (PT)    Activity (Patient Education Goal, PT)  pt will be skilled w/ use of FWRW for safety  -     [place]/Cues/Accuracy (Memory Goal 2, PT)  demonstrates adequately  -JA     Time Frame (Patient Education Goal, PT)  by discharge  -     Progress/Outcome (Patient Education Goal, PT)  goal not met  -     Row Name 03/12/21 1000          Positioning and Restraints    Pre-Treatment Position  in bed  -     Post Treatment Position  bed  -     Row Name 03/12/21 1000          Progress Summary (PT)    Progress Toward Functional Goals (PT)  progress toward functional goals as expected  -       User Key  (r) = Recorded By, (t) = Taken By, (c) = Cosigned By    Initials Name Provider Type    Yong Whitney, GWEN Physical Therapy Assistant        Physical Therapy Education                 Title: PT OT SLP Therapies (Done)     Topic: Physical Therapy (Done)     Point: Mobility training (Done)     Learning Progress Summary           Patient Acceptance,  D,E, VU,NR by VASHTI at 3/8/2021 1258    Comment: POC, need for more control in use of FWRW, rec assist at home if d/c home; may benefit from skilled care before home                   Point: Home exercise program (Done)     Learning Progress Summary           Patient Acceptance, D,E, VU,NR by VASHTI at 3/8/2021 1258    Comment: POC, need for more control in use of FWRW, rec assist at home if d/c home; may benefit from skilled care before home                   Point: Body mechanics (Done)     Learning Progress Summary           Patient Acceptance, D,E, VU,NR by VASHTI at 3/8/2021 1258    Comment: POC, need for more control in use of FWRW, rec assist at home if d/c home; may benefit from skilled care before home                   Point: Precautions (Done)     Learning Progress Summary           Patient Acceptance, D,E, VU,NR by  at 3/8/2021 1258    Comment: POC, need for more control in use of FWRW, rec assist at home if d/c home; may benefit from skilled care before home                               User Key     Initials Effective Dates Name Provider Type Discipline     04/03/18 -  Mounika Anand, PT Physical Therapist PT              PT Recommendation and Plan  Anticipated Discharge Disposition (PT): inpatient rehabilitation facility, skilled nursing facility  Progress Summary (PT)  Progress Toward Functional Goals (PT): progress toward functional goals as expected  Plan of Care Reviewed With: patient  Progress: improving  Outcome Summary: Pt. SBA with bed mobility, sit-stand-sit CGA, amb. 50' with RW CGA, performed x20 reps seated therex. Pt. scheduled to d/c to inpatient rehab this p.m.       Time Calculation:   PT Charges     Row Name 03/12/21 1237             Time Calculation    Start Time  1000  -JA      Stop Time  1030  -JA      Time Calculation (min)  30 min  -JA         Time Calculation- PT    Total Timed Code Minutes- PT  30 minute(s)  -JA        User Key  (r) = Recorded By, (t) = Taken By, (c) =  Cosigned By    Initials Name Provider Type    Yong Whitney PTA Physical Therapy Assistant        Therapy Charges for Today     Code Description Service Date Service Provider Modifiers Qty    52656338068 HC GAIT TRAINING EA 15 MIN 3/11/2021 Yong Brizuela, PTA GP 1    94641432626 HC PT THERAPEUTIC ACT EA 15 MIN 3/11/2021 Yong Brizuela, GWEN GP 2    08978903128 HC GAIT TRAINING EA 15 MIN 3/12/2021 Yong Brizuela, PTA GP 1    19567697602 HC PT THER PROC EA 15 MIN 3/12/2021 Yong Brizuela, PTA GP 1          PT G-Codes  Outcome Measure Options: 9 Hole Peg  AM-PAC 6 Clicks Score (PT): 18  AM-PAC 6 Clicks Score (OT): 19  Modified Goodman Scale: 4 - Moderately severe disability.  Unable to walk without assistance, and unable to attend to own bodily needs without assistance.    Yong Brizuela PTA  3/12/2021

## 2021-03-12 NOTE — PLAN OF CARE
Goal Outcome Evaluation:  Plan of Care Reviewed With: patient  Progress: improving  Outcome Summary: Pt. SBA with bed mobility, sit-stand-sit CGA, amb. 50' with RW CGA, performed x20 reps seated therex. Pt. scheduled to d/c to inpatient rehab this p.m.

## 2021-03-12 NOTE — THERAPY TREATMENT NOTE
Patient Name: Josi Roe  : 1945    MRN: [id number]                              Today's Date: 3/12/2021       Admit Date: 3/7/2021    Visit Dx:     ICD-10-CM ICD-9-CM   1. Cerebrovascular accident (CVA) due to thrombosis of cerebral artery (CMS/HCC)  I63.30 434.01   2. Cerebrovascular accident (CVA), unspecified mechanism (CMS/HCC)  I63.9 434.91   3. Hypertensive emergency  I16.1 401.9   4. Impaired mobility and ADLs  Z74.09 V49.89    Z78.9    5. Impaired functional mobility, balance, gait, and endurance  Z74.09 V49.89   6. Other hereditary cerebrovascular disease   I67.858 437.8     Patient Active Problem List   Diagnosis   • Cerebrovascular accident (CVA) (CMS/HCC)     Past Medical History:   Diagnosis Date   • Heart murmur    • Hypertension      Past Surgical History:   Procedure Laterality Date   • CHOLECYSTECTOMY     • CLUB FOOT RELEASE     • HYSTERECTOMY       General Information     Row Name 21          OT Time and Intention    Document Type  therapy note (daily note)  -CS     Mode of Treatment  occupational therapy  -CS     Row Name 21          General Information    Patient Profile Reviewed  yes  -CS     Existing Precautions/Restrictions  fall  -CS     Row Name 21          Cognition    Orientation Status (Cognition)  oriented x 4  -CS     Row Name 21          Safety Issues, Functional Mobility    Impairments Affecting Function (Mobility)  endurance/activity tolerance;balance;coordination  -CS       User Key  (r) = Recorded By, (t) = Taken By, (c) = Cosigned By    Initials Name Provider Type    CS Keyla Rendon COTA/CHIQUIS Occupational Therapy Assistant          Mobility/ADL's     Row Name 21          Bed Mobility    Bed Mobility  supine-sit;sit-supine  -CS     [place]ooting/Bridging [place] (Bed Mobility)  standby assist  -CS     Supine-Sit [place] (Bed Mobility)  standby assist  -CS     Sit-Supine [place] (Bed Mobility)   standby assist  -     Assistive Device (Bed Mobility)  bed rails;draw sheet;head of bed elevated  -     Row Name 03/12/21 0830          Transfers    Transfers  sit-stand transfer  -     Sit-Stand [place] (Transfers)  contact guard;verbal cues  -     Row Name 03/12/21 0830          Sit-Stand Transfer    Assistive Device (Sit-Stand Transfers)  walker, front-wheeled  -     Row Name 03/12/21 0830          Activities of Daily Living    BADL Assessment/Intervention  bathing;upper body dressing;lower body dressing;grooming;toileting  -     Row Name 03/12/21 0830          Lower Body Dressing Assessment/Training    [place] Level (Lower Body Dressing)  lower body dressing skills;doff;don;pants/bottoms;socks;standby assist;set up  -CS     Position (Lower Body Dressing)  edge of bed sitting  -     Row Name 03/12/21 0830          Upper Body Dressing Assessment/Training    [place] Level (Upper Body Dressing)  doff;don;set up  -CS     Position (Upper Body Dressing)  edge of bed sitting  -     Row Name 03/12/21 0830          Bathing Assessment/Intervention    [place] Level (Bathing)  lower body;upper body;standby assist;set up  -CS     Position (Bathing)  edge of bed sitting  -CS     Row Name 03/12/21 0830          Grooming Assessment/Training    [place] Level (Grooming)  hair care, combing/brushing;oral care regimen;wash face, hands;standby assist;set up;other (see comments) shampoo hair; apply lotion/deodorant  -CS     Position (Grooming)  edge of bed sitting  -     Row Name 03/12/21 0830          Toileting Assessment/Training    [place] Level (Toileting)  perform perineal hygiene  -CS     Position (Toileting)  supported standing  -       User Key  (r) = Recorded By, (t) = Taken By, (c) = Cosigned By    Initials Name Provider Type    CS Keyla Rendon COTA/CHIQUIS Occupational Therapy Assistant        Obj/Interventions     Row Name 03/12/21 0830          Shoulder (Therapeutic  Exercise)    Shoulder (Therapeutic Exercise)  AROM (active range of motion)  -     Shoulder AROM (Therapeutic Exercise)  bilateral;flexion;extension;external rotation;internal rotation  -     Row Name 03/12/21 0830          Elbow/Forearm (Therapeutic Exercise)    Elbow/Forearm (Therapeutic Exercise)  AROM (active range of motion)  -     Elbow/Forearm AROM (Therapeutic Exercise)  bilateral;flexion;extension;supination;pronation  -     Row Name 03/12/21 0830          Wrist (Therapeutic Exercise)    Wrist (Therapeutic Exercise)  AROM (active range of motion)  -     Wrist AROM (Therapeutic Exercise)  bilateral;flexion;extension  -     Row Name 03/12/21 0830          Hand (Therapeutic Exercise)    Hand (Therapeutic Exercise)  AROM (active range of motion)  -     Hand AROM/AAROM (Therapeutic Exercise)  bilateral;finger flexion;finger extension  -     Row Name 03/12/21 0830          Balance    Static Sitting Balance  WFL  -     Dynamic Sitting Balance  WFL  -     Row Name 03/12/21 0830          Therapeutic Exercise    Therapeutic Exercise  shoulder;elbow/forearm;wrist;hand  -       User Key  (r) = Recorded By, (t) = Taken By, (c) = Cosigned By    Initials Name Provider Type    CS Keyla Rendon COTA/CHIQUIS Occupational Therapy Assistant        Goals/Plan     Row Name 03/12/21 0830          Transfer Goal 1 (OT)    Activity/Assistive Device (Transfer Goal 1, OT)  toilet  -CS     [place] Level/Cues Needed (Transfer Goal 1, OT)  supervision required  -CS     Time Frame (Transfer Goal 1, OT)  long term goal (LTG);by discharge  -CS     Progress/Outcome (Transfer Goal 1, OT)  goal not met  -     Row Name 03/12/21 0830          Bathing Goal 1 (OT)    Activity/Device (Bathing Goal 1, OT)  lower body bathing AD as needed  -CS     [place] Level/Cues Needed (Bathing Goal 1, OT)  supervision required  -CS     Time Frame (Bathing Goal 1, OT)  long term goal (LTG);by discharge  -CS     Progress/Outcomes  (Bathing Goal 1, OT)  goal not met  -CS     Row Name 03/12/21 0830          Dressing Goal 1 (OT)    Activity/Device (Dressing Goal 1, OT)  lower body dressing AD as needed  -CS     [place]/Cues Needed (Dressing Goal 1, OT)  supervision required  -CS     Time Frame (Dressing Goal 1, OT)  long term goal (LTG);by discharge  -CS     Progress/Outcome (Dressing Goal 1, OT)  goal not met  -CS       User Key  (r) = Recorded By, (t) = Taken By, (c) = Cosigned By    Initials Name Provider Type    CS Keyla Rendon COTA/CHIQUIS Occupational Therapy Assistant        Clinical Impression     Row Name 03/12/21 0830          Pain Scale: Numbers Pre/Post-Treatment    Pretreatment Pain Rating  0/10 - no pain  -CS     Posttreatment Pain Rating  0/10 - no pain  -CS     Row Name 03/12/21 0830          Plan of Care Review    Plan of Care Reviewed With  patient  -CS     Progress  improving  -CS     Row Name 03/12/21 0830          Therapy Assessment/Plan (OT)    Rehab Potential (OT)  good, to achieve stated therapy goals  -CS     Criteria for Skilled Therapeutic Interventions Met (OT)  yes;meets criteria;skilled treatment is necessary  -CS     Therapy Frequency (OT)  daily  -CS     Row Name 03/12/21 0830          Therapy Plan Review/Discharge Plan (OT)    Anticipated Discharge Disposition (OT)  home;home with 24/7 care  -CS     Row Name 03/12/21 0830          Vital Signs    Pretreatment Heart Rate (beats/min)  51  -CS     Pre SpO2 (%)  96  -CS     O2 Delivery Pre Treatment  room air  -CS     Pre Patient Position  Supine  -CS     Post Patient Position  Supine  -CS     Row Name 03/12/21 0830          Positioning and Restraints    Pre-Treatment Position  in bed  -CS     Post Treatment Position  bed  -CS     In Bed  supine;call light within reach;encouraged to call for assist;exit alarm on  -CS       User Key  (r) = Recorded By, (t) = Taken By, (c) = Cosigned By    Initials Name Provider Type    Keyla Chambers COTA/CHIQUIS Occupational  Therapy Assistant        Outcome Measures    No documentation.       Occupational Therapy Education                 Title: PT OT SLP Therapies (Done)     Topic: Occupational Therapy (Done)     Point: ADL training (Done)     Description:   Instruct learner(s) on proper safety adaptation and remediation techniques during self care or transfers.   Instruct in proper use of assistive devices.              Learning Progress Summary           Patient Acceptance, E,TB,D, VU by CS at 3/12/2021 1300    Comment: Pt was educated on home safety and HEP.    Acceptance, E,TB, VU by LW at 3/10/2021 1259    Acceptance, E, VU by AS at 3/9/2021 0933    Comment: ADL training, transfer training, safety edu                   Point: Home exercise program (Done)     Description:   Instruct learner(s) on appropriate technique for monitoring, assisting and/or progressing therapeutic exercises/activities.              Learning Progress Summary           Patient Acceptance, E,TB,D, VU by  at 3/12/2021 1300    Comment: Pt was educated on home safety and HEP.    Acceptance, E,TB, VU by LW at 3/10/2021 1259                   Point: Precautions (Done)     Description:   Instruct learner(s) on prescribed precautions during self-care and functional transfers.              Learning Progress Summary           Patient Acceptance, E,TB,D, VU by  at 3/12/2021 1300    Comment: Pt was educated on home safety and HEP.    Acceptance, E,TB, VU by LW at 3/10/2021 1259    Acceptance, E, VU by AS at 3/9/2021 0933    Comment: ADL training, transfer training, safety edu    Acceptance, E, VU by ME at 3/8/2021 1114    Comment: Educated on OT and POC. Educated to call for assistance. Educated on safety precautions. Educated on discharge recommendations.                   Point: Body mechanics (Done)     Description:   Instruct learner(s) on proper positioning and spine alignment during self-care, functional mobility activities and/or exercises.               Learning Progress Summary           Patient Acceptance, E,TB,D, VU by  at 3/12/2021 1300    Comment: Pt was educated on home safety and HEP.    Acceptance, E,TB, VU by LW at 3/10/2021 1259    Acceptance, E, VU by ME at 3/8/2021 1114    Comment: Educated on OT and POC. Educated to call for assistance. Educated on safety precautions. Educated on discharge recommendations.                               User Key     Initials Effective Dates Name Provider Type Discipline     03/07/18 -  Keyla Rendon COTA/CHIQUIS Occupational Therapy Assistant OT    LW 03/07/18 -  Prabha Garcia COTA/L Occupational Therapy Assistant OT    AS 07/05/20 -  Lucy Fernandes OT Occupational Therapist OT    ME 08/24/20 -  Latonia See OTR/L Occupational Therapist OT              OT Recommendation and Plan  Therapy Frequency (OT): daily  Plan of Care Review  Plan of Care Reviewed With: patient  Progress: improving  Outcome Summary: Pt tolerated tx well this date. Pt was SBA with bed mobility. Pt completed an ADL. Pt gave good effort with UE ther ex. Continue OT POC.     Time Calculation:   Time Calculation- OT     Row Name 03/12/21 1258             Time Calculation- OT    OT Start Time  0830  -CS      OT Stop Time  0953  -      OT Time Calculation (min)  83 min  -      Total Timed Code Minutes- OT  83 minute(s)  -      OT Received On  03/12/21  -        User Key  (r) = Recorded By, (t) = Taken By, (c) = Cosigned By    Initials Name Provider Type    CS Keyla Rendon RODRIGUEZ/CHIQUIS Occupational Therapy Assistant        Therapy Charges for Today     Code Description Service Date Service Provider Modifiers Qty    89933786151 HC OT SELF CARE/MGMT/TRAIN EA 15 MIN 3/11/2021 Keyla Rendon RODRIGUEZ/L GO 3    84792886011 HC OT THER PROC EA 15 MIN 3/11/2021 Keyla Rendon RODRIGUEZ/L GO 2    74699706383 HC OT SELF CARE/MGMT/TRAIN EA 15 MIN 3/12/2021 Keyla Rendon RODRIGUEZ/L GO 4    04009656108 HC OT THER PROC EA 15 MIN 3/12/2021 Justice  MICHAEL Phillips/CHIQUIS GO 2               MICHAEL Phillip/CHIQUIS  3/12/2021

## 2021-03-12 NOTE — DISCHARGE SUMMARY
[facility] Medicine Services  DISCHARGE SUMMARY       Date of Admission: 3/7/2021  Date of Discharge:  3/12/2021  Primary Care Physician: Debra Wilkes MD    Presenting Problem/History of Present Illness:  Hypertensive emergency [I16.1]  Cerebrovascular accident (CVA), unspecified mechanism (CMS/HCC) [I63.9]     Final Discharge Diagnoses:  Active Hospital Problems    Diagnosis    • Cerebrovascular accident (CVA) (CMS/[facility])    Essential hypertension  Sinus bradycardia    Consults:   Consults     Date and Time Order Name Status Description    3/9/2021 11:11 AM Inpatient Cardiology Consult Completed           Procedures Performed: None                Pertinent Test Results:   Procedure Component Value Units Date/Time   CT Angiogram Carotids [[id number]] Tobias as Reviewed   Order Status: Completed Collected: 03/07/21 1520    Updated: 03/07/21 1644   Narrative:     PROCEDURE: CT HEAD ANGIOGRAPHY WITH IV CONTRAST, CT NECK   ANGIOGRAPHY WITH IV CONTRAST     CLINICAL HISTORY: L pontine stroke, I63.9 Cerebral infarction,   unspecified I16.1 Hypertensive emergency     INDICATION: Same as above     Comparison: MRI of the brain done on the same day     TECHNIQUE:     CT angiography of the head and neck neck was done with   intravenous contrast followed by 3-D rendering of the   intracranial and neck arterial vasculature.     The patient was injected with 90 mL of Isovue-370 intravenously,   without any documented immediate adverse reactions.     This exam was performed according to the departmental   dose-optimization program which includes automated exposure   control, adjustment of the mA and/or kV according to patient size   and/or use of iterative reconstruction technique.       NASCET criteria was utilized in evaluation of arterial stenosis.     FINDINGS:     CTA OF THE HEAD:     There is no evidence of hemodynamically significant stenosis or a   focal aneurysm formation in  the visualized intracranial portion   the bilateral internal carotid arteries, carotid siphon region,   bilateral anterior, middle and posterior cerebral arteries and   their branches.     The basilar tip is normal.     Antegrade flow is seen in the bilateral vertebral arteries.     Evaluation of the superior sagittal sinus, vein of Minh region,   torcula, straight sinus, bilateral internal cerebral veins,   bilateral sigmoid sinuses and the jugular foramina bilaterally do   not show any gross abnormalities.     CTA OF THE NECK:     There is no evidence of hemodynamically significant stenosis or a   focal aneurysm formation in the bilateral common carotid and the   bilateral internal carotid arteries.     Tortuosity of the bilateral internal carotid arteries in the   cervical region is noted     Antegrade flow is seen in the bilateral vertebral arteries.     The takeoff of the great vessels from the aortic arch region is   unremarkable.     The visualized cervical spine shows multilevel degenerative   change .     The prevertebral soft tissues unremarkable.     The laryngotracheal airway is widely patent. Note is made of   multiple bilateral thyroid nodules, the largest nodule seen in   the right side and measuring 2.4 cm and can be evaluated   electively with an ultrasound of the thyroid     The visualized lung apices are unremarkable .    Impression:       Unremarkable CT angiography of the brain.   Unremarkable CT angiography of the neck.   Note is made of multiple bilateral thyroid nodules, the largest   nodule seen in the right side and measuring 2.4 cm and can be   evaluated electively with an ultrasound of the thyroid     Electronically signed by:  Christian Davis MD  3/7/2021 4:43 PM CST   Workstation: [id number]-   CT Angiogram Head [[id number]] Tobias as Reviewed   Order Status: Completed Collected: 03/07/21 1520    Updated: 03/07/21 1644   Narrative:     PROCEDURE: CT HEAD ANGIOGRAPHY WITH IV CONTRAST, CT  NECK   ANGIOGRAPHY WITH IV CONTRAST     CLINICAL HISTORY: L pontine stroke, I63.9 Cerebral infarction,   unspecified I16.1 Hypertensive emergency     INDICATION: Same as above     Comparison: MRI of the brain done on the same day     TECHNIQUE:     CT angiography of the head and neck neck was done with   intravenous contrast followed by 3-D rendering of the   intracranial and neck arterial vasculature.     The patient was injected with 90 mL of Isovue-370 intravenously,   without any documented immediate adverse reactions.     This exam was performed according to the departmental   dose-optimization program which includes automated exposure   control, adjustment of the mA and/or kV according to patient size   and/or use of iterative reconstruction technique.       NASCET criteria was utilized in evaluation of arterial stenosis.     FINDINGS:     CTA OF THE HEAD:     There is no evidence of hemodynamically significant stenosis or a   focal aneurysm formation in the visualized intracranial portion   the bilateral internal carotid arteries, carotid siphon region,   bilateral anterior, middle and posterior cerebral arteries and   their branches.     The basilar tip is normal.     Antegrade flow is seen in the bilateral vertebral arteries.     Evaluation of the superior sagittal sinus, vein of Minh region,   torcula, straight sinus, bilateral internal cerebral veins,   bilateral sigmoid sinuses and the jugular foramina bilaterally do   not show any gross abnormalities.     CTA OF THE NECK:     There is no evidence of hemodynamically significant stenosis or a   focal aneurysm formation in the bilateral common carotid and the   bilateral internal carotid arteries.     Tortuosity of the bilateral internal carotid arteries in the   cervical region is noted     Antegrade flow is seen in the bilateral vertebral arteries.     The takeoff of the great vessels from the aortic arch region is   unremarkable.     The visualized  cervical spine shows multilevel degenerative   change .     The prevertebral soft tissues unremarkable.     The laryngotracheal airway is widely patent. Note is made of   multiple bilateral thyroid nodules, the largest nodule seen in   the right side and measuring 2.4 cm and can be evaluated   electively with an ultrasound of the thyroid     The visualized lung apices are unremarkable .    Impression:       Unremarkable CT angiography of the brain.   Unremarkable CT angiography of the neck.   Note is made of multiple bilateral thyroid nodules, the largest   nodule seen in the right side and measuring 2.4 cm and can be   evaluated electively with an ultrasound of the thyroid     Electronically signed by:  Christian Davis MD  3/7/2021 4:43 PM [facility]   Workstation: [id number]-   MRI Brain Without Contrast [[id number]] Tobias as Reviewed   Order Status: Completed Collected: 03/07/21 1214    Updated: 03/07/21 1339   Narrative:       MRI of the brain without contrast     HISTORY: Acute neurologic deficit.     Multisequence multiplanar images of the brain were obtained   without and with contrast.     COMPARISON: March 3, 2021. Correlation CT March 7, 2021..     FINDINGS:   Minimal mucosal thickening ethmoid sinuses.     Acute 1.5 cm infarct left side of the renny.   Old 1.9 cm infarct anterior aspect of the left basal ganglia.   Old thalamic and basal ganglia lacunar infarcts.   Minimal small vessel disease.   Cerebral atrophy.   No hemorrhage.   No mass.   No abnormal enhancement.   No midline shift and no abnormal extra-axial fluid collections.   Normal signal flow voids are present in the major vessels and   venous sinuses.    Impression:     CONCLUSION:   Acute 1.5 cm infarct left side of the renny.   Old 1.9 cm infarct anterior aspect of the left basal ganglia.   Old thalamic and basal ganglia lacunar infarcts.   Minimal small vessel disease.   Cerebral atrophy.     Report called at approximately 1:50 PM CST.      Chief  "Complaint on Day of Discharge: None    Hospital Course:  The patient is a 75 y.o. female with history of hypertension  who presented to the ER with complaints of sudden onset of slurred speech, right-sided weakness and dizziness.      On triage, her peak blood pressure was 246/113 mmHg which did improve to 165/77 mmhg. Noncontrast CT scan of the head showed old left basal ganglia and caudate nucleus infarcts and old lacunar infarct in the right thalamus. MRI of the brain showed showed an acute to 1.5 cm infarct on the left side of the renny.  Patient was seen by teleneurologist and started on dual antiplatelet therapy and high-dose statin.  She also received physical and occupational therapy as well as speech therapy for stroke.  She had elevated blood pressure which was controlled with oral antihypertensive medications.  He had some bradycardia during the admission but had good chronotropic competence with increasing heart rate during exercise.  TSH was normal.  She was evaluated by cardiologist and plan for heart monitor placement after discharge to monitor for arrhythmias or occult atrial fibrillation.  She was discharged in stable condition to an inpatient rehab facility for physical rehabilitation.    Condition on Discharge: Stable    Physical Exam on Discharge:  BP (!) 193/86 (BP Location: Left arm, Patient Position: Lying)   Pulse 52   Temp 98 °F (36.7 °C) (Oral)   Resp 18   Ht 154.9 cm (61\")   Wt 80.5 kg (177 lb 6.4 oz)   SpO2 97%   BMI 33.52 kg/m²      Physical Exam  Constitutional:       General: She is not in acute distress.     Appearance: Normal appearance. She is not ill-appearing or diaphoretic.   HENT:      Head: Normocephalic and atraumatic.      Right Ear: External ear normal.      Left Ear: External ear normal.      Nose: No congestion or rhinorrhea.      Mouth/Throat:      Mouth: Mucous membranes are moist.      Pharynx: No oropharyngeal exudate or posterior oropharyngeal erythema.   Eyes:  "      General: No scleral icterus.     Extraocular Movements: Extraocular movements intact.      Conjunctiva/sclera: Conjunctivae normal.   Cardiovascular:      Rate and Rhythm: Normal rate and regular rhythm.      Heart sounds: Normal heart sounds. No murmur.   Pulmonary:      Effort: Pulmonary effort is normal. No respiratory distress.      Breath sounds: Normal breath sounds. No wheezing, rhonchi or rales.   Abdominal:      General: Abdomen is flat. There is no distension.      Palpations: Abdomen is soft.      Tenderness: There is no abdominal tenderness. There is no guarding.   Musculoskeletal:         General: No swelling, tenderness or deformity.      Cervical back: Neck supple. No rigidity. No muscular tenderness.      Right lower leg: No edema.      Left lower leg: No edema.   Lymphadenopathy:      Cervical: No cervical adenopathy.   Skin:     General: Skin is warm and dry.   Neurological:      General: No focal deficit present.      Mental Status: She is alert and oriented to person, place, and time.      Cranial Nerves: No cranial nerve deficit.      Motor: Weakness present.      Comments: Power in right upper and lower limb grade 4/5.  Power in left upper and lower limbs graded 5/5.  No facial droop noted.   Psychiatric:         Mood and Affect: Mood normal.         Behavior: Behavior normal.         Thought Content: Thought content normal.     Discharge Disposition:  Rehab Facility or Unit (DC - External)    Discharge Medications:     Discharge Medications      New Medications      Instructions Start Date   amLODIPine 10 MG tablet  Commonly known as: NORVASC  Notes to patient: 03/13/2021   10 mg, Oral, Every 24 Hours Scheduled   Start Date: March 13, 2021     aspirin 325 MG tablet  Notes to patient: 03/13/2021   325 mg, Oral, Daily   Start Date: March 13, 2021     atorvastatin 80 MG tablet  Commonly known as: LIPITOR  Notes to patient: 03/13/2021   80 mg, Oral, Daily   Start Date: March 13, 2021      clopidogrel 75 MG tablet  Commonly known as: PLAVIX  Notes to patient: 03/13/2021   75 mg, Oral, Daily   Start Date: March 13, 2021     losartan 50 MG tablet  Commonly known as: COZAAR  Notes to patient: 03/12/2021   50 mg, Oral, 2 Times Daily         Continue These Medications      Instructions Start Date   VITAMIN D (CHOLECALCIFEROL) PO  Notes to patient: 03/13/2021   5,000 Units, Oral, Daily         Stop These Medications    cloNIDine 0.1 MG tablet  Commonly known as: CATAPRES     predniSONE 20 MG tablet  Commonly known as: DELTASONE            Discharge Diet:   Diet Instructions     Diet: Cardiac      Discharge Diet: Cardiac          Activity at Discharge:   Activity Instructions     Activity as Tolerated            Discharge Care Plan/Instructions:   1.  Follow-up with your primary care provider within 1 week of discharge.  2.  Follow-up with cardiologist Dr. Poole within 1 week of discharge for placement of a cardiac monitoring device in the office.    Follow-up Appointments:   No future appointments.    Test Results Pending at Discharge:     Rosemary Carlin MD  03/12/21  19:17 CST    Time: 38 minutes of time was spent evaluating patient and planning discharge.      Part of this note may be an electronic transcription/translation of spoken language to printed text using the Dragon Dictation system.

## 2021-03-12 NOTE — PLAN OF CARE
Problem: Adult Inpatient Plan of Care  Goal: Plan of Care Review  Outcome: Ongoing, Progressing   Goal Outcome Evaluation:          Pt s/p CVA w/ accelerated HTN and bradycardia noted. No other PMH. A1C 6.2, Alb 3.9. ST evaluated- Reg diet, thin liquids- intakes >75% x3 days. 177# w/ BMI 33.5. Nutrition is appropriate for condition. RD following.

## 2021-03-12 NOTE — PLAN OF CARE
Goal Outcome Evaluation:  Plan of Care Reviewed With: patient  Progress: no change  Outcome Summary: pt vss, resting well between care, no complaints at this time

## 2021-03-12 NOTE — DISCHARGE PLACEMENT REQUEST
"Josi Roe (75 y.o. Female)     Date of Birth Social Security Number Address Home Phone MRN    1945  [address]  [place] [address] [phone] [id number]    [place] Marital Status          Jainism        Admission Date Admission Type Admitting Provider Attending Provider Department, Room/Bed    3/7/21 Emergency EitanenduChristopher MD Ebenibo, Sotonte E, MD [facility] 3 [facility], [facility]/1    Discharge Date Discharge Disposition Discharge Destination                       Attending Provider: Rosemary Carlin MD    Allergies: Novocain [Procaine]    Isolation: None   Infection: None   Code Status: CPR    Ht: 154.9 cm (61\")   Wt: 80.5 kg (177 lb 6.4 oz)    Admission Cmt: None   Principal Problem: None                Active Insurance as of 3/7/2021     Primary Coverage     Payor Plan Insurance Group Employer/Plan Group    HUMANA MEDICARE REPLACEMENT HUMANA MEDICARE REPLACEMENT [id number]     Payor Plan Address Payor Plan Phone Number Payor Plan Fax Number Effective Dates    [address] [address] [phone]  8/1/2020 - None Entered    Former[facility] [id number]       Subscriber Name Subscriber Birth Date Member ID       JOSI ROE 1945 [id number]                 Emergency Contacts      (Rel.) Home Phone Work Phone Mobile Phone    SEAN HOLBROOK (Daughter) [phone] -- --        Kayla Alvarenga RN, CM  [facility]  [phone] phone  [phone] fax           Current Facility-Administered Medications   Medication Dose Route Frequency Provider Last Rate Last Admin   • acetaminophen (TYLENOL) tablet 650 mg  650 mg Oral Q4H PRN Christopher Alvarez MD        Or   • acetaminophen (TYLENOL) 160 MG/5ML solution 650 mg  650 mg Oral Q4H PRN Christopher Alvarez MD        Or   • acetaminophen (TYLENOL) suppository 650 mg  650 mg Rectal Q4H PRN Christopher Alvarez MD       • amLODIPine (NORVASC) tablet 10 mg  10 mg Oral Q24H Raegan" Rosemary METZGER MD   10 mg at 03/12/21 0829   • aspirin tablet 325 mg  325 mg Oral Daily Bandar De Guzman APRN   325 mg at 03/12/21 0829   • atorvastatin (LIPITOR) tablet 80 mg  80 mg Oral Daily Christopher Alvarez MD   80 mg at 03/12/21 0829   • clopidogrel (PLAVIX) tablet 75 mg  75 mg Oral Daily Bandar De Guzman APRN   75 mg at 03/12/21 0829   • enalaprilat (VASOTEC) injection 1.25 mg  1.25 mg Intravenous Q6H PRN Christopher Alvarez MD   1.25 mg at 03/10/21 2329   • enoxaparin (LOVENOX) syringe 40 mg  40 mg Subcutaneous Q24H Christopher Alvarez MD   40 mg at 03/11/21 1650   • influenza vac split quad (FLUZONE,FLUARIX,AFLURIA,FLULAVAL) injection 0.5 mL  0.5 mL Intramuscular During Hospitalization Christopher Alvarez MD       • losartan (COZAAR) tablet 50 mg  50 mg Oral BID Aly Avelar MD   50 mg at 03/12/21 0829   • ondansetron (ZOFRAN) tablet 4 mg  4 mg Oral Q6H PRN Christopher Alvarez MD        Or   • ondansetron (ZOFRAN) injection 4 mg  4 mg Intravenous Q6H PRN Christopher Alvarez MD       • pantoprazole (PROTONIX) EC tablet 40 mg  40 mg Oral Q AM Christopher Alvarez MD   40 mg at 03/12/21 0556   • sodium chloride 0.9 % flush 10 mL  10 mL Intravenous PRN Christopher Alvarez MD       • sodium chloride 0.9 % flush 10 mL  10 mL Intravenous Q12H Christopher Alvarez MD   10 mL at 03/12/21 0830   • sodium chloride 0.9 % flush 10 mL  10 mL Intravenous PRN Christopher Alvarez MD   10 mL at 03/09/21 1631        Physician Progress Notes (most recent note)      Rosemary Carlin MD at 03/11/21 1317              [facility] Medicine Services  INPATIENT PROGRESS NOTE    Length of Stay: 4  Date of Admission: 3/7/2021  Primary Care Physician: Debra Wilkes MD    Subjective   Chief Complaint: Right arm/leg weakness and slurred speech    HPI: Patient states that her right arm and leg weakness is improved today.*She denies headaches.  She is working with physical therapy and  Occupational Therapy as well as speech therapy.    Review of Systems   Constitutional: Negative for activity change, appetite change, chills, fatigue and fever.   HENT: Negative for congestion, ear pain, rhinorrhea, sore throat and trouble swallowing.    Respiratory: Negative for cough, chest tightness, shortness of breath and wheezing.    Cardiovascular: Negative for chest pain, palpitations and leg swelling.   Gastrointestinal: Negative for abdominal distention, abdominal pain, diarrhea, nausea and vomiting.   Genitourinary: Negative for difficulty urinating, dysuria and hematuria.   Musculoskeletal: Negative for arthralgias, back pain and myalgias.   Skin: Negative for pallor and rash.   Neurological: Positive for weakness. Negative for dizziness, syncope, light-headedness and headaches.   Hematological: Negative for adenopathy. Does not bruise/bleed easily.   Psychiatric/Behavioral: Negative for agitation and confusion. The patient is not nervous/anxious.      Objective    Temp:  [96.8 °F (36 °C)-98.1 °F (36.7 °C)] 98.1 °F (36.7 °C)  Heart Rate:  [53-86] 59  Resp:  [18] 18  BP: (148-172)/(64-78) 151/74    Physical Exam  Constitutional:       General: She is not in acute distress.     Appearance: Normal appearance. She is not ill-appearing or diaphoretic.   HENT:      Head: Normocephalic and atraumatic.      Right Ear: External ear normal.      Left Ear: External ear normal.      Nose: No congestion or rhinorrhea.      Mouth/Throat:      Mouth: Mucous membranes are moist.      Pharynx: No oropharyngeal exudate or posterior oropharyngeal erythema.   Eyes:      General: No scleral icterus.     Extraocular Movements: Extraocular movements intact.      Conjunctiva/sclera: Conjunctivae normal.   Cardiovascular:      Rate and Rhythm: Normal rate and regular rhythm.      Heart sounds: Normal heart sounds. No murmur.   Pulmonary:      Effort: Pulmonary effort is normal. No respiratory distress.      Breath sounds: Normal  breath sounds. No wheezing, rhonchi or rales.   Abdominal:      General: Abdomen is flat. There is no distension.      Palpations: Abdomen is soft.      Tenderness: There is no abdominal tenderness. There is no guarding.   Musculoskeletal:         General: No swelling, tenderness or deformity.      Cervical back: Neck supple. No rigidity. No muscular tenderness.      Right lower leg: No edema.      Left lower leg: No edema.   Lymphadenopathy:      Cervical: No cervical adenopathy.   Skin:     General: Skin is warm and dry.   Neurological:      General: No focal deficit present.      Mental Status: She is alert and oriented to person, place, and time.      Cranial Nerves: No cranial nerve deficit.      Motor: Weakness present.      Comments: Power in right upper and lower limb grade 4/5.  Power in left upper and lower limbs graded 5/5.  No facial droop noted.   Psychiatric:         Mood and Affect: Mood normal.         Behavior: Behavior normal.         Thought Content: Thought content normal.         Medication Review:    Current Facility-Administered Medications:   •  acetaminophen (TYLENOL) tablet 650 mg, 650 mg, Oral, Q4H PRN **OR** acetaminophen (TYLENOL) 160 MG/5ML solution 650 mg, 650 mg, Oral, Q4H PRN **OR** acetaminophen (TYLENOL) suppository 650 mg, 650 mg, Rectal, Q4H PRN, Christopher Alvarez MD  •  [START ON 3/12/2021] amLODIPine (NORVASC) tablet 10 mg, 10 mg, Oral, Q24H, Rosemary Carlin MD  •  aspirin tablet 325 mg, 325 mg, Oral, Daily, Bandar De Guzman APRN, 325 mg at 03/11/21 0825  •  atorvastatin (LIPITOR) tablet 80 mg, 80 mg, Oral, Daily, Christopher Alvarez MD, 80 mg at 03/11/21 0825  •  clopidogrel (PLAVIX) tablet 75 mg, 75 mg, Oral, Daily, Bandar De uGzman APRN, 75 mg at 03/11/21 0825  •  enalaprilat (VASOTEC) injection 1.25 mg, 1.25 mg, Intravenous, Q6H PRN, Christopher Alvarez MD, 1.25 mg at 03/10/21 2425  •  enoxaparin (LOVENOX) syringe 40 mg, 40 mg, Subcutaneous, Q24H, Christopher Alvarez  MD, 40 mg at 03/10/21 1547  •  influenza vac split quad (FLUZONE,FLUARIX,AFLURIA,FLULAVAL) injection 0.5 mL, 0.5 mL, Intramuscular, During Hospitalization, Christopher Alvarez MD  •  losartan (COZAAR) tablet 50 mg, 50 mg, Oral, BID, Aly Avelar MD, 50 mg at 03/11/21 0825  •  ondansetron (ZOFRAN) tablet 4 mg, 4 mg, Oral, Q6H PRN **OR** ondansetron (ZOFRAN) injection 4 mg, 4 mg, Intravenous, Q6H PRN, Christopher Alvarez MD  •  pantoprazole (PROTONIX) EC tablet 40 mg, 40 mg, Oral, Q AM, Christopher Alvarez MD, 40 mg at 03/11/21 0514  •  sodium chloride 0.9 % flush 10 mL, 10 mL, Intravenous, PRN, Christopher Alvarez MD  •  sodium chloride 0.9 % flush 10 mL, 10 mL, Intravenous, Q12H, Christopher Alvarez MD, 10 mL at 03/11/21 0826  •  sodium chloride 0.9 % flush 10 mL, 10 mL, Intravenous, PRN, Christopher Alvarez MD, 10 mL at 03/09/21 1631    I have reviewed the patient's current medications.     Results Review:  I have reviewed the labs, radiology results, and diagnostic studies.    Laboratory Data:   Results from last 7 days   Lab Units 03/08/21  0311 03/07/21  0932   SODIUM mmol/L 141 140   POTASSIUM mmol/L 3.8 4.2   CHLORIDE mmol/L 110* 106   CO2 mmol/L 24.0 27.0   BUN mg/dL 20 30*   CREATININE mg/dL 0.79 0.84   GLUCOSE mg/dL 77 75   CALCIUM mg/dL 11.2* 11.9*   BILIRUBIN mg/dL  --  0.5   ALK PHOS U/L  --  58   ALT (SGPT) U/L  --  20   AST (SGOT) U/L  --  14   ANION GAP mmol/L 7.0 7.0     Estimated Creatinine Clearance: 60.1 mL/min (by C-G formula based on SCr of 0.79 mg/dL).          Results from last 7 days   Lab Units 03/08/21  0311 03/07/21  0932   WBC 10*3/mm3 9.24 11.18*   HEMOGLOBIN g/dL 14.3 14.1   HEMATOCRIT % 43.0 41.4   PLATELETS 10*3/mm3 237 278           Culture Data:   No results found for: BLOODCX  No results found for: URINECX  No results found for: RESPCX  No results found for: WOUNDCX  No results found for: STOOLCX  No components found for: BODYFLD    Radiology Data:   Imaging Results (Last 24  Hours)     ** No results found for the last 24 hours. **          Assessment/Plan     Hospital Problem List:  Active Problems:    Cerebrovascular accident (CVA) (CMS/[facility])  Essential hypertension  Sinus bradycardia    Plan  -Continue dual antiplatelet therapy and statins  -Neurology consultation input appreciated.  -Continue PT/OT and speech therapy  -Plan for rehab facility placement  -Increase p.o. amlodipine to 10 mg daily for essential hypertension.  Continue p.o. Cozaar 50 mg twice daily  -Bradycardia is improving.  Patient had good chronotropic competence with increasing heart rate during exercise.  TSH was normal. Will avoid AV shar blocking agents  -Patient is planned for heart monitor placement after discharge at cardiologist office to monitor for occult A. fib  -DVT prophylaxis with subcutaneous Lovenox  -CODE STATUS is full code    Discharge Planning: Acute rehab placement pending    Rosemary Carlin MD   03/11/21   13:17 CST            Electronically signed by Rosemary Carlin MD at 03/11/21 1331       Medical Student Notes (most recent note)    No notes of this type exist for this encounter.            Consult Notes (most recent note)      Elizabeth Poole MD at 03/09/21 1256      Consult Orders    1. Inpatient Cardiology Consult [[id number]] ordered by Christopher Alvarez MD at 03/09/21 1111                   [facility] Cardiology Consult    Referring Provider: Quinton Olivier, *      Reason for Consultation: bradycardia    Patient Care Team:  Debra Wilkes MD as PCP - General (Family Medicine)    Chief complaint   Chief Complaint   Patient presents with   • Balance Issues       Subjective .     History of present illness:     Ms. Roe is a 75 year old patient who presented to [facility] with stroke like symptoms. She has underwent neurology evaluation with CVA diagnosis. MRI 3/7/21:  Acute 1.5 cm infarct left side of the renny.  Old 1.9 cm infarct anterior aspect of the left basal ganglia.  Old  thalamic and basal ganglia lacunar infarcts.  Minimal small vessel disease.  Cerebral atrophy.  Cardiology was consulted today due to bradycardia. She has had HR's down in to the 30s. EKG is sinus bradycardia. She had appropriate HR response to exercise (therapy).   Her affect is very flat and she is working through some aphasia. She appears to understand what is going on around her.       Review of Systems  Review of Systems   Unable to perform ROS: Acuity of condition   - CVA with aphasia, flat affect    History  Past Medical History:   Diagnosis Date   • Heart murmur    • Hypertension    ,   Past Surgical History:   Procedure Laterality Date   • CHOLECYSTECTOMY     • CLUB FOOT RELEASE     • HYSTERECTOMY     ,   Family History   Problem Relation Age of Onset   • Thyroid disease Mother    • Cancer Father    • Thyroid disease Sister    • Cancer Paternal Aunt    • Cancer Paternal Uncle    • Heart disease Paternal Uncle    • Diabetes Maternal Grandmother    • Diabetes Maternal Grandfather    ,   Social History     Tobacco Use   • Smoking status: Never Smoker   • Smokeless tobacco: Never Used   Substance Use Topics   • Alcohol use: Never   • Drug use: Never   ,   Medications Prior to Admission   Medication Sig Dispense Refill Last Dose   • cloNIDine (CATAPRES) 0.1 MG tablet Take 0.1 mg by mouth 2 (Two) Times a Day As Needed. Uses prn      • predniSONE (DELTASONE) 20 MG tablet 1 by mouth 3 times a day for 4 days one by mouth twice a day for 4 days, then 1 by mouth daily for 4 days. 24 tablet 0    • VITAMIN D, CHOLECALCIFEROL, PO Take 5,000 Units by mouth Daily.         ALLERGIES  Novocain [procaine]    The following portions of the patient's history were reviewed and updated as appropriate: allergies, current medications, past family history, past medical history, past social history, past surgical history and problem list.     Old and outside records reviewed (if available) and pertinent information is included in the  "objective data.     Objective     Vital Sign Min/Max for last 24 hours  Temp  Min: 96.7 °F (35.9 °C)  Max: 98.6 °F (37 °C)   BP  Min: 141/61  Max: 177/86   Pulse  Min: 42  Max: 63   Resp  Min: 18  Max: 18   SpO2  Min: 95 %  Max: 100 %   Flow (L/min)  Min: 2  Max: 2   Weight  Min: 75.6 kg (166 lb 10.7 oz)  Max: 75.6 kg (166 lb 10.7 oz)     Flowsheet Rows      First Filed Value   Admission Height  154.9 cm (61\") Documented at 03/07/2021 0929   Admission Weight  80 kg (176 lb 6.4 oz) Documented at 03/07/2021 0929             Physical Exam:  Physical Exam  HENT:      Head: Normocephalic.   Eyes:      Pupils: Pupils are equal, round, and reactive to light.   Cardiovascular:      Rate and Rhythm: Regular rhythm. Bradycardia present.   Pulmonary:      Effort: Pulmonary effort is normal.      Breath sounds: Normal breath sounds.   Abdominal:      General: Abdomen is flat.      Palpations: Abdomen is soft.   Musculoskeletal:      Right lower leg: No edema.      Left lower leg: No edema.   Skin:     General: Skin is warm and dry.   Neurological:      Mental Status: She is alert.      Motor: Weakness present.            Results Reviewed by myself:     Results from last 7 days   Lab Units 03/08/21 0311 03/07/21  0932 03/03/21  1215   SODIUM mmol/L 141 140  --    POTASSIUM mmol/L 3.8 4.2  --    CHLORIDE mmol/L 110* 106  --    CO2 mmol/L 24.0 27.0  --    BUN mg/dL 20 30* 36*   CREATININE mg/dL 0.79 0.84 0.99   CALCIUM mg/dL 11.2* 11.9*  --    BILIRUBIN mg/dL  --  0.5  --    ALK PHOS U/L  --  58  --    ALT (SGPT) U/L  --  20  --    AST (SGOT) U/L  --  14  --    GLUCOSE mg/dL 77 75  --        Estimated Creatinine Clearance: 58.2 mL/min (by C-G formula based on SCr of 0.79 mg/dL).          Results from last 7 days   Lab Units 03/08/21 0311 03/07/21  0932   WBC 10*3/mm3 9.24 11.18*   HEMOGLOBIN g/dL 14.3 14.1   PLATELETS 10*3/mm3 237 278             Lab Results   Component Value Date    TROPONINT <0.010 03/07/2021     Lab Results   "   Component Value Date    PROBNP 623.1 03/07/2021       I/O last 3 completed shifts:  In: 4607.7 [P.O.:1680; I.V.:2927.7]  Out: 3100 [Urine:3100]    Cardiographics  ECG/EMG Results (last 24 hours)     Procedure Component Value Units Date/Time    ECG 12 Lead [[id number]] Collected: 03/09/21 1125     Updated: 03/09/21 1139     QT Interval 480 ms      QTC Interval 405 ms     Narrative:      Test Reason : Bradycardia  Blood Pressure : **/** mmHG  Vent. Rate : 043 BPM     Atrial Rate : 043 BPM     P-R Int : 130 ms          QRS Dur : 100 ms      QT Int : 480 ms       P-R-T Axes : 040 004 102 degrees     QTc Int : 405 ms    Marked sinus bradycardia  ST &  T wave abnormality, consider lateral ischemia  Abnormal ECG  When compared with ECG of 07-MAR-2021 09:30,  T wave inversion now evident in Lateral leads    Referred By:             Confirmed By:           Results for orders placed during the hospital encounter of 03/07/21    Adult Transthoracic Echo Complete W/ Cont if Necessary Per Protocol    Interpretation Summary  · Left ventricular wall thickness is consistent with mild to moderate concentric hypertrophy.  · Estimated left ventricular EF = 61% Left ventricular ejection fraction appears to be 61 - 65%. Left ventricular systolic function is normal.  · Left ventricular diastolic function is consistent with (grade I) impaired relaxation.  · Left atrial volume is mildly increased.  · Saline test results are negative.      Adult Transthoracic Echo Complete W/ Cont if Necessary Per Protocol    Addendum Date: 3/8/2021    · Left ventricular wall thickness is consistent with mild to moderate concentric hypertrophy. · Estimated left ventricular EF = 61% Left ventricular ejection fraction appears to be 61 - 65%. Left ventricular systolic function is normal. · Left ventricular diastolic function is consistent with (grade I) impaired relaxation. · Left atrial volume is mildly increased. · Saline test results are negative.      CT  Head Without Contrast    Result Date: 3/7/2021  Old infarct left basal ganglia, caudate nucleus. Old lacunar infarct right thalamus. CT brain without contrast is otherwise unremarkable. There are no acute changes. Electronically signed by:  Favian Pittman MD  3/7/2021 11:25 AM CST Workstation: [id number]    MRI Brain Without Contrast    Result Date: 3/7/2021  CONCLUSION: Acute 1.5 cm infarct left side of the renny. Old 1.9 cm infarct anterior aspect of the left basal ganglia. Old thalamic and basal ganglia lacunar infarcts. Minimal small vessel disease. Cerebral atrophy. Report called at approximately 1:50 PM [facility]. [id number] Electronically signed by:  Carlos Keith MD  3/7/2021 1:38 PM [facility] Workstation: [id number] Chest 1 View    Result Date: 3/7/2021  No evidence of active disease. Electronically signed by:  Favian Pittman MD  3/7/2021 10:06 AM CST Workstation: [id number]    MRI Internal Auditory Canal With Wo    Result Date: 3/3/2021  Involutional, atrophic changes. Periventricular foci of increased signal intensity, chronic small vessel ischemic changes. Old left basal ganglia, caudate nucleus infarct. Additional smaller old lacunar infarcts right  basal ganglia and thalamus. Mild dilatation, ectasia portions of the basilar artery. MRI brain with and without contrast is otherwise unremarkable. Unremarkable MRI examination of the posterior fossa cerebellopontine angles and internal auditory canals. No posterior fossa masses. No evidence of acoustic neuroma. IMPRESSION:  Unremarkable MRI of the brain with and without contrast. Electronically signed by:  Favian Pittman MD  3/3/2021 5:23 PM CST Workstation: [id number]    CT Angiogram Carotids    Result Date: 3/7/2021  Unremarkable CT angiography of the brain. Unremarkable CT angiography of the neck. Note is made of multiple bilateral thyroid nodules, the largest nodule seen in the right side and measuring 2.4 cm and can be evaluated electively with an ultrasound of the thyroid  Electronically signed by:  Christian Davis MD  3/7/2021 4:43 PM CST Workstation: [id number]    CT Angiogram Head    Result Date: 3/7/2021  Unremarkable CT angiography of the brain. Unremarkable CT angiography of the neck. Note is made of multiple bilateral thyroid nodules, the largest nodule seen in the right side and measuring 2.4 cm and can be evaluated electively with an ultrasound of the thyroid Electronically signed by:  Christian Davis MD  3/7/2021 4:43 PM CST Workstation: [id number]      Intake/Output       03/08/21 0700 - 03/09/21 0659    Intake (ml) 3736.3    Output (ml) 1700    Net (ml) 2036.3    Last Weight  75.6 kg (166 lb 10.7 oz)            Assessment/Plan       Cerebrovascular accident (CVA) (CMS/HCC)  - neuro following      Sinus Bradycardia  - normal chronotropic response to activity.   - Will need 30 day event monitor as outpatient.  Felt a loop would be indicated, but there may be insurance issues regarding a monitor first.   - She is apparently going to a rehab facility to recover from stroke.   - was on Cardene for HTN. No AV shar blocking medications.   - will check TSH, T4, T3.      Disposition: per attending.     I discussed the patient's findings and my recommendations with patient and nursing staff and Dr. Poole             This document has been electronically signed by FIDEL Scott on March 9, 2021 12:56 CST      Electronically signed by FIDEL Scott, 03/09/21, 12:56 PM CST.    I personally saw and examined Josi Roe after the APRN.  I personally performed a history and physical examination of the patient.  I personally reviewed independent findings and plan of care.  I discussed management with the APRN.  I agree with the APRN's documentation.    This is a 75-year-old lady with no significant past medical history, reported that she has hypertension which she takes on her clonidine only on as needed basis who presented to the hospital with the acute pontine  stroke.  We were consulted for further evaluation for bradycardia.  On my visit patient lying comfortably in bed without acute distress.  She has still has some weakness on the right side of the face but otherwise no focal deficits.    Denies any prior history of arrhythmias, no history of passing out or syncopal episodes or lightheadedness dizziness.    Vitals:    21 0625 21 0800 21 0900 21 1200   BP:  (!) 167/111  170/79   BP Location:  Left arm  Left arm   Patient Position:  Lying  Lying   Pulse: (!) 42 (!) 43 (!) 48 (!) 44   Resp:  18  18   Temp:  98.6 °F (37 °C)  98.4 °F (36.9 °C)   TempSrc:  Oral  Axillary   SpO2: 96% 99%  100%   Weight:       Height:           Labs reviewed.  TSH was normal  Lipid panel reviewed    EKG and telemetry reviewed    1.  Sinus bradycardia:  Asymptomatic and hemodynamically stable.  She does have normal chronotropic competence, with exercise her heart rate up to 60s.  TSH was normal  Echocardiogram with hyperdynamic systolic function and grade 1 diastolic dysfunction, left atrial volume is slightly enlarged.  We will continue to monitor at this point.  Since that she is asymptomatic and has good chronotropic competence medication for pacemaker at this point.  Avoid any AV shar blocking agents  Optimal blood pressure control    She is being planned for heart monitor to rule out A. fib in the setting of her CVA    2.  Hypertension:  On losartan I recommend starting amlodipine 5 mg as well.              Electronically signed by Elizabeth Poole MD, 21, 4:25 PM CST.                  Electronically signed by Elizabeth Poole MD at 21 1630          Physical Therapy Notes (most recent note)      Yong Brizuela PTA at 21 1245  Version 1 of 1         Acute Care - Physical Therapy Treatment Note  [facility]     Patient Name: Josi Roe  : 1945  MRN: [id number]  Today's Date: 3/11/2021           PT Assessment (last 12 hours)         PT Evaluation and Treatment     [facility] Name 03/11/21 1040          Physical Therapy Time and Intention    Subjective Information  no complaints  -     Document Type  therapy note (daily note)  -     Mode of Treatment  individual therapy;physical therapy  -     Patient Effort  good  -[facility] Name 03/11/21 1040          General Information    Patient Profile Reviewed  yes  -     Existing Precautions/Restrictions  fall  -[facility] Name 03/11/21 1040          Cognition    Orientation Status (Cognition)  oriented x 4  -[facility] Name 03/11/21 1040          Pain Scale: Numbers Pre/Post-Treatment    Pretreatment Pain Rating  0/10 - no pain  -     Posttreatment Pain Rating  0/10 - no pain  -[facility] Name 03/11/21 1040          Bed Mobility    Supine-Sit [place] (Bed Mobility)  standby assist  -     Sit-Supine [place] (Bed Mobility)  standby assist  -[facility] Name 03/11/21 1040          Transfers    Transfers  sit-stand transfer;stand-sit transfer;bed-chair transfer  -     Bed-Chair [place] (Transfers)  contact guard  -     Chair-Bed [place] (Transfers)  contact guard  -     Assistive Device (Bed-Chair Transfers)  walker, front-wheeled  -     Sit-Stand [place] (Transfers)  contact guard;verbal cues  -     Stand-Sit [place] (Transfers)  contact guard;verbal cues  -[facility] Name 03/11/21 1040          Chair-Bed Transfer    Assistive Device (Chair-Bed Transfers)  walker, front-wheeled  -[facility] Name 03/11/21 1040          Sit-Stand Transfer    Assistive Device (Sit-Stand Transfers)  walker, front-wheeled  -[facility] Name 03/11/21 1040          Stand-Sit Transfer    Assistive Device (Stand-Sit Transfers)  walker, front-wheeled  -[facility] Name 03/11/21 1040          Gait/Stairs (Locomotion)    [place] Level (Gait)  minimum assist (75% patient effort)  -     Assistive Device (Gait)  walker, front-wheeled  -     Distance in Feet (Gait)  40'  -      Pattern (Gait)  step-to  -JA     Deviations/Abnormal Patterns (Gait)  ataxic;base of support, wide;gait speed decreased  -     Right Sided Gait Deviations  knee buckling, right side slight   -JA     Comment (Gait/Stairs)  Upon return to room pt. bed observed as soiled with urine, pt. transferred to bedside chair this a.m. while bed changed   -     Row Name 03/11/21 1040          Safety Issues, Functional Mobility    Impairments Affecting Function (Mobility)  endurance/activity tolerance;balance;coordination  -     Row Name 03/11/21 1040          Vital Signs    Pre Systolic BP Rehab  151  -JA     Pre Treatment Diastolic BP  74  -JA     Pretreatment Heart Rate (beats/min)  59  -JA     Pre Patient Position  Supine  -JA     Intra Patient Position  Standing  -JA     Post Patient Position  Supine  -     Row Name 03/11/21 1040          Bed Mobility Goal 1 (PT)    Activity/Assistive Device (Bed Mobility Goal 1, PT)  bed mobility activities, all  -JA     [place] Level/Cues Needed (Bed Mobility Goal 1, PT)  independent;modified independence  -JA     Time Frame (Bed Mobility Goal 1, PT)  by discharge  -JA     Progress/Outcomes (Bed Mobility Goal 1, PT)  goal not met  -[facility] Name 03/11/21 1040          Transfer Goal 1 (PT)    Activity/Assistive Device (Transfer Goal 1, PT)  bed-to-chair/chair-to-bed  -JA     [place] Level/Cues Needed (Transfer Goal 1, PT)  independent;modified independence  -JA     Time Frame (Transfer Goal 1, PT)  by discharge  -JA     Progress/Outcome (Transfer Goal 1, PT)  goal not met  -[facility] Name 03/11/21 1040          Gait Training Goal 1 (PT)    Activity/Assistive Device (Gait Training Goal 1, PT)  gait (walking locomotion);assistive device use  -JA     [place] Level (Gait Training Goal 1, PT)  modified independence  -JA     Distance (Gait Training Goal 1, PT)  150 ft or more  -     Time Frame (Gait Training Goal 1, PT)  by discharge  -     Row Name 03/11/21 1040           Stairs Goal 1 (PT)    Activity/Assistive Device (Stairs Goal 1, PT)  ascending stairs;descending stairs  -     [place] Level/Cues Needed (Stairs Goal 1, PT)  modified independence;independent  -JA     Number of Stairs (Stairs Goal 1, PT)  2 or more  -JA     Time Frame (Stairs Goal 1, PT)  by discharge  -     Progress/Outcome (Stairs Goal 1, PT)  goal not met  -     Row Name 03/11/21 1040          Patient Education Goal (PT)    Activity (Patient Education Goal, PT)  pt will be skilled w/ use of FWRW for safety  -     [place]/Cues/Accuracy (Memory Goal 2, PT)  demonstrates adequately  -     Time Frame (Patient Education Goal, PT)  by discharge  -     Progress/Outcome (Patient Education Goal, PT)  goal not met  -     Row Name 03/11/21 1040          Positioning and Restraints    Pre-Treatment Position  in bed  -     Post Treatment Position  bed  -     In Bed  supine;call light within reach;encouraged to call for assist;exit alarm on  -     Row Name 03/11/21 1040          Progress Summary (PT)    Progress Toward Functional Goals (PT)  progress toward functional goals as expected  -       User Key  (r) = Recorded By, (t) = Taken By, (c) = Cosigned By    Initials Name Provider Type    Yong Whitney, [facility] Physical Therapy Assistant        Physical Therapy Education                 Title: PT OT SLP Therapies (Done)     Topic: Physical Therapy (Done)     Point: Mobility training (Done)     Learning Progress Summary           Patient Acceptance, D,E, VU,NR by GB at 3/8/2021 1258    Comment: POC, need for more control in use of FWRW, rec assist at home if d/c home; may benefit from skilled care before home                   Point: Home exercise program (Done)     Learning Progress Summary           Patient Acceptance, D,E, VU,NR by GB at 3/8/2021 1258    Comment: POC, need for more control in use of FWRW, rec assist at home if d/c home; may benefit from skilled care before home                    Point: Body mechanics (Done)     Learning Progress Summary           Patient Acceptance, D,E, VU,NR by  at 3/8/2021 1258    Comment: POC, need for more control in use of FWRW, rec assist at home if d/c home; may benefit from skilled care before home                   Point: Precautions (Done)     Learning Progress Summary           Patient Acceptance, D,E, VU,NR by  at 3/8/2021 1258    Comment: POC, need for more control in use of FWRW, rec assist at home if d/c home; may benefit from skilled care before home                               User Key     Initials Effective Dates Name Provider Type Discipline     04/03/18 -  Mounika Anand, PT Physical Therapist PT              PT Recommendation and Plan  Anticipated Discharge Disposition (PT): inpatient rehabilitation facility, skilled nursing facility  Progress Summary (PT)  Progress Toward Functional Goals (PT): progress toward functional goals as expected  Plan of Care Reviewed With: patient  Progress: improving  Outcome Summary: Pt. incontinent of bladder, but good effort with gt & mobility. Pt. transferred sup-sit SBA, sit-stand-sit CGA with RW, amb. 40' with RW CGA occasional v.c.'s for AD proximity especially with turns, pt. transferred bed-chair-bed CGA, sit-sup SBA. Cont. to mobilize       Time Calculation:   PT Charges     Row Name 03/11/21 1245             Time Calculation    Start Time  1040  -JOSÉ MIGUEL      Stop Time  1118  -JOSÉ MIGUEL      Time Calculation (min)  38 min  -JOSÉ MIGUEL         Time Calculation- PT    Total Timed Code Minutes- PT  38 minute(s)  -JOSÉ MIGUEL        User Key  (r) = Recorded By, (t) = Taken By, (c) = Cosigned By    Initials Name Provider Type    Yong Whitney PTA Physical Therapy Assistant        Therapy Charges for Today     Code Description Service Date Service Provider Modifiers Qty    91838572638 HC GAIT TRAINING EA 15 MIN 3/10/2021 Yong Brizuela PTA GP 1    06023993282 HC PT THER PROC EA 15 MIN 3/10/2021  Yong Brizuela, PTA GP 1    16669855891 HC GAIT TRAINING EA 15 MIN 3/11/2021 Yong Brizuela, PTA GP 1    68773375419 HC PT THERAPEUTIC ACT EA 15 MIN 3/11/2021 Yong Brizuela, PTA GP 2          PT G-Codes  Outcome Measure Options: 9 [place]  AM-PAC 6 Clicks Score (PT): 18  AM-PAC 6 Clicks Score (OT): 19  Modified [place] Scale: 4 - Moderately severe disability.  Unable to walk without assistance, and unable to attend to own bodily needs without assistance.    Yong Brizuela PTA  3/11/2021      Electronically signed by Yong Brizuela PTA at 21 1245          Occupational Therapy Notes (most recent note)      Keyla Rendon COTA/L at 21 1241        Goal Outcome Evaluation:  Plan of Care Reviewed With: patient  Progress: improving  Outcome Summary: Pt tolerated tx well this date. Pt was SBA with bed mobility. Pt completed an ADL. Pt gave good effort with UE ther ex. Continue OT POC.    Electronically signed by Keyla Rendon COTA/L at 21 1241          Speech Language Pathology Notes (most recent note)      Ly Maynard MS CCC-SLP at 21 0956          Inpatient Rehabilitation - Speech Language Pathology   Swallow Initial Evaluation/Discharge [facility]     Patient Name: Josi Roe  : 1945  MRN: [id number]  Today's Date: 3/8/2021               Admit Date: 3/7/2021     ST evaluation completed this date. Pt passed the nursing swallow assessment and was started on a regular diet and thin liquids. Pt was finishing am meal upon SLP arrival. Pt completed meal with no concerns. No s/s of aspiration noted. Pt stated that she had no diet restrictions from home. Pt lives alone. Slurred speech was noted upon pt arrival in ED but that has since resolved. Pt was administered the SLUMs with a score of 28/30. No concerns noted for cognition, speech, or language. This is an evaluation only. Continue current diet of regular textures and thin liquids. Pt in  agreement. RN aware.    Ly Maynard MS CCC-SLP    Visit Dx:    ICD-10-CM ICD-9-CM   1. Cerebrovascular accident (CVA), unspecified mechanism (CMS/HCC)  I63.9 434.91   2. Hypertensive emergency  I16.1 401.9     Patient Active Problem List   Diagnosis   • Cerebrovascular accident (CVA) (CMS/HCC)     Past Medical History:   Diagnosis Date   • Heart murmur    • Hypertension      Past Surgical History:   Procedure Laterality Date   • CHOLECYSTECTOMY     • CLUB FOOT RELEASE     • HYSTERECTOMY            SWALLOW EVALUATION (last 72 hours)      SLP Adult Swallow Evaluation     Row Name 03/08/21 0852                   Rehab Evaluation    Document Type  evaluation  -EA        Subjective Information  no complaints  -EA        Patient Observations  alert;cooperative;agree to therapy  -EA        Patient/Family/Caregiver Comments/Observations  none   -EA        Care Plan Review  care plan/treatment goals reviewed  -EA        Patient Effort  good  -EA        Comment  Pt passed nursing swallow assessment and was upgraded to regular diet. Pt was completed am meal upon SLP entering.   -EA           General Information    Patient Profile Reviewed  yes  -EA        Pertinent History Of Current Problem  Hx of hypertension; no hx of dysphagia   -EA        Current Method of Nutrition  regular textures;thin liquids  -EA        Precautions/Limitations, Vision  WFL;for purposes of eval  -EA        Precautions/Limitations, Hearing  hearing impairment, right;WFL;for purposes of eval  -EA        Prior Level of Function-Communication  WFL  -EA        Prior Level of Function-Swallowing  no diet consistency restrictions  -EA        Plans/Goals Discussed with  patient  -EA        Barriers to Rehab  none identified  -EA        Patient's Goals for Discharge  return home  -EA           Pain    Additional Documentation  Pain Scale: FACES Pre/Post-Treatment (Group)  -EA           Pain Scale: FACES Pre/Post-Treatment    Pain: FACES Scale,  Pretreatment  0-->no hurt  -EA        Posttreatment Pain Rating  0-->no hurt  -EA           Oral Motor Structure and Function    Dentition Assessment  upper dentures/partial in place;natural, present and adequate right partial   -EA        Secretion Management  WNL/WFL  -EA        Mucosal Quality  moist, healthy  -EA        Volitional Swallow  WFL  -EA        Volitional Cough  WFL  -EA           General Eating/Swallowing Observations    Respiratory Support Currently in Use  room air  -EA        Eating/Swallowing Skills  self-fed  -EA        Positioning During Eating  upright 90 degree;upright in bed  -EA        Utensils Used  spoon;cup;straw  -EA        Consistencies Trialed  regular textures;soft textures;thin liquids  -EA           Clinical Swallow Eval    Oral Prep Phase  WFL  -EA        Oral Transit  WFL  -EA        Oral Residue  WFL  -EA        Pharyngeal Phase  WFL  -EA        Esophageal Phase  unremarkable  -EA           Clinical Impression    SLP Swallowing Diagnosis  swallow WFL  -EA        Functional Impact  no impact on function  -EA        Swallow Criteria for Skilled Therapeutic Interventions Met  not appropriate;no problems identified which require skilled intervention  -EA           Recommendations    Therapy Frequency (Swallow)  evaluation only  -EA        SLP Diet Recommendation  regular textures;thin liquids  -EA        SLP Rec. for Method of Medication Administration  as tolerated  -EA        Anticipated Discharge Disposition (SLP)  home;unknown  -EA          User Key  (r) = Recorded By, (t) = Taken By, (c) = Cosigned By    Initials Name Effective Dates    Ly Mitchell MS CCC-SLP 08/09/20 -           EDUCATION  The patient has been educated in the following areas:   Cognitive Impairment Dysphagia (Swallowing Impairment).    SLP Recommendation and Plan  SLP Swallowing Diagnosis: swallow WFL  SLP Diet Recommendation: regular textures, thin liquids           Swallow Criteria for Skilled  Therapeutic Interventions Met: not appropriate, no problems identified which require skilled intervention  Anticipated Discharge Disposition (SLP): home, unknown     Therapy Frequency (Swallow): evaluation only              Anticipated Discharge Disposition (SLP): home, unknown             Plan of Care Reviewed With: patient  Progress: improving  Outcome Summary: ST evaluation completed this date. Pt passed the nursing swallow assessment and was started on a regular diet and thin liquids. Pt was finishing am meal upon SLP arrival. Pt completed meal with no concerns. No s/s of aspiration noted. Pt stated that she had no diet restrictions from home. Pt lives alone. Slurred speech was noted upon pt arrival in ED but that has since resolved. Pt was administered the SLUMs with a score of 28/30. No concerns noted for cognition, speech, or language. This is an evaluation only. Continue current diet of regular textures and thin liquids. Pt in agreement. RN aware.             Time Calculation:   Time Calculation- SLP     Row Name 03/08/21 0942             Time Calculation- SLP    SLP Start Time  0852  -EA      SLP Stop Time  0942  -EA      SLP Time Calculation (min)  50 min  -EA      Total Timed Code Minutes- SLP  50 minute(s)  -EA      SLP Received On  03/08/21  -EA      SLP Goal Re-Cert Due Date  03/22/21  -EA        User Key  (r) = Recorded By, (t) = Taken By, (c) = Cosigned By    Initials Name Provider Type    Ly Mitchell MS CCC-SLP Speech and Language Pathologist          Therapy Charges for Today     Code Description Service Date Service Provider Modifiers Qty    09080493565 HC ST EVAL SPEECH AND PROD W LANG  2 3/8/2021 Ly Maynard MS CCC-SLP GN 1    07232803531 HC ST EVAL ORAL PHARYNG SWALLOW 1 3/8/2021 Ly Maynard MS CCC-SLP GN 1               SLP Discharge Summary  Anticipated Discharge Disposition (SLP): home, unknown    MS LISA Adorno  3/8/2021    Electronically signed  by Ly Maynard MS CCC-SLP at 03/08/21 0939

## 2021-03-15 NOTE — PAYOR COMM NOTE
"Alyssa Pringle  [facility]  P: [phone]  F: [phone]    [facility] [[email or web address]]#[id number]    Josi Roe (75 y.o. Female)     Date of Birth Social Security Number Address Home Phone MRN    1945  [address]  [place] [id number] [phone] [id number]    Jewish Marital Status          Jewish        Admission Date Admission Type Admitting Provider Attending Provider Department, Room/Bed    3/7/21 Emergency Echendu, Christopher NIEVES MD  [facility] 3 EAST, 376/1    Discharge Date Discharge Disposition Discharge Destination        3/12/2021 Rehab Facility or Unit (DC - External)              Attending Provider: (none)   Allergies: Novocain [Procaine]    Isolation: None   Infection: None   Code Status: Prior    Ht: 154.9 cm (61\")   Wt: 80.5 kg (177 lb 6.4 oz)    Admission Cmt: None   Principal Problem: None                Active Insurance as of 3/7/2021     Primary Coverage     Payor Plan Insurance Group Employer/Plan Group    HUMANA MEDICARE REPLACEMENT HUMANA MEDICARE REPLACEMENT [id number]     Payor Plan Address Payor Plan Phone Number Payor Plan Fax Number Effective Dates    [address] [phone]  8/1/2020 - None Entered    [facility] [id number]       Subscriber Name Subscriber Birth Date Member ID       JOSI ORE 1945 [id number]                 Emergency Contacts      (Rel.) Home Phone Work Phone Mobile Phone    ESAN HOLBROOK (Daughter) [phone] -- --               Discharge Summary      Rosemary Carlin MD at 03/12/21 1044              [facility] Medicine Services  DISCHARGE SUMMARY       Date of Admission: 3/7/2021  Date of Discharge:  3/12/2021  Primary Care Physician: Debra Wilkes MD    Presenting Problem/History of Present Illness:  Hypertensive emergency [I16.1]  Cerebrovascular accident (CVA), unspecified mechanism (CMS/HCC) [I63.9]     Final Discharge " Diagnoses:  Active Hospital Problems    Diagnosis    • Cerebrovascular accident (CVA) (CMS/[facility])    Essential hypertension  Sinus bradycardia    Consults:   Consults     Date and Time Order Name Status Description    3/9/2021 11:11 AM Inpatient Cardiology Consult Completed           Procedures Performed: None                Pertinent Test Results:   Procedure Component Value Units Date/Time   CT Angiogram Carotids [[id number]] Tobias as Reviewed   Order Status: Completed Collected: 03/07/21 1520    Updated: 03/07/21 1644   Narrative:     PROCEDURE: CT HEAD ANGIOGRAPHY WITH IV CONTRAST, CT NECK   ANGIOGRAPHY WITH IV CONTRAST     CLINICAL HISTORY: L pontine stroke, I63.9 Cerebral infarction,   unspecified I16.1 Hypertensive emergency     INDICATION: Same as above     Comparison: MRI of the brain done on the same day     TECHNIQUE:     CT angiography of the head and neck neck was done with   intravenous contrast followed by 3-D rendering of the   intracranial and neck arterial vasculature.     The patient was injected with 90 mL of Isovue-370 intravenously,   without any documented immediate adverse reactions.     This exam was performed according to the departmental   dose-optimization program which includes automated exposure   control, adjustment of the mA and/or kV according to patient size   and/or use of iterative reconstruction technique.       NASCET criteria was utilized in evaluation of arterial stenosis.     FINDINGS:     CTA OF THE HEAD:     There is no evidence of hemodynamically significant stenosis or a   focal aneurysm formation in the visualized intracranial portion   the bilateral internal carotid arteries, carotid siphon region,   bilateral anterior, middle and posterior cerebral arteries and   their branches.     The basilar tip is normal.     Antegrade flow is seen in the bilateral vertebral arteries.     Evaluation of the superior sagittal sinus, vein of Minh region,   torcula, straight sinus,  bilateral internal cerebral veins,   bilateral sigmoid sinuses and the jugular foramina bilaterally do   not show any gross abnormalities.     CTA OF THE NECK:     There is no evidence of hemodynamically significant stenosis or a   focal aneurysm formation in the bilateral common carotid and the   bilateral internal carotid arteries.     Tortuosity of the bilateral internal carotid arteries in the   cervical region is noted     Antegrade flow is seen in the bilateral vertebral arteries.     The takeoff of the great vessels from the aortic arch region is   unremarkable.     The visualized cervical spine shows multilevel degenerative   change .     The prevertebral soft tissues unremarkable.     The laryngotracheal airway is widely patent. Note is made of   multiple bilateral thyroid nodules, the largest nodule seen in   the right side and measuring 2.4 cm and can be evaluated   electively with an ultrasound of the thyroid     The visualized lung apices are unremarkable .    Impression:       Unremarkable CT angiography of the brain.   Unremarkable CT angiography of the neck.   Note is made of multiple bilateral thyroid nodules, the largest   nodule seen in the right side and measuring 2.4 cm and can be   evaluated electively with an ultrasound of the thyroid     Electronically signed by:  Christian Davis MD  3/7/2021 4:43 PM [facility]   Workstation: [employer]-   CT Angiogram Head [[id number]] Tobias as Reviewed   Order Status: Completed Collected: 03/07/21 1520    Updated: 03/07/21 1644   Narrative:     PROCEDURE: CT HEAD ANGIOGRAPHY WITH IV CONTRAST, CT NECK   ANGIOGRAPHY WITH IV CONTRAST     CLINICAL HISTORY: L pontine stroke, I63.9 Cerebral infarction,   unspecified I16.1 Hypertensive emergency     INDICATION: Same as above     Comparison: MRI of the brain done on the same day     TECHNIQUE:     CT angiography of the head and neck neck was done with   intravenous contrast followed by 3-D rendering of the   intracranial  and neck arterial vasculature.     The patient was injected with 90 mL of Isovue-370 intravenously,   without any documented immediate adverse reactions.     This exam was performed according to the departmental   dose-optimization program which includes automated exposure   control, adjustment of the mA and/or kV according to patient size   and/or use of iterative reconstruction technique.       NASCET criteria was utilized in evaluation of arterial stenosis.     FINDINGS:     CTA OF THE HEAD:     There is no evidence of hemodynamically significant stenosis or a   focal aneurysm formation in the visualized intracranial portion   the bilateral internal carotid arteries, carotid siphon region,   bilateral anterior, middle and posterior cerebral arteries and   their branches.     The basilar tip is normal.     Antegrade flow is seen in the bilateral vertebral arteries.     Evaluation of the superior sagittal sinus, vein of Minh region,   torcula, straight sinus, bilateral internal cerebral veins,   bilateral sigmoid sinuses and the jugular foramina bilaterally do   not show any gross abnormalities.     CTA OF THE NECK:     There is no evidence of hemodynamically significant stenosis or a   focal aneurysm formation in the bilateral common carotid and the   bilateral internal carotid arteries.     Tortuosity of the bilateral internal carotid arteries in the   cervical region is noted     Antegrade flow is seen in the bilateral vertebral arteries.     The takeoff of the great vessels from the aortic arch region is   unremarkable.     The visualized cervical spine shows multilevel degenerative   change .     The prevertebral soft tissues unremarkable.     The laryngotracheal airway is widely patent. Note is made of   multiple bilateral thyroid nodules, the largest nodule seen in   the right side and measuring 2.4 cm and can be evaluated   electively with an ultrasound of the thyroid     The visualized lung apices are  unremarkable .    Impression:       Unremarkable CT angiography of the brain.   Unremarkable CT angiography of the neck.   Note is made of multiple bilateral thyroid nodules, the largest   nodule seen in the right side and measuring 2.4 cm and can be   evaluated electively with an ultrasound of the thyroid     Electronically signed by:  Christian Davis MD  3/7/2021 4:43 PM CST   Workstation: [employer]-   MRI Brain Without Contrast [[id number]] Tobias as Reviewed   Order Status: Completed Collected: 03/07/21 1214    Updated: 03/07/21 1339   Narrative:       MRI of the brain without contrast     HISTORY: Acute neurologic deficit.     Multisequence multiplanar images of the brain were obtained   without and with contrast.     COMPARISON: March 3, 2021. Correlation CT March 7, 2021..     FINDINGS:   Minimal mucosal thickening ethmoid sinuses.     Acute 1.5 cm infarct left side of the renny.   Old 1.9 cm infarct anterior aspect of the left basal ganglia.   Old thalamic and basal ganglia lacunar infarcts.   Minimal small vessel disease.   Cerebral atrophy.   No hemorrhage.   No mass.   No abnormal enhancement.   No midline shift and no abnormal extra-axial fluid collections.   Normal signal flow voids are present in the major vessels and   venous sinuses.    Impression:     CONCLUSION:   Acute 1.5 cm infarct left side of the renny.   Old 1.9 cm infarct anterior aspect of the left basal ganglia.   Old thalamic and basal ganglia lacunar infarcts.   Minimal small vessel disease.   Cerebral atrophy.     Report called at approximately 1:50 PM CST.      Chief Complaint on Day of Discharge: None    Hospital Course:  The patient is a 75 y.o. female with history of hypertension  who presented to the ER with complaints of sudden onset of slurred speech, right-sided weakness and dizziness.      On triage, her peak blood pressure was 246/113 mmHg which did improve to 165/77 mmhg. Noncontrast CT scan of the head showed old left basal  "ganglia and caudate nucleus infarcts and old lacunar infarct in the right thalamus. MRI of the brain showed showed an acute to 1.5 cm infarct on the left side of the renny.  Patient was seen by teleneurologist and started on dual antiplatelet therapy and high-dose statin.  She also received physical and occupational therapy as well as speech therapy for stroke.  She had elevated blood pressure which was controlled with oral antihypertensive medications.  He had some bradycardia during the admission but had good chronotropic competence with increasing heart rate during exercise.  TSH was normal.  She was evaluated by cardiologist and plan for heart monitor placement after discharge to monitor for arrhythmias or occult atrial fibrillation.  She was discharged in stable condition to an inpatient rehab facility for physical rehabilitation.    Condition on Discharge: Stable    Physical Exam on Discharge:  BP (!) 193/86 (BP Location: Left arm, Patient Position: Lying)   Pulse 52   Temp 98 °F (36.7 °C) (Oral)   Resp 18   Ht 154.9 cm (61\")   Wt 80.5 kg (177 lb 6.4 oz)   SpO2 97%   BMI 33.52 kg/m²      Physical Exam  Constitutional:       General: She is not in acute distress.     Appearance: Normal appearance. She is not ill-appearing or diaphoretic.   HENT:      Head: Normocephalic and atraumatic.      Right Ear: External ear normal.      Left Ear: External ear normal.      Nose: No congestion or rhinorrhea.      Mouth/Throat:      Mouth: Mucous membranes are moist.      Pharynx: No oropharyngeal exudate or posterior oropharyngeal erythema.   Eyes:      General: No scleral icterus.     Extraocular Movements: Extraocular movements intact.      Conjunctiva/sclera: Conjunctivae normal.   Cardiovascular:      Rate and Rhythm: Normal rate and regular rhythm.      Heart sounds: Normal heart sounds. No murmur.   Pulmonary:      Effort: Pulmonary effort is normal. No respiratory distress.      Breath sounds: Normal breath " sounds. No wheezing, rhonchi or rales.   Abdominal:      General: Abdomen is flat. There is no distension.      Palpations: Abdomen is soft.      Tenderness: There is no abdominal tenderness. There is no guarding.   Musculoskeletal:         General: No swelling, tenderness or deformity.      Cervical back: Neck supple. No rigidity. No muscular tenderness.      Right lower leg: No edema.      Left lower leg: No edema.   Lymphadenopathy:      Cervical: No cervical adenopathy.   Skin:     General: Skin is warm and dry.   Neurological:      General: No focal deficit present.      Mental Status: She is alert and oriented to person, place, and time.      Cranial Nerves: No cranial nerve deficit.      Motor: Weakness present.      Comments: Power in right upper and lower limb grade 4/5.  Power in left upper and lower limbs graded 5/5.  No facial droop noted.   Psychiatric:         Mood and Affect: Mood normal.         Behavior: Behavior normal.         Thought Content: Thought content normal.     Discharge Disposition:  Rehab Facility or Unit (DC - External)    Discharge Medications:     Discharge Medications      New Medications      Instructions Start Date   amLODIPine 10 MG tablet  Commonly known as: NORVASC  Notes to patient: 03/13/2021   10 mg, Oral, Every 24 Hours Scheduled   Start Date: March 13, 2021     aspirin 325 MG tablet  Notes to patient: 03/13/2021   325 mg, Oral, Daily   Start Date: March 13, 2021     atorvastatin 80 MG tablet  Commonly known as: LIPITOR  Notes to patient: 03/13/2021   80 mg, Oral, Daily   Start Date: March 13, 2021     clopidogrel 75 MG tablet  Commonly known as: PLAVIX  Notes to patient: 03/13/2021   75 mg, Oral, Daily   Start Date: March 13, 2021     losartan 50 MG tablet  Commonly known as: COZAAR  Notes to patient: 03/12/2021   50 mg, Oral, 2 Times Daily         Continue These Medications      Instructions Start Date   VITAMIN D (CHOLECALCIFEROL) PO  Notes to patient: 03/13/2021    5,000 Units, Oral, Daily         Stop These Medications    cloNIDine 0.1 MG tablet  Commonly known as: CATAPRES     predniSONE 20 MG tablet  Commonly known as: DELTASONE            Discharge Diet:   Diet Instructions     Diet: Cardiac      Discharge Diet: Cardiac          Activity at Discharge:   Activity Instructions     Activity as Tolerated            Discharge Care Plan/Instructions:   1.  Follow-up with your primary care provider within 1 week of discharge.  2.  Follow-up with cardiologist Dr. Poole within 1 week of discharge for placement of a cardiac monitoring device in the office.    Follow-up Appointments:   No future appointments.    Test Results Pending at Discharge:     Bre Carlin MD  03/12/21  19:17 CST    Time: 38 minutes of time was spent evaluating patient and planning discharge.      Part of this note may be an electronic transcription/translation of spoken language to printed text using the Dragon Dictation system.            Electronically signed by Bre Carlin MD at 03/12/21 1924       Discharge Order (From admission, onward)     Start     Ordered    03/12/21 1038  Discharge patient  Once     Expected Discharge Date: 03/12/21    Expected Discharge Time: Morning    Discharge Disposition: Rehab Facility or Unit (DC - External)    Physician of Record for Attribution - Please select from Treatment Team: BRE CARLIN [[id number]]    Review needed by CMO to determine Physician of Record: No       Question Answer Comment   Physician of Record for Attribution - Please select from Treatment Team BRE CARLIN    Review needed by CMO to determine Physician of Record No        03/12/21 [id number]

## 2021-03-16 ENCOUNTER — TELEPHONE (OUTPATIENT)
Dept: CARDIOLOGY | Facility: CLINIC | Age: 76
End: 2021-03-16

## 2021-03-16 NOTE — TELEPHONE ENCOUNTER
Attempted to call patient to see if she could come get her monitor put on, no answer and unable to leave voicemail.

## 2021-03-17 ENCOUNTER — TELEPHONE (OUTPATIENT)
Dept: CARDIOLOGY | Facility: CLINIC | Age: 76
End: 2021-03-17

## 2021-03-17 NOTE — TELEPHONE ENCOUNTER
Called pt daughter and she asked if they could put a monitor on her in [place] because her mother is handicap as well as the stroke and it is hard for her to transport her. She said it might be possible for Friday but she couldn't guarantee.    I told her I would speak to Dr. Poole and call her back to let her know what he wants to do    She was understanding

## 2021-03-17 NOTE — TELEPHONE ENCOUNTER
Called pt daughter back. She said she would call when convenient to get her mother scheduled for a monitor.

## 2021-03-21 LAB
QT INTERVAL: 454 MS
QTC INTERVAL: 383 MS

## 2021-03-25 ENCOUNTER — TELEPHONE (OUTPATIENT)
Dept: CARDIOLOGY | Facility: CLINIC | Age: 76
End: 2021-03-25

## 2021-03-25 DIAGNOSIS — I63.39 CEREBROVASCULAR ACCIDENT (CVA) DUE TO THROMBOSIS OF OTHER CEREBRAL ARTERY (HCC): ICD-10-CM

## 2021-03-25 DIAGNOSIS — I49.9 CARDIAC ARRHYTHMIA, UNSPECIFIED CARDIAC ARRHYTHMIA TYPE: ICD-10-CM

## 2021-03-25 DIAGNOSIS — R00.2 PALPITATIONS: Primary | ICD-10-CM

## 2021-03-25 NOTE — TELEPHONE ENCOUNTER
Called patients daughter back, no answer couldn't leave voicemail.      Monitor order is placed. Needs Nurse/Ma visit

## 2021-04-30 ENCOUNTER — OFFICE VISIT (OUTPATIENT)
Dept: SURGERY | Facility: CLINIC | Age: 76
End: 2021-04-30

## 2021-04-30 VITALS
BODY MASS INDEX: 31.26 KG/M2 | SYSTOLIC BLOOD PRESSURE: 144 MMHG | DIASTOLIC BLOOD PRESSURE: 82 MMHG | HEART RATE: 84 BPM | WEIGHT: 165.6 LBS | TEMPERATURE: 97 F | HEIGHT: 61 IN

## 2021-04-30 DIAGNOSIS — E21.0 PRIMARY HYPERPARATHYROIDISM (HCC): Primary | ICD-10-CM

## 2021-04-30 PROCEDURE — 99204 OFFICE O/P NEW MOD 45 MIN: CPT | Performed by: SURGERY

## 2021-05-04 ENCOUNTER — TELEPHONE (OUTPATIENT)
Dept: CARDIOLOGY | Facility: CLINIC | Age: 76
End: 2021-05-04

## 2021-05-04 NOTE — TELEPHONE ENCOUNTER
Elizabeth Poole MD Brown, Karen M RN  No afib       The above is regarding monitor results    The patient was notified and she had no questions at this time

## 2021-05-05 ENCOUNTER — APPOINTMENT (OUTPATIENT)
Dept: ULTRASOUND IMAGING | Facility: HOSPITAL | Age: 76
End: 2021-05-05

## 2021-05-05 ENCOUNTER — APPOINTMENT (OUTPATIENT)
Dept: NUCLEAR MEDICINE | Facility: HOSPITAL | Age: 76
End: 2021-05-05

## 2021-05-09 NOTE — PROGRESS NOTES
CHIEF COMPLAINT:    Elevated calcium and elevated parathyroid levels    HISTORY OF PRESENT ILLNESS:    Josi Roe is a 75 y.o. female who was recently noted to have an elevated calcium level by her PCP.  This was followed up with testing of her parathyroid level was was also found to be elevated.  The patient was recently hospitalized for a stroke.  After brief LTAC stay she was able to be transitioned home.    She did have a DEXA scan previously in August 2020 that showed osteoporosis which is apparently untreated currently.  She has had no imaging of her neck.    Past Medical History:   Diagnosis Date   • Heart murmur    • Hypertension        Past Surgical History:   Procedure Laterality Date   • CHOLECYSTECTOMY     • CLUB FOOT RELEASE     • HYSTERECTOMY     • OOPHORECTOMY         Prior to Admission medications    Medication Sig Start Date End Date Taking? Authorizing Provider   amLODIPine (NORVASC) 10 MG tablet Take 1 tablet by mouth Daily for 90 days. 3/13/21 6/11/21 Yes Rosemary Carlin MD   aspirin 325 MG tablet Take 1 tablet by mouth Daily for 90 days. 3/13/21 6/11/21 Yes Rosemary Carlin MD   atorvastatin (LIPITOR) 80 MG tablet Take 1 tablet by mouth Daily for 90 days. 3/13/21 6/11/21 Yes Rosemary Carlin MD   clopidogrel (PLAVIX) 75 MG tablet Take 1 tablet by mouth Daily for 90 days. 3/13/21 6/11/21 Yes Rosemary Carlin MD   losartan (COZAAR) 50 MG tablet Take 1 tablet by mouth 2 (Two) Times a Day for 90 days. 3/12/21 6/10/21 Yes Rosemary Carlin MD   VITAMIN D, CHOLECALCIFEROL, PO Take 5,000 Units by mouth Daily.   Yes ProviderPeace MD       Allergies   Allergen Reactions   • Novocain [Procaine] Other (See Comments)     Passes Out       Family History   Problem Relation Age of Onset   • Thyroid disease Mother    • Cancer Father    • Thyroid disease Sister    • Cancer Paternal Aunt    • Cancer Paternal Uncle    • Heart disease Paternal Uncle    • Diabetes Maternal Grandmother   "  • Diabetes Maternal Grandfather        Social History     Socioeconomic History   • Marital status:      Spouse name: Not on file   • Number of children: Not on file   • Years of education: Not on file   • Highest education level: Not on file   Tobacco Use   • Smoking status: Never Smoker   • Smokeless tobacco: Never Used   Substance and Sexual Activity   • Alcohol use: Never   • Drug use: Never   • Sexual activity: Defer       Review of Systems   Genitourinary: Positive for enuresis.   Neurological: Positive for weakness and numbness.       Objective     /82   Pulse 84   Temp 97 °F (36.1 °C) (Temporal)   Ht 154.9 cm (61\")   Wt 75.1 kg (165 lb 9.6 oz)   BMI 31.29 kg/m²     Physical Exam  Constitutional:       General: She is not in acute distress.     Appearance: Normal appearance. She is not ill-appearing, toxic-appearing or diaphoretic.   HENT:      Head: Normocephalic and atraumatic.   Eyes:      General: No scleral icterus.        Right eye: No discharge.         Left eye: No discharge.      Extraocular Movements: Extraocular movements intact.   Cardiovascular:      Rate and Rhythm: Normal rate.   Pulmonary:      Effort: Pulmonary effort is normal. No respiratory distress.   Skin:     General: Skin is warm.   Neurological:      General: No focal deficit present.      Mental Status: She is alert and oriented to person, place, and time.   Psychiatric:         Mood and Affect: Mood normal.         Behavior: Behavior normal.         Thought Content: Thought content normal.         Judgment: Judgment normal.         DIAGNOSTIC DATA:    .6  Most recent calcium 11.2    ASSESSMENT:    Hypercalcemia with hyperparathyroidism and patient with osteoporosis    PLAN:    The patient will need sestamibi scan as well as neck ultrasound.  We referred her to endocrinology given that she also has osteoporosis.  She will follow-up with us after her studies have been performed to discuss possible need for " parathyroidectomy.          This document has been electronically signed by Kevin Stahl MD on May 9, 2021 16:31 CDT

## 2021-05-18 ENCOUNTER — HOSPITAL ENCOUNTER (OUTPATIENT)
Dept: ULTRASOUND IMAGING | Facility: HOSPITAL | Age: 76
Discharge: HOME | End: 2021-05-18
Admitting: SURGERY

## 2021-05-18 ENCOUNTER — HOSPITAL ENCOUNTER (OUTPATIENT)
Dept: NUCLEAR MEDICINE | Facility: HOSPITAL | Age: 76
Discharge: HOME | End: 2021-05-18

## 2021-05-18 DIAGNOSIS — E21.0 PRIMARY HYPERPARATHYROIDISM (HCC): ICD-10-CM

## 2021-05-18 PROCEDURE — 78070 PARATHYROID PLANAR IMAGING: CPT

## 2021-05-18 PROCEDURE — 0 TECHNETIUM SESTAMIBI: Performed by: SURGERY

## 2021-05-18 PROCEDURE — A9500 TC99M SESTAMIBI: HCPCS | Performed by: SURGERY

## 2021-05-18 PROCEDURE — 76536 US EXAM OF HEAD AND NECK: CPT

## 2021-05-18 RX ADMIN — TECHNETIUM TC 99M SESTAMIBI 1 DOSE: 1 INJECTION INTRAVENOUS at 13:43

## 2021-05-25 ENCOUNTER — OFFICE VISIT (OUTPATIENT)
Dept: SURGERY | Facility: CLINIC | Age: 76
End: 2021-05-25

## 2021-05-25 VITALS
HEART RATE: 68 BPM | OXYGEN SATURATION: 97 % | BODY MASS INDEX: 31.23 KG/M2 | DIASTOLIC BLOOD PRESSURE: 60 MMHG | WEIGHT: 165.4 LBS | SYSTOLIC BLOOD PRESSURE: 118 MMHG | TEMPERATURE: 98.4 F | HEIGHT: 61 IN

## 2021-05-25 DIAGNOSIS — E21.0 PRIMARY HYPERPARATHYROIDISM (HCC): ICD-10-CM

## 2021-05-25 DIAGNOSIS — Z09 FOLLOW UP: Primary | ICD-10-CM

## 2021-05-25 PROCEDURE — 99213 OFFICE O/P EST LOW 20 MIN: CPT | Performed by: SURGERY

## 2021-06-03 NOTE — PROGRESS NOTES
CHIEF COMPLAINT:    Chief Complaint   Patient presents with   • Follow-up       HISTORY OF PRESENT ILLNESS:    Josi Roe is a 75 y.o. female who underwent neck ultrasound and sestamibi scan to assess for parathyroid adenoma.  Both studies appeared to show an adenoma in the left inferior region.  This was discussed with the patient today.    EXAM:  Vitals:    05/25/21 1319   BP: 118/60   Pulse: 68   Temp: 98.4 °F (36.9 °C)   SpO2: 97%         No acute distress    ASSESSMENT:    Primary hyperparathyroidism with apparent left inferior adenoma    PLAN:    Given that the patient had a fairly recent stroke I would like her to have further time to recover from this.  Additionally we will have her see endocrinology.  She will see us back in 1 month to discuss possible surgery.          This document has been electronically signed by Kevin Stahl MD on Shala 3, 2021 14:29 CDT

## 2021-06-24 ENCOUNTER — OFFICE VISIT (OUTPATIENT)
Dept: SURGERY | Facility: CLINIC | Age: 76
End: 2021-06-24

## 2021-06-24 VITALS
HEIGHT: 61 IN | SYSTOLIC BLOOD PRESSURE: 130 MMHG | TEMPERATURE: 97.2 F | DIASTOLIC BLOOD PRESSURE: 82 MMHG | OXYGEN SATURATION: 96 % | BODY MASS INDEX: 30.51 KG/M2 | WEIGHT: 161.6 LBS | HEART RATE: 74 BPM

## 2021-06-24 DIAGNOSIS — E21.0 PRIMARY HYPERPARATHYROIDISM (HCC): Primary | ICD-10-CM

## 2021-06-24 PROCEDURE — 99212 OFFICE O/P EST SF 10 MIN: CPT | Performed by: SURGERY

## 2021-06-24 RX ORDER — SODIUM CHLORIDE 9 MG/ML
100 INJECTION, SOLUTION INTRAVENOUS CONTINUOUS
Status: CANCELLED | OUTPATIENT
Start: 2021-07-14

## 2021-06-24 RX ORDER — CALCIUM CARBONATE 260MG(650)
TABLET,CHEWABLE ORAL
COMMUNITY
End: 2021-06-30

## 2021-06-24 RX ORDER — ASPIRIN 81 MG/1
81 TABLET ORAL DAILY
COMMUNITY

## 2021-06-24 RX ORDER — AMLODIPINE BESYLATE 10 MG/1
10 TABLET ORAL NIGHTLY
COMMUNITY
Start: 2021-06-20 | End: 2021-12-30

## 2021-06-24 RX ORDER — BUPIVACAINE HCL/0.9 % NACL/PF 0.1 %
2 PLASTIC BAG, INJECTION (ML) EPIDURAL ONCE
Status: CANCELLED | OUTPATIENT
Start: 2021-07-14 | End: 2021-06-24

## 2021-06-24 RX ORDER — ATORVASTATIN CALCIUM 80 MG/1
80 TABLET, FILM COATED ORAL NIGHTLY
COMMUNITY
Start: 2021-06-20

## 2021-06-25 ENCOUNTER — OFFICE VISIT (OUTPATIENT)
Dept: ENDOCRINOLOGY | Facility: CLINIC | Age: 76
End: 2021-06-25

## 2021-06-25 VITALS
HEIGHT: 61 IN | WEIGHT: 160 LBS | OXYGEN SATURATION: 98 % | SYSTOLIC BLOOD PRESSURE: 150 MMHG | HEART RATE: 70 BPM | BODY MASS INDEX: 30.21 KG/M2 | DIASTOLIC BLOOD PRESSURE: 90 MMHG

## 2021-06-25 DIAGNOSIS — E21.0 PRIMARY HYPERPARATHYROIDISM (HCC): Primary | ICD-10-CM

## 2021-06-25 DIAGNOSIS — E04.2 NONTOXIC MULTINODULAR GOITER: ICD-10-CM

## 2021-06-25 DIAGNOSIS — M80.80XA OTHER OSTEOPOROSIS WITH CURRENT PATHOLOGICAL FRACTURE, INITIAL ENCOUNTER: ICD-10-CM

## 2021-06-25 PROCEDURE — 99204 OFFICE O/P NEW MOD 45 MIN: CPT | Performed by: INTERNAL MEDICINE

## 2021-06-25 RX ORDER — LOSARTAN POTASSIUM 50 MG/1
50 TABLET ORAL NIGHTLY
COMMUNITY
End: 2021-08-26 | Stop reason: SDUPTHER

## 2021-06-25 NOTE — PROGRESS NOTES
"Chief Complaint   Patient presents with   • Thyroid Problem     hyperparathyroidism         History of Present Illness    75 y.o. female w PTH mediated hypercalcemia with US and Parathyroid scan suggesting left inferior parathyroid.   She has no symptoms pertaining hypercalcemia  US also reveals multinodular goiter.  Dominant right 2.9 and left mid 1.5 cm nodule with nl TSH from March 2021 but elevated Free T4    ==========================================  Physical Exam  /90   Pulse 70   Ht 154.9 cm (61\")   Wt 72.6 kg (160 lb)   SpO2 98%   BMI 30.23 kg/m²   AOx3  No Goiter , no carotid bruit  RRR  CTA  No Edema     ==========================================    Laboratory Workup    Component      Latest Ref Rng & Units 3/29/2021 6/28/2021 6/28/2021            4:55 PM  4:55 PM   Creatinine, 24H       0.70 - 1.60 g/24 hr   0.55 (L)   Creatinine, Urine      mg/dL   28.1   Urine Volume      mL  1,950 1,950   Time (Hours)      hrs  24 24   Calcium, 24H Urine      100.0 - 300.0 mg/24 hr  275.0    Calcium, Urine      mg/dL  14.1    PTH Intact (Serial Monitor)      12 - 88 pg/mL 165.6 (H)         Lab Results   Component Value Date    WBC 9.24 03/08/2021    HGB 14.3 03/08/2021    HCT 43.0 03/08/2021    MCV 89.0 03/08/2021     03/08/2021       Lab Results   Component Value Date    GLUCOSE 77 03/08/2021    BUN 20 03/08/2021    CREATININE 0.79 03/08/2021    EGFRIFNONA 71 03/08/2021    BCR 25.3 (H) 03/08/2021    K 3.8 03/08/2021    CO2 24.0 03/08/2021    CALCIUM 11.2 (H) 03/08/2021    ALBUMIN 3.90 03/07/2021    AST 14 03/07/2021    ALT 20 03/07/2021     Thyroid Workup    Lab Results   Component Value Date    TSH 3.370 03/08/2021       Lab Results   Component Value Date    FREET4 2.04 (H) 03/08/2021    FREET4 1.18 08/07/2020       Lab Results   Component Value Date    T3FREE 2.55 03/08/2021           ==========================================      ICD-10-CM ICD-9-CM   1. Primary hyperparathyroidism (CMS/HCC)  " E21.0 252.01   -    PTH mediated hypercalcemia consistent with primary hyperparathyroidism localizing to left inferior parathyroid.    She also has Multinodular Goiter. Need repeat TSH since Free T4 was elevated.   If TSH is normal then I will ask Dr. Stahl if he would like FNA before parathyroidectomy     She is taking vitamin D 5th u daily and Vit D is 60   Adivsed her to take calcium 600 mg daily to prevent hungry bone syndrome       No orders of the defined types were placed in this encounter.               This document has been electronically signed by Alessandro Mcclelland MD on June 25, 2021 11:37 CDT

## 2021-06-28 ENCOUNTER — LAB (OUTPATIENT)
Dept: LAB | Facility: HOSPITAL | Age: 76
End: 2021-06-28

## 2021-06-28 PROCEDURE — 81050 URINALYSIS VOLUME MEASURE: CPT | Performed by: INTERNAL MEDICINE

## 2021-06-28 PROCEDURE — 82340 ASSAY OF CALCIUM IN URINE: CPT | Performed by: INTERNAL MEDICINE

## 2021-06-28 PROCEDURE — 82570 ASSAY OF URINE CREATININE: CPT | Performed by: INTERNAL MEDICINE

## 2021-06-29 LAB
CALCIUM 24H UR-MCNC: 14.1 MG/DL
CALCIUM 24H UR-MRATE: 275 MG/24 HR (ref 100–300)
COLLECT DURATION TIME UR: 24 HRS
COLLECT DURATION TIME UR: 24 HRS
CREAT UR-MCNC: 28.1 MG/DL
CREATINE 24H UR-MRATE: 0.55 G/24 HR (ref 0.7–1.6)
SPECIMEN VOL 24H UR: 1950 ML
SPECIMEN VOL 24H UR: 1950 ML

## 2021-06-30 ENCOUNTER — PRE-ADMISSION TESTING (OUTPATIENT)
Dept: PREADMISSION TESTING | Facility: HOSPITAL | Age: 76
End: 2021-06-30

## 2021-06-30 ENCOUNTER — APPOINTMENT (OUTPATIENT)
Dept: PREADMISSION TESTING | Facility: HOSPITAL | Age: 76
End: 2021-06-30

## 2021-06-30 VITALS
RESPIRATION RATE: 18 BRPM | SYSTOLIC BLOOD PRESSURE: 182 MMHG | DIASTOLIC BLOOD PRESSURE: 92 MMHG | OXYGEN SATURATION: 95 % | HEART RATE: 92 BPM

## 2021-06-30 RX ORDER — MULTIVITAMIN WITH IRON
TABLET ORAL DAILY
COMMUNITY

## 2021-06-30 RX ORDER — CLOPIDOGREL BISULFATE 75 MG/1
75 TABLET ORAL DAILY
COMMUNITY

## 2021-07-07 ENCOUNTER — LAB (OUTPATIENT)
Dept: LAB | Facility: HOSPITAL | Age: 76
End: 2021-07-07

## 2021-07-07 LAB
ALBUMIN SERPL-MCNC: 4.2 G/DL (ref 3.5–5.2)
ANION GAP SERPL CALCULATED.3IONS-SCNC: 10.4 MMOL/L (ref 5–15)
BUN SERPL-MCNC: 20 MG/DL (ref 8–23)
BUN/CREAT SERPL: 20.6 (ref 7–25)
CALCIUM SPEC-SCNC: 12.4 MG/DL (ref 8.6–10.5)
CHLORIDE SERPL-SCNC: 105 MMOL/L (ref 98–107)
CO2 SERPL-SCNC: 23.6 MMOL/L (ref 22–29)
CREAT SERPL-MCNC: 0.97 MG/DL (ref 0.57–1)
GFR SERPL CREATININE-BSD FRML MDRD: 56 ML/MIN/1.73
GLUCOSE SERPL-MCNC: 99 MG/DL (ref 65–99)
PHOSPHATE SERPL-MCNC: 2.8 MG/DL (ref 2.5–4.5)
POTASSIUM SERPL-SCNC: 4.1 MMOL/L (ref 3.5–5.2)
SODIUM SERPL-SCNC: 139 MMOL/L (ref 136–145)
T3FREE SERPL-MCNC: 2.57 PG/ML (ref 2–4.4)
T4 FREE SERPL-MCNC: 1.31 NG/DL (ref 0.93–1.7)
TSH SERPL DL<=0.05 MIU/L-ACNC: 2.41 UIU/ML (ref 0.27–4.2)

## 2021-07-07 PROCEDURE — 83970 ASSAY OF PARATHORMONE: CPT | Performed by: INTERNAL MEDICINE

## 2021-07-07 PROCEDURE — 84443 ASSAY THYROID STIM HORMONE: CPT | Performed by: INTERNAL MEDICINE

## 2021-07-07 PROCEDURE — 84439 ASSAY OF FREE THYROXINE: CPT | Performed by: INTERNAL MEDICINE

## 2021-07-07 PROCEDURE — 84481 FREE ASSAY (FT-3): CPT | Performed by: INTERNAL MEDICINE

## 2021-07-07 PROCEDURE — 82306 VITAMIN D 25 HYDROXY: CPT | Performed by: INTERNAL MEDICINE

## 2021-07-07 PROCEDURE — 36415 COLL VENOUS BLD VENIPUNCTURE: CPT | Performed by: INTERNAL MEDICINE

## 2021-07-07 PROCEDURE — 80069 RENAL FUNCTION PANEL: CPT | Performed by: INTERNAL MEDICINE

## 2021-07-07 PROCEDURE — 82310 ASSAY OF CALCIUM: CPT | Performed by: INTERNAL MEDICINE

## 2021-07-08 ENCOUNTER — TELEPHONE (OUTPATIENT)
Dept: ENDOCRINOLOGY | Facility: CLINIC | Age: 76
End: 2021-07-08

## 2021-07-08 LAB
25(OH)D3 SERPL-MCNC: 76.4 NG/ML (ref 30–100)
CALCIUM SPEC-SCNC: 12.4 MG/DL (ref 8.6–10.5)
PTH-INTACT SERPL-MCNC: 160 PG/ML (ref 15–65)

## 2021-07-08 NOTE — PROGRESS NOTES
CHIEF COMPLAINT:    Chief Complaint   Patient presents with   • Follow-up       HISTORY OF PRESENT ILLNESS:    Josi Roe is a 75 y.o. female who has been seen previously for primary hyperparathyroidism.  She returns today to discuss possible surgery for this.  She continues to recover well from her recent stroke.  She has no complaints today.  Her main concern is what the cost of the surgery will be given that she still has hospital bills from her recent stroke.    EXAM:  Vitals:    06/24/21 1052   BP: 130/82   Pulse: 74   Temp: 97.2 °F (36.2 °C)   SpO2: 96%         No acute distress    ASSESSMENT:    Primary hyperparathyroidism    PLAN:    She would like proceed with parathyroidectomy.  The risks and benefits of parathyroidectomy been discussed with the patient.  She has been scheduled for surgery.          This document has been electronically signed by Kevin Stahl MD on July 8, 2021 15:22 CDT

## 2021-07-12 ENCOUNTER — LAB (OUTPATIENT)
Dept: LAB | Facility: HOSPITAL | Age: 76
End: 2021-07-12

## 2021-07-12 DIAGNOSIS — Z01.818 PREOP TESTING: ICD-10-CM

## 2021-07-12 LAB — SARS-COV-2 N GENE RESP QL NAA+PROBE: NOT DETECTED

## 2021-07-12 PROCEDURE — 87635 SARS-COV-2 COVID-19 AMP PRB: CPT

## 2021-07-12 PROCEDURE — C9803 HOPD COVID-19 SPEC COLLECT: HCPCS

## 2021-07-14 ENCOUNTER — HOSPITAL ENCOUNTER (OUTPATIENT)
Facility: HOSPITAL | Age: 76
Setting detail: HOSPITAL OUTPATIENT SURGERY
Discharge: HOME | End: 2021-07-14
Attending: SURGERY | Admitting: SURGERY

## 2021-07-14 ENCOUNTER — ANESTHESIA (OUTPATIENT)
Dept: PERIOP | Facility: HOSPITAL | Age: 76
End: 2021-07-14

## 2021-07-14 ENCOUNTER — ANESTHESIA EVENT (OUTPATIENT)
Dept: PERIOP | Facility: HOSPITAL | Age: 76
End: 2021-07-14

## 2021-07-14 VITALS
DIASTOLIC BLOOD PRESSURE: 108 MMHG | BODY MASS INDEX: 29.84 KG/M2 | SYSTOLIC BLOOD PRESSURE: 185 MMHG | WEIGHT: 158.07 LBS | HEART RATE: 98 BPM | HEIGHT: 61 IN | RESPIRATION RATE: 16 BRPM | OXYGEN SATURATION: 95 % | TEMPERATURE: 96.8 F

## 2021-07-14 DIAGNOSIS — E21.0 PRIMARY HYPERPARATHYROIDISM (HCC): ICD-10-CM

## 2021-07-14 LAB
CALCIUM SPEC-SCNC: 11.8 MG/DL (ref 8.6–10.5)
PTH-INTACT SERPL-MCNC: 151.6 PG/ML (ref 15–65)
PTH-INTACT SERPL-MCNC: 47.6 PG/ML (ref 15–65)
PTH-INTACT SERPL-SCNC: 120.1 PG/ML (ref 15–65)
QT INTERVAL: 418 MS
QTC INTERVAL: 525 MS

## 2021-07-14 PROCEDURE — 25010000002 HYDROMORPHONE 1 MG/ML SOLUTION: Performed by: NURSE ANESTHETIST, CERTIFIED REGISTERED

## 2021-07-14 PROCEDURE — 93010 ELECTROCARDIOGRAM REPORT: CPT | Performed by: INTERNAL MEDICINE

## 2021-07-14 PROCEDURE — 83970 ASSAY OF PARATHORMONE: CPT | Performed by: SURGERY

## 2021-07-14 PROCEDURE — C1889 IMPLANT/INSERT DEVICE, NOC: HCPCS | Performed by: SURGERY

## 2021-07-14 PROCEDURE — 25010000002 FENTANYL CITRATE (PF) 50 MCG/ML SOLUTION: Performed by: NURSE ANESTHETIST, CERTIFIED REGISTERED

## 2021-07-14 PROCEDURE — 25010000002 NEOSTIGMINE 10 MG/10ML SOLUTION: Performed by: NURSE ANESTHETIST, CERTIFIED REGISTERED

## 2021-07-14 PROCEDURE — 25010000002 PROPOFOL 10 MG/ML EMULSION: Performed by: NURSE ANESTHETIST, CERTIFIED REGISTERED

## 2021-07-14 PROCEDURE — 82310 ASSAY OF CALCIUM: CPT | Performed by: SURGERY

## 2021-07-14 PROCEDURE — S0260 H&P FOR SURGERY: HCPCS | Performed by: SURGERY

## 2021-07-14 PROCEDURE — 88305 TISSUE EXAM BY PATHOLOGIST: CPT

## 2021-07-14 PROCEDURE — 25010000002 CEFAZOLIN PER 500 MG: Performed by: SURGERY

## 2021-07-14 PROCEDURE — 60500 EXPLORE PARATHYROID GLANDS: CPT | Performed by: SURGERY

## 2021-07-14 PROCEDURE — 25010000002 ATROPINE PER 0.01 MG: Performed by: ANESTHESIOLOGY

## 2021-07-14 PROCEDURE — 25010000002 PHENYLEPHRINE 10 MG/ML SOLUTION: Performed by: NURSE ANESTHETIST, CERTIFIED REGISTERED

## 2021-07-14 PROCEDURE — 93005 ELECTROCARDIOGRAM TRACING: CPT | Performed by: SURGERY

## 2021-07-14 PROCEDURE — 88331 PATH CONSLTJ SURG 1 BLK 1SPC: CPT

## 2021-07-14 PROCEDURE — 25010000002 MIDAZOLAM PER 1 MG: Performed by: NURSE ANESTHETIST, CERTIFIED REGISTERED

## 2021-07-14 DEVICE — DEV CONTRL TISS STRATAFIXSPIRALMNCRYL PLSPS2 REV4/0 30CM: Type: IMPLANTABLE DEVICE | Site: PARATHYROID | Status: FUNCTIONAL

## 2021-07-14 DEVICE — FLOSEAL HEMOSTATIC MATRIX, 5ML
Type: IMPLANTABLE DEVICE | Site: PARATHYROID | Status: FUNCTIONAL
Brand: FLOSEAL HEMOSTATIC MATRIX

## 2021-07-14 RX ORDER — BUPIVACAINE HCL/0.9 % NACL/PF 0.1 %
2 PLASTIC BAG, INJECTION (ML) EPIDURAL ONCE
Status: COMPLETED | OUTPATIENT
Start: 2021-07-14 | End: 2021-07-14

## 2021-07-14 RX ORDER — ONDANSETRON 2 MG/ML
4 INJECTION INTRAMUSCULAR; INTRAVENOUS ONCE AS NEEDED
Status: DISCONTINUED | OUTPATIENT
Start: 2021-07-14 | End: 2021-07-14 | Stop reason: HOSPADM

## 2021-07-14 RX ORDER — EPHEDRINE SULFATE 50 MG/ML
10 INJECTION INTRAVENOUS ONCE
Status: COMPLETED | OUTPATIENT
Start: 2021-07-14 | End: 2021-07-14

## 2021-07-14 RX ORDER — ROCURONIUM BROMIDE 10 MG/ML
INJECTION, SOLUTION INTRAVENOUS AS NEEDED
Status: DISCONTINUED | OUTPATIENT
Start: 2021-07-14 | End: 2021-07-14 | Stop reason: SURG

## 2021-07-14 RX ORDER — NEOSTIGMINE METHYLSULFATE 1 MG/ML
INJECTION, SOLUTION INTRAVENOUS AS NEEDED
Status: DISCONTINUED | OUTPATIENT
Start: 2021-07-14 | End: 2021-07-14 | Stop reason: SURG

## 2021-07-14 RX ORDER — MIDAZOLAM HYDROCHLORIDE 1 MG/ML
INJECTION INTRAMUSCULAR; INTRAVENOUS AS NEEDED
Status: DISCONTINUED | OUTPATIENT
Start: 2021-07-14 | End: 2021-07-14 | Stop reason: SURG

## 2021-07-14 RX ORDER — SODIUM CHLORIDE 9 MG/ML
100 INJECTION, SOLUTION INTRAVENOUS CONTINUOUS
Status: DISCONTINUED | OUTPATIENT
Start: 2021-07-14 | End: 2021-07-14 | Stop reason: HOSPADM

## 2021-07-14 RX ORDER — ATROPINE SULFATE 0.4 MG/ML
0.2 AMPUL (ML) INJECTION AS NEEDED
Status: DISCONTINUED | OUTPATIENT
Start: 2021-07-14 | End: 2021-07-14 | Stop reason: HOSPADM

## 2021-07-14 RX ORDER — HYDROCODONE BITARTRATE AND ACETAMINOPHEN 5; 325 MG/1; MG/1
1 TABLET ORAL EVERY 6 HOURS PRN
Qty: 20 TABLET | Refills: 0 | Status: SHIPPED | OUTPATIENT
Start: 2021-07-14 | End: 2021-08-26

## 2021-07-14 RX ORDER — EPHEDRINE SULFATE 50 MG/ML
INJECTION, SOLUTION INTRAVENOUS AS NEEDED
Status: DISCONTINUED | OUTPATIENT
Start: 2021-07-14 | End: 2021-07-14 | Stop reason: SURG

## 2021-07-14 RX ORDER — PHENYLEPHRINE HYDROCHLORIDE 10 MG/ML
INJECTION INTRAVENOUS AS NEEDED
Status: DISCONTINUED | OUTPATIENT
Start: 2021-07-14 | End: 2021-07-14 | Stop reason: SURG

## 2021-07-14 RX ORDER — LIDOCAINE HYDROCHLORIDE 20 MG/ML
INJECTION, SOLUTION INFILTRATION; PERINEURAL AS NEEDED
Status: DISCONTINUED | OUTPATIENT
Start: 2021-07-14 | End: 2021-07-14 | Stop reason: SURG

## 2021-07-14 RX ORDER — FENTANYL CITRATE 50 UG/ML
INJECTION, SOLUTION INTRAMUSCULAR; INTRAVENOUS AS NEEDED
Status: DISCONTINUED | OUTPATIENT
Start: 2021-07-14 | End: 2021-07-14 | Stop reason: SURG

## 2021-07-14 RX ORDER — PROPOFOL 10 MG/ML
VIAL (ML) INTRAVENOUS AS NEEDED
Status: DISCONTINUED | OUTPATIENT
Start: 2021-07-14 | End: 2021-07-14 | Stop reason: SURG

## 2021-07-14 RX ORDER — BUPIVACAINE HYDROCHLORIDE 5 MG/ML
INJECTION, SOLUTION EPIDURAL; INTRACAUDAL AS NEEDED
Status: DISCONTINUED | OUTPATIENT
Start: 2021-07-14 | End: 2021-07-14 | Stop reason: HOSPADM

## 2021-07-14 RX ADMIN — ROCURONIUM BROMIDE 40 MG: 50 INJECTION INTRAVENOUS at 10:49

## 2021-07-14 RX ADMIN — HYDROMORPHONE HYDROCHLORIDE 0.25 MG: 1 INJECTION, SOLUTION INTRAMUSCULAR; INTRAVENOUS; SUBCUTANEOUS at 14:02

## 2021-07-14 RX ADMIN — ATROPINE SULFATE 0.8 MG: 0.4 INJECTION, SOLUTION INTRAMUSCULAR; INTRAVENOUS; SUBCUTANEOUS at 13:50

## 2021-07-14 RX ADMIN — GLYCOPYRROLATE 0.2 MG: 0.2 INJECTION, SOLUTION INTRAMUSCULAR; INTRAVITREAL at 12:15

## 2021-07-14 RX ADMIN — FENTANYL CITRATE 50 MCG: 50 INJECTION INTRAMUSCULAR; INTRAVENOUS at 10:54

## 2021-07-14 RX ADMIN — Medication 2 G: at 10:55

## 2021-07-14 RX ADMIN — FENTANYL CITRATE 50 MCG: 50 INJECTION INTRAMUSCULAR; INTRAVENOUS at 10:45

## 2021-07-14 RX ADMIN — EPHEDRINE SULFATE 10 MG: 50 INJECTION INTRAVENOUS at 13:05

## 2021-07-14 RX ADMIN — EPHEDRINE SULFATE 5 MG: 50 INJECTION INTRAVENOUS at 11:08

## 2021-07-14 RX ADMIN — HYDROMORPHONE HYDROCHLORIDE 0.25 MG: 1 INJECTION, SOLUTION INTRAMUSCULAR; INTRAVENOUS; SUBCUTANEOUS at 14:22

## 2021-07-14 RX ADMIN — LIDOCAINE HYDROCHLORIDE 40 MG: 20 INJECTION, SOLUTION INFILTRATION; PERINEURAL at 10:49

## 2021-07-14 RX ADMIN — ATROPINE SULFATE 0.2 MG: 0.4 INJECTION, SOLUTION INTRAMUSCULAR; INTRAVENOUS; SUBCUTANEOUS at 13:03

## 2021-07-14 RX ADMIN — PROPOFOL 20 MG: 10 INJECTION, EMULSION INTRAVENOUS at 11:21

## 2021-07-14 RX ADMIN — PROPOFOL 50 MG: 10 INJECTION, EMULSION INTRAVENOUS at 11:12

## 2021-07-14 RX ADMIN — FENTANYL CITRATE 50 MCG: 50 INJECTION INTRAMUSCULAR; INTRAVENOUS at 11:12

## 2021-07-14 RX ADMIN — MIDAZOLAM HYDROCHLORIDE 1 MG: 2 INJECTION, SOLUTION INTRAMUSCULAR; INTRAVENOUS at 10:43

## 2021-07-14 RX ADMIN — PROPOFOL 110 MG: 10 INJECTION, EMULSION INTRAVENOUS at 10:49

## 2021-07-14 RX ADMIN — FENTANYL CITRATE 50 MCG: 50 INJECTION INTRAMUSCULAR; INTRAVENOUS at 11:21

## 2021-07-14 RX ADMIN — NEOSTIGMINE METHYLSULFATE 1 MG: 1 INJECTION, SOLUTION INTRAVENOUS at 12:15

## 2021-07-14 RX ADMIN — PHENYLEPHRINE HYDROCHLORIDE 100 MCG: 10 INJECTION INTRAVENOUS at 11:07

## 2021-07-14 RX ADMIN — MIDAZOLAM HYDROCHLORIDE 1 MG: 2 INJECTION, SOLUTION INTRAMUSCULAR; INTRAVENOUS at 10:37

## 2021-07-14 RX ADMIN — SODIUM CHLORIDE 100 ML/HR: 9 INJECTION, SOLUTION INTRAVENOUS at 08:33

## 2021-07-14 NOTE — BRIEF OP NOTE
PARATHYROIDECTOMY  Progress Note    Josi Roe  7/14/2021    Pre-op Diagnosis:   Primary hyperparathyroidism (CMS/[facility]) [E21.0]       Post-Op Diagnosis Codes:     * Primary hyperparathyroidism (CMS/[facility]) [E21.0]    Procedure/CPT® Codes:        Procedure(s):  PARATHYROIDECTOMY, left inferior    Surgeon(s):  Kevin Stahl MD    Anesthesia: General    Staff:   Circulator: Maddie Smith RN; Meliza Wilson RN  Scrub Person: Celso Obregon  Assistant: Sarah Rosado CSA  Assistant: Sarah Rosado CSA      Estimated Blood Loss: minimal    Urine Voided: * No values recorded between 7/14/2021 10:40 AM and 7/14/2021 12:37 PM *    Specimens:                Specimens     ID Source Type Tests Collected By Collected At Frozen?    1 Blood, Venous Line Blood · PTH INTRAOPERATIVE   Kevin Stahl MD 7/14/21 1207     This specimen was not marked as sent.    A Parathyroid Gland Tissue · TISSUE PATHOLOGY EXAM   Kevin Stahl MD 7/14/21 1153 Yes    Description: LEFT INFERIOR PARATHYROID **MD TO LOOK AT; FROZEN**    This specimen was not marked as sent.                Drains:   [REMOVED] External Urinary Catheter (Removed)       Findings: 4 gram left inferior parathyroid gland    Complications: none    Assistant: Sarah Rosado CSA  was responsible for performing the following activities: Retraction, Suction and Irrigation and their skilled assistance was necessary for the success of this case.          This document has been electronically signed by Kevin Stahl MD on July 14, 2021 12:51 CDT      Kevin Stahl MD     Date: 7/14/2021  Time: 12:50 CDT

## 2021-07-14 NOTE — ANESTHESIA PROCEDURE NOTES
Airway  Urgency: elective    Date/Time: 7/14/2021 10:53 AM  End Time:7/14/2021 10:53 AM    General Information and Staff    Patient location during procedure: OR  CRNA: Svitlana Fonseca CRNA    Indications and Patient Condition  Indications for airway management: airway protection    Preoxygenated: yes  Mask difficulty assessment: 1 - vent by mask    Final Airway Details  Final airway type: endotracheal airway      Successful airway: ETT  Cuffed: yes   Successful intubation technique: video laryngoscopy  Facilitating devices/methods: intubating stylet  Endotracheal tube insertion site: oral  Blade: Rory  Blade size: 3  ETT size (mm): 7.0  Cormack-Lehane Classification: grade I - full view of glottis  Placement verified by: chest auscultation   Cuff volume (mL): 5  Measured from: lips  ETT/EBT  to lips (cm): 20  Number of attempts at approach: 1  Assessment: lips, teeth, and gum same as pre-op and atraumatic intubation

## 2021-07-14 NOTE — H&P
CHIEF COMPLAINT:    Primary hyperparathyroidism    HISTORY OF PRESENT ILLNESS:    Josi Roe is a 75 y.o. female who is here for parathyroidectomy for primary hyperparathyroidism.  She has no new complaints.    Past Medical History:   Diagnosis Date   • Arthritis    • Heart murmur    • Hyperlipidemia    • Hypertension    • PONV (postoperative nausea and vomiting)    • Stroke (CMS/HCC)        Past Surgical History:   Procedure Laterality Date   • CHOLECYSTECTOMY     • CLUB FOOT RELEASE Bilateral    • HYSTERECTOMY     • LAPAROSCOPIC TUBAL LIGATION     • ORIF WRIST FRACTURE Left        Prior to Admission medications    Medication Sig Start Date End Date Taking? Authorizing Provider   amLODIPine (NORVASC) 10 MG tablet Take 10 mg by mouth Every Night. 6/20/21  Yes Peace Connor MD   atorvastatin (LIPITOR) 80 MG tablet Take 80 mg by mouth Every Night. 6/20/21  Yes Peace Connor MD   losartan (COZAAR) 50 MG tablet Take 50 mg by mouth Every Night. 2 tabs   Yes Peace Connor MD   Magnesium 250 MG tablet Take  by mouth Daily.   Yes Peace Connor MD   Probiotic Product (PROBIOTIC DAILY PO) Take  by mouth Daily.   Yes Peace Connor MD   VITAMIN D, CHOLECALCIFEROL, PO Take 125 mcg by mouth Daily. 2 caps   Yes Peace Connor MD   aspirin 81 MG EC tablet Take 81 mg by mouth Daily.    Peace Connor MD   CALCIUM CITRATE PO Take  by mouth Daily.    Peace Connor MD   clopidogrel (PLAVIX) 75 MG tablet Take 75 mg by mouth Daily.    Peace Connor MD       Allergies   Allergen Reactions   • Novocain [Procaine] Other (See Comments)     Passes Out       Family History   Problem Relation Age of Onset   • Thyroid disease Mother    • Cancer Father    • Thyroid disease Sister    • Cancer Paternal Aunt    • Cancer Paternal Uncle    • Heart disease Paternal Uncle    • Diabetes Maternal Grandmother    • Diabetes Maternal Grandfather        Social History  "    Socioeconomic History   • Marital status:      Spouse name: Not on file   • Number of children: Not on file   • Years of education: Not on file   • Highest education level: Not on file   Tobacco Use   • Smoking status: Never Smoker   • Smokeless tobacco: Never Used   Substance and Sexual Activity   • Alcohol use: Yes     Comment: socially   • Drug use: Never   • Sexual activity: Defer       Review of Systems   Hematological: Does not bruise/bleed easily.       Objective     BP (!) 202/99 (BP Location: Right arm, Patient Position: Lying)   Pulse 64   Temp 96.9 °F (36.1 °C) (Temporal)   Resp 18   Ht 154.9 cm (61\")   Wt 71.7 kg (158 lb 1.1 oz)   SpO2 96%   BMI 29.87 kg/m²     Physical Exam  Constitutional:       General: She is not in acute distress.     Appearance: Normal appearance. She is not ill-appearing, toxic-appearing or diaphoretic.   HENT:      Head: Normocephalic and atraumatic.   Eyes:      General: No scleral icterus.        Right eye: No discharge.         Left eye: No discharge.      Extraocular Movements: Extraocular movements intact.      Conjunctiva/sclera: Conjunctivae normal.   Pulmonary:      Effort: Pulmonary effort is normal. No respiratory distress.   Skin:     General: Skin is warm.   Neurological:      General: No focal deficit present.      Mental Status: She is alert and oriented to person, place, and time.   Psychiatric:         Mood and Affect: Mood normal.         Behavior: Behavior normal.         Thought Content: Thought content normal.         Judgment: Judgment normal.         DIAGNOSTIC DATA:    Left lower lobe     ASSESSMENT:    Primary hyperparathyroidism    PLAN:    Will plan for parathyroidectomy today.  The risks and benefits have been discussed.  We discussed that she also has thyroid nodules which will need work up.          This document has been electronically signed by Kevin Stahl MD on July 14, 2021 10:28 CDT      "

## 2021-07-14 NOTE — ANESTHESIA PREPROCEDURE EVALUATION
Anesthesia Evaluation     Patient summary reviewed and Nursing notes reviewed   history of anesthetic complications: PONV  NPO Solid Status: > 8 hours  NPO Liquid Status: > 8 hours           Airway   Mallampati: II  TM distance: >3 FB  Neck ROM: full  Possible difficult intubation  Dental    (+) poor dentition        Pulmonary     breath sounds clear to auscultation  (-) asthma, sleep apnea, rhonchi, decreased breath sounds, wheezes, not a smoker  Cardiovascular   Exercise tolerance: poor (<4 METS)    ECG reviewed  PT is on anticoagulation therapy  Rhythm: regular  Rate: normal    (+) hypertension poorly controlled 2 medications or greater, CHF Diastolic >=55%, hyperlipidemia,   (-) pacemaker, past MI, dysrhythmias, murmur, cardiac stents, DVT    ROS comment: TTE 3/8/2021:  Adult Transthoracic Echo Complete W/ Cont if Necessary Per Protocol   · Left ventricular wall thickness is consistent with mild to moderate   concentric hypertrophy.   · Estimated left ventricular EF = 61% Left ventricular ejection fraction   appears to be 61 - 65%. Left ventricular systolic function is normal.   · Left ventricular diastolic function is consistent with (grade I)   impaired relaxation.   · Left atrial volume is mildly increased.   · Saline test results are negative.     EKG 3/9/2021:  Marked sinus bradycardia  Nonspecific ST abnormality  Abnormal ECG    Neuro/Psych  (+) CVA residual symptoms,     (-) seizures  GI/Hepatic/Renal/Endo    (-)  obesity, GERD    Musculoskeletal     Abdominal  - normal exam   Substance History      OB/GYN negative ob/gyn ROS         Other   arthritis,      ROS/Med Hx Other: Primary hyperparathyroidism:  Ca=12.4, PTH=160, 24hr urine YO=737    Hx of CVA 3/2021- Sequ[place]ae pt has wobbly walk sometimes per pt. Also has forgetfulness. Denies any other symptoms. Was placed on plavix at that time. Plavix last taken 7/9/2021    Hx of PONV: Last time pt got PONV was 1993. Due to age of 75 would give decadron &  zofran instead of scopolamine patch too                  Anesthesia Plan    ASA 3     general   (Pt has left AC 20Ga IV)  intravenous induction     Anesthetic plan, all risks, benefits, and alternatives have been provided, discussed and informed consent has been obtained with: patient.    Plan discussed with CRNA.

## 2021-07-14 NOTE — PERIOPERATIVE NURSING NOTE
Anesthesia at bedside. Crash cart at bedside in case needed.  Pt responds to verbal stimuli but is still very weak and lethargic.  HR remains in the 20s, junctional rhythm.  Atropine being given as well as ephedrine. BP 61/40.  Dr Stahl at bedside as well.

## 2021-07-14 NOTE — PERIOPERATIVE NURSING NOTE
Pt's HR dropped into 20's with junctional rhythm noted. Pt somewhat symptomatic - responds to verbal stimuli weakly but otherwise unresponsive.  Says she feels very weak.  O2 sat 98% on 8l/m simple mask.  Bp 60s.  Pulse palpable but weak & thready.  Pale in color. Anesthesia notified.

## 2021-07-14 NOTE — ANESTHESIA POSTPROCEDURE EVALUATION
Patient: Josi Roe    Procedure Summary     Date: 07/14/21 Room / Location: [facility] OR 03 / [facility] OR    Anesthesia Start: 1041 Anesthesia Stop: 1241    Procedure: PARATHYROIDECTOMY (N/A Neck) Diagnosis:       Primary hyperparathyroidism (CMS/HCC)      (Primary hyperparathyroidism (CMS/HCC) [E21.0])    Surgeons: Kevin Stahl MD Provider: Anitha Murray DO    Anesthesia Type: general ASA Status: 3          Anesthesia Type: general    Vitals  Vitals Value Taken Time   /82 07/14/21 1238   Temp 97.1 °F (36.2 °C) 07/14/21 1238   Pulse 81 07/14/21 1238   Resp 20 07/14/21 1238   SpO2 94 % 07/14/21 1238           Post Anesthesia Care and Evaluation    Patient location during evaluation: PACU  Patient participation: waiting for patient participation  Pain score: 0  Pain management: adequate  Airway patency: patent  Anesthetic complications: No anesthetic complications  PONV Status: none  Cardiovascular status: hemodynamically stable  Respiratory status: oral airway, spontaneous ventilation and face mask

## 2021-07-14 NOTE — PERIOPERATIVE NURSING NOTE
Pt more alert. Voices no co's. Bp 185//93.  .  Pulse normal.   Monitor showing NSR. Color less pale.  Dr Patel speaking with Dr Leyva via telephone. EKG to be completed.

## 2021-07-15 NOTE — OP NOTE
Operative Note    Josi Roe  7/14/2021    Pre-op Diagnosis:   Primary hyperparathyroidism (CMS/HCC) [E21.0]    Post-op Diagnosis:     Post-Op Diagnosis Codes:     * Primary hyperparathyroidism (CMS/HCC) [E21.0]    Procedure/CPT® Codes:      Procedure(s):  PARATHYROIDECTOMY, left inferior    Surgeon(s):  Kevin Stahl MD    Anesthesia: General    Staff:   Circulator: Maddie Smith RN; Meliza Wilson RN  Scrub Person: Celso Obregon  Assistant: Sarah Rosado CSA    Estimated Blood Loss: minimal    Specimens:                ID Type Source Tests Collected by Time   1 (Not marked as sent) :  Blood Blood, Venous Line PTH INTRAOPERATIVE Kevin Stahl MD 7/14/2021 1207   A (Not marked as sent) : LEFT INFERIOR PARATHYROID **MD TO LOOK AT; FROZEN** Tissue Parathyroid Gland TISSUE PATHOLOGY EXAM Kevin Stahl MD 7/14/2021 1153         Drains:   [REMOVED] External Urinary Catheter (Removed)       Findings: Large left inferior parathyroid, 4 g on frozen section    Complications: None    Indication: Primary hyperparathyroidism    Operative Note:    The patient was seen and consented preoperatively.  Following this she is brought to the operating room placed in supine position on the OR table.  General anesthetic was administered and the patient was orotracheally intubated without incident.  A shoulder roll was then placed to allow for moderate extension of the neck and her arms were tucked at her sides.  A preoperative briefing was then performed.  The neck and upper chest were prepped and draped in normal sterile fashion.  A timeout was then performed.    Following timeout a low transverse neck incision was marked on the skin approximately 2 fingerbreadths above the clavicles.  Local anesthetic was then injected in the skin incision made.  Dissection was then carried out through the platysma muscles using electrocautery.  The platysma muscles and the overlying skin were then  elevated up and away from the strap muscles circumferentially around the wound extending superiorly up to the thyroid cartilage notch and inferiorly down to the sternal notch.  An extra small wound protector was then placed to maintain retraction of the skin edges.  The strap muscles were then grasped and elevated upward in the midline of the neck was then opened using electrocautery.    The left side of the neck was then approached first given the patient's preoperative imaging.  The sternohyoid muscle was elevated up and away from the sternothyroid muscle.  The sternothyroid muscle was then dissected up and away from the left thyroid lobe.  Near the inferior aspect of the left thyroid lobe and normal appearing parathyroid gland was seen attached to the thyroid tissue.  Just below this and the fatty tissue adjacent to the thyroid a soft tissue mass was identified.  Blunt dissection was then undertaken in this area taking care to avoid injury to the visualized left recurrent laryngeal nerve.  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  It was bluntly dissected away from the surrounding tissues and its vascular pedicle was then divided with the harmonic dissector.  It was then passed off the field for frozen section which revealed hypercellular parathyroid tissue weighing 4 g.    Approximately 10 minutes following removal of the parathyroid blood was drawn for parathyroid analysis from the internal jugular vein.  Pressure was held over the area and there was no bleeding noted.    The patient had a known left thyroid nodule which was palpable but not visible.  Therefore, no further attempts at biopsy or excision of the thyroid nodule were pursued.  5 cc of FloSeal was placed into the operative bed which appeared to be hemostatic.    The strap muscles were then reapproximated at the midline using interrupted 2-0 Vicryl suture.  The platysma muscle was then reapproximated using running  3-0 Vicryl suture.  The skin was then reapproximated using running 4-0 Monocryl.  Mastisol and Steri-Strips were then applied.  The patient was then awakened and returned to recovery.    Assistant: Sarah Rosado CSA was responsible for performing the following activities: Retraction, Suction and Irrigation and their skilled assistance was necessary for the success of this case.            This document has been electronically signed by Kevin Stahl MD on July 15, 2021 15:57 CDT    s  Kevin Stahl MD     Date: 7/15/2021  Time: 15:52 CDT

## 2021-07-16 ENCOUNTER — OFFICE VISIT (OUTPATIENT)
Dept: CARDIOLOGY | Facility: CLINIC | Age: 76
End: 2021-07-16

## 2021-07-16 VITALS
WEIGHT: 158 LBS | HEIGHT: 61 IN | SYSTOLIC BLOOD PRESSURE: 120 MMHG | OXYGEN SATURATION: 98 % | RESPIRATION RATE: 18 BRPM | DIASTOLIC BLOOD PRESSURE: 86 MMHG | BODY MASS INDEX: 29.83 KG/M2 | HEART RATE: 62 BPM

## 2021-07-16 DIAGNOSIS — I10 ESSENTIAL HYPERTENSION: ICD-10-CM

## 2021-07-16 DIAGNOSIS — E78.2 MIXED HYPERLIPIDEMIA: ICD-10-CM

## 2021-07-16 DIAGNOSIS — R00.1 BRADYCARDIA, SINUS: ICD-10-CM

## 2021-07-16 DIAGNOSIS — I63.39 CEREBROVASCULAR ACCIDENT (CVA) DUE TO THROMBOSIS OF OTHER CEREBRAL ARTERY (HCC): Primary | ICD-10-CM

## 2021-07-16 LAB
LAB AP CASE REPORT: NORMAL
PATH REPORT.FINAL DX SPEC: NORMAL

## 2021-07-16 PROCEDURE — 93000 ELECTROCARDIOGRAM COMPLETE: CPT | Performed by: INTERNAL MEDICINE

## 2021-07-16 PROCEDURE — 99214 OFFICE O/P EST MOD 30 MIN: CPT | Performed by: INTERNAL MEDICINE

## 2021-07-16 NOTE — PROGRESS NOTES
Josi Roe  75 y.o. female    07/16/2021  1. Cerebrovascular accident (CVA) due to thrombosis of other cerebral artery (CMS/HCC)    2. Mixed hyperlipidemia    3. Essential hypertension    4. Bradycardia, sinus        History of Present Illness:    Body mass index is 29.85 kg/m². BMI is above normal parameters. Recommendations include: exercise counseling, nutrition counseling and referral to primary care.    75 years old patient with a background history of CVA, hypertension, hyperlipidemia previously evaluated with Dr. Poole with echo reported normal left and systolic function mild to moderate concentric left ventricular hypertrophy with mildly increased left atrial volume volume and no evidence of PFO, history of sinus bradycardia and in April underwent monitor study reported to have good average heart rate 80 bpm minimum 40 beats short.  Time and maximum 130 bpm.  Normal burden of premature atrial ventricular complex.  The patient during surgery while under anesthesia have sinus bradycardia  responded to atropine.  Subsequent discharge.  EKG in the office today sinus at 60 bpm without acute ST-T wave changes.  The patient is not on AV shar blocking drug.  Post procedure ECG normal sinus rhythm at rate of 70 bpm.  Monitor 4/30/2020    · A relatively benign monitor study.    Study Impression A relatively benign monitor study. Patient had a minimum heart rate of 42 beats a minute, maximum heart of 133 beats a minute and average heart rate of 80 bpm.  Predominant underlying rhythm was sinus rhythm.  PACs were rare less than 1%.  PVCs were rare less than 1%.  No triggered or diary events to correlate with the PACs or PVCs           Echo 3/8/2021      · Left ventricular wall thickness is consistent with mild to moderate concentric hypertrophy.  · Estimated left ventricular EF = 61% Left ventricular ejection fraction appears to be 61 - 65%. Left ventricular systolic function is normal.  · Left ventricular  diastolic function is consistent with (grade I) impaired relaxation.  · Left atrial volume is mildly increased.  · Saline test results are negative.      MRI 3/7/21:  Acute 1.5 cm infarct left side of the renny.  Old 1.9 cm infarct anterior aspect of the left basal ganglia.  Old thalamic and basal ganglia lacunar infarcts.  Minimal small vessel disease.  Cerebral atrophy.    SUBJECTIVE:    Allergies   Allergen Reactions   • Novocain [Procaine] Other (See Comments)     Passes Out         Past Medical History:   Diagnosis Date   • Arthritis    • Heart murmur    • Hyperlipidemia    • Hypertension    • PONV (postoperative nausea and vomiting)    • Stroke (CMS/HCC)          Past Surgical History:   Procedure Laterality Date   • CHOLECYSTECTOMY     • CLUB FOOT RELEASE Bilateral    • HYSTERECTOMY     • LAPAROSCOPIC TUBAL LIGATION     • ORIF WRIST FRACTURE Left          Family History   Problem Relation Age of Onset   • Thyroid disease Mother    • Cancer Father    • Thyroid disease Sister    • Cancer Paternal Aunt    • Cancer Paternal Uncle    • Heart disease Paternal Uncle    • Diabetes Maternal Grandmother    • Diabetes Maternal Grandfather          Social History     Socioeconomic History   • Marital status:      Spouse name: Not on file   • Number of children: Not on file   • Years of education: Not on file   • Highest education level: Not on file   Tobacco Use   • Smoking status: Never Smoker   • Smokeless tobacco: Never Used   Substance and Sexual Activity   • Alcohol use: Yes     Comment: socially   • Drug use: Never   • Sexual activity: Defer         Current Outpatient Medications   Medication Sig Dispense Refill   • amLODIPine (NORVASC) 10 MG tablet Take 10 mg by mouth Every Night.     • aspirin 81 MG EC tablet Take 81 mg by mouth Daily.     • atorvastatin (LIPITOR) 80 MG tablet Take 80 mg by mouth Every Night.     • CALCIUM CITRATE PO Take  by mouth Daily.     • clopidogrel (PLAVIX) 75 MG tablet Take 75 mg  "by mouth Daily.     • HYDROcodone-acetaminophen (NORCO) 5-325 MG per tablet Take 1 tablet by mouth Every 6 (Six) Hours As Needed for Moderate or Severe Pain. 20 tablet 0   • losartan (COZAAR) 50 MG tablet Take 50 mg by mouth Every Night. 2 tabs     • Magnesium 250 MG tablet Take  by mouth Daily.     • Probiotic Product (PROBIOTIC DAILY PO) Take  by mouth Daily.     • VITAMIN D, CHOLECALCIFEROL, PO Take 125 mcg by mouth Daily. 2 caps       No current facility-administered medications for this visit.           Review of Systems:     Constitutional:  Denies recent weight loss, weight gain,no change in exercise tolerance.     HENT:  Denies any hearing loss, epistaxis    Eyes: No blurring    Respiratory: No fever or chills    Cardiovascular: See H&P    Gastrointestinal:  Denies change in bowel habits and dyspepsia    Endocrine: Negative for cold intolerance, heat intolerance, polydipsia    Genitourinary: Negative.      Musculoskeletal: History of osteoarthritis    Skin:  Deniesrashes, or skin lesions.     Allergic/Immunologic: Negative.  Negative for environmental allergies    Neurological: History of CVA    Hematological: Denies any food allergies, seasonal allergies    Psychiatric/Behavioral: Denies any history of depression        OBJECTIVE:    /86 (BP Location: Left arm, Patient Position: Sitting, Cuff Size: Adult)   Pulse 62   Resp 18   Ht 154.9 cm (61\")   Wt 71.7 kg (158 lb)   SpO2 98%   BMI 29.85 kg/m²     Physical Exam:     Constitutional: Cooperative, alert and oriented, well-developed, well-nourished, in no acute distress.     HENT:   Head: Normocephalic, conjunctive is a pink, thyroid is nonpalpable no carotid bruit and trachea central.     Cardiovascular: Regular rhythm, S1 and S2 normal, no S3 or S4. Apical impulse not displaced. No murmurs    Pulmonary/Chest: Chest: No chest wall tenderness no rales and wheezing    Abdominal: Abdomen soft, bowel sounds normoactive, no masses,    Musculoskeletal: " No deformities, clubbing, cyanosis, erythema. positive mild edema  Neurological: No gross motor or sensory deficits noted    Skin: Warm and dry to the touch, no apparent skin lesions .     Psychiatric: He has a normal mood and affect. His behavior is normal        Procedures      Lab Results   Component Value Date    WBC 9.24 03/08/2021    HGB 14.3 03/08/2021    HCT 43.0 03/08/2021    MCV 89.0 03/08/2021     03/08/2021     Lab Results   Component Value Date    GLUCOSE 99 07/07/2021    BUN 20 07/07/2021    CREATININE 0.97 07/07/2021    EGFRIFNONA 56 (L) 07/07/2021    BCR 20.6 07/07/2021    CO2 23.6 07/07/2021    CALCIUM 11.8 (H) 07/14/2021    ALBUMIN 4.20 07/07/2021    AST 14 03/07/2021    ALT 20 03/07/2021     Lab Results   Component Value Date    CHOL 162 03/08/2021    CHOL 189 08/07/2020     Lab Results   Component Value Date    TRIG 145 03/08/2021    TRIG 166 (H) 08/07/2020     Lab Results   Component Value Date    HDL 56 03/08/2021    HDL 40 08/07/2020     No components found for: LDLCALC  Lab Results   Component Value Date    LDL 81 03/08/2021     (H) 08/07/2020     No results found for: HDLLDLRATIO  No components found for: CHOLHDL  Lab Results   Component Value Date    HGBA1C 6.20 (H) 03/08/2021     Lab Results   Component Value Date    TSH 2.410 07/07/2021           ASSESSMENT AND PLAN:  Sinus bradycardia asymptomatic    75 years old patient underwent parathyroidectomy and during procedure patient noted to have sinus bradycardia responded well to atropine.  Previous Holter which was done in April without significant bradyarrhythmia such as persistent heart rate less than 40 bpm has good average heart rate and normal burden of premature atrial and ventricular complex.  EKG in the office is sinus rhythm at 60 bpm.  There is no definitive indication of pacemaker implantation given the previous monitor today's EKG and asymptomatic conditions.      #2 hypertension with hypertensive heart  disease    Good blood pressures 120/80 will continue amlodipine 10 mg and losartan 50 mg.      3 hyperlipidemia patient is on statin monitored by family doctor      4 history of CVA continue antiplatelet    Diagnoses and all orders for this visit:    1. Cerebrovascular accident (CVA) due to thrombosis of other cerebral artery (CMS/HCC) (Primary)  -     ECG 12 Lead    2. Mixed hyperlipidemia    3. Essential hypertension    4. Bradycardia, sinus          Avelino Leyva MD  7/16/2021  11:00 CDT

## 2021-07-27 ENCOUNTER — OFFICE VISIT (OUTPATIENT)
Dept: SURGERY | Facility: CLINIC | Age: 76
End: 2021-07-27

## 2021-07-27 VITALS
DIASTOLIC BLOOD PRESSURE: 90 MMHG | HEART RATE: 76 BPM | HEIGHT: 61 IN | SYSTOLIC BLOOD PRESSURE: 190 MMHG | BODY MASS INDEX: 29.68 KG/M2 | WEIGHT: 157.2 LBS | OXYGEN SATURATION: 97 % | TEMPERATURE: 97.2 F

## 2021-07-27 DIAGNOSIS — Z09 FOLLOW UP: Primary | ICD-10-CM

## 2021-07-27 PROCEDURE — 99024 POSTOP FOLLOW-UP VISIT: CPT | Performed by: SURGERY

## 2021-07-27 NOTE — PROGRESS NOTES
CHIEF COMPLAINT:    Chief Complaint   Patient presents with   • Post-op       HISTORY OF PRESENT ILLNESS:    Josi Roe is a 75 y.o. female who underwent left inferior parathyroidectomy for a hyperfunctioning adenoma that weighed 4 g.  She returns today for follow-up.  Her pathology was discussed with her.  Overall she is doing well.  She does still have some fatigue and residual effects from her prior CVA.  She has no pain or swelling in the neck.    EXAM:  Vitals:    07/27/21 1122   BP: (!) 190/90   Pulse: 76   Temp: 97.2 °F (36.2 °C)   SpO2: 97%         Healing low transverse neck incision    ASSESSMENT:    Status post parathyroidectomy    PLAN:    Overall doing well.  We will plan to see her back in about 2 weeks for follow-up.          This document has been electronically signed by Kevin Stahl MD on July 27, 2021 11:36 CDT

## 2021-07-30 LAB
QT INTERVAL: 424 MS
QTC INTERVAL: 419 MS

## 2021-08-10 ENCOUNTER — LAB (OUTPATIENT)
Dept: LAB | Facility: HOSPITAL | Age: 76
End: 2021-08-10

## 2021-08-10 ENCOUNTER — OFFICE VISIT (OUTPATIENT)
Dept: SURGERY | Facility: CLINIC | Age: 76
End: 2021-08-10

## 2021-08-10 VITALS
WEIGHT: 153 LBS | DIASTOLIC BLOOD PRESSURE: 86 MMHG | SYSTOLIC BLOOD PRESSURE: 172 MMHG | HEIGHT: 61 IN | HEART RATE: 69 BPM | TEMPERATURE: 97.1 F | BODY MASS INDEX: 28.89 KG/M2

## 2021-08-10 DIAGNOSIS — Z09 FOLLOW UP: Primary | ICD-10-CM

## 2021-08-10 DIAGNOSIS — Z86.39 HISTORY OF PRIMARY HYPERPARATHYROIDISM: ICD-10-CM

## 2021-08-10 DIAGNOSIS — E04.1 THYROID NODULE: ICD-10-CM

## 2021-08-10 LAB
CALCIUM SPEC-SCNC: 9.9 MG/DL (ref 8.6–10.5)
PTH-INTACT SERPL-MCNC: 48.6 PG/ML (ref 15–65)

## 2021-08-10 PROCEDURE — 99024 POSTOP FOLLOW-UP VISIT: CPT | Performed by: SURGERY

## 2021-08-10 PROCEDURE — 82310 ASSAY OF CALCIUM: CPT | Performed by: SURGERY

## 2021-08-10 PROCEDURE — 83970 ASSAY OF PARATHORMONE: CPT | Performed by: SURGERY

## 2021-08-10 PROCEDURE — 36415 COLL VENOUS BLD VENIPUNCTURE: CPT | Performed by: SURGERY

## 2021-08-10 NOTE — PROGRESS NOTES
CHIEF COMPLAINT:    Chief Complaint   Patient presents with   • Post-op     parathyroidectomy       HISTORY OF PRESENT ILLNESS:    Josi Roe is a 75 y.o. female who underwent parathyroidectomy for primary hyperparathyroidism.  She returns today for additional follow-up.  She does note intermittent dizziness which apparently has been ongoing since March.  She has been previously evaluated by her PCP as well as by Dr. Oliver for this.    EXAM:  Vitals:    08/10/21 1009   BP: 172/86   Pulse: 69   Temp: 97.1 °F (36.2 °C)         Lower transverse neck incision healing appropriately    ASSESSMENT:    Status post parathyroidectomy    PLAN:    Recheck calcium and PTH.  Repeat thyroid ultrasound in 2 months to reassess the nodules of the thyroid.  Due to the fact that I will be changing practices the patient will follow-up with my partners at that time.    We also discussed reestablishing a primary care physician as her physician apparently has recently left.  She will call their practice at [facility] to establish new primary care provider.          This document has been electronically signed by Kevin Stahl MD on August 10, 2021 11:08 CDT

## 2021-08-10 NOTE — PATIENT INSTRUCTIONS
"BMI for Adults  What is BMI?  Body mass index (BMI) is a number that is calculated from a person's weight and height. BMI can help estimate how much of a person's weight is composed of fat. BMI does not measure body fat directly. Rather, it is an alternative to procedures that directly measure body fat, which can be difficult and expensive.  BMI can help identify people who may be at higher risk for certain medical problems.  What are BMI measurements used for?  BMI is used as a screening tool to identify possible weight problems. It helps determine whether a person is obese, overweight, a healthy weight, or underweight.  BMI is useful for:  · Identifying a weight problem that may be related to a medical condition or may increase the risk for medical problems.  · Promoting changes, such as changes in diet and exercise, to help reach a healthy weight. BMI screening can be repeated to see if these changes are working.  How is BMI calculated?  BMI involves measuring your weight in relation to your height. Both height and weight are measured, and the BMI is calculated from those numbers. This can be done either in English ([place] or metric measurements. Note that charts and online BMI calculators are available to help you find your BMI quickly and easily without having to do these calculations yourself.  To calculate your BMI in English ([place] measurements:    1. Measure your weight in pounds (lb).  2. Multiply the number of pounds by 703.  ? For example, for a person who weighs 180 lb, multiply that number by 703, which equals 126,540.  3. Measure your height in inches. Then multiply that number by itself to get a measurement called \"inches squared.\"  ? For example, for a person who is 70 inches tall, the \"inches squared\" measurement is 70 inches x 70 inches, which equals 4,900 inches squared.  4. Divide the total from step 2 (number of lb x 703) by the total from step 3 (inches squared): 126,540 ÷ 4,900 = 25.8. This is " "your BMI.  To calculate your BMI in metric measurements:  1. Measure your weight in kilograms (kg).  2. Measure your height in meters (m). Then multiply that number by itself to get a measurement called \"meters squared.\"  ? For example, for a person who is 1.75 m tall, the \"meters squared\" measurement is 1.75 m x 1.75 m, which is equal to 3.1 meters squared.  3. Divide the number of kilograms (your weight) by the meters squared number. In this example: 70 ÷ 3.1 = 22.6. This is your BMI.  What do the results mean?  BMI charts are used to identify whether you are underweight, normal weight, overweight, or obese. The following guidelines will be used:  · Underweight: BMI less than 18.5.  · Normal weight: BMI between 18.5 and 24.9.  · Overweight: BMI between 25 and 29.9.  · Obese: BMI of 30 or above.  Keep these notes in mind:  · Weight includes both fat and muscle, so someone with a muscular build, such as an athlete, may have a BMI that is higher than 24.9. In cases like these, BMI is not an accurate measure of body fat.  · To determine if excess body fat is the cause of a BMI of 25 or higher, further assessments may need to be done by a health care provider.  · BMI is usually interpreted in the same way for men and women.  Where to find more information  For more information about BMI, including tools to quickly calculate your BMI, go to these websites:  · Centers for Disease Control and Prevention: www.cdc.gov  · American Heart Association: www.heart.org  · National Heart, Lung, and Blood [place]: www.nhlbi.nih.gov  Summary  · Body mass index (BMI) is a number that is calculated from a person's weight and height.  · BMI may help estimate how much of a person's weight is composed of fat. BMI can help identify those who may be at higher risk for certain medical problems.  · BMI can be measured using English measurements or metric measurements.  · BMI charts are used to identify whether you are underweight, normal " weight, overweight, or obese.  This information is not intended to replace advice given to you by your health care provider. Make sure you discuss any questions you have with your health care provider.  Document Revised: 09/09/2020 Document Reviewed: 07/17/2020  Elsevier Patient Education © 2021 [employer]

## 2021-08-26 ENCOUNTER — OFFICE VISIT (OUTPATIENT)
Dept: FAMILY MEDICINE CLINIC | Facility: CLINIC | Age: 76
End: 2021-08-26

## 2021-08-26 VITALS
SYSTOLIC BLOOD PRESSURE: 138 MMHG | BODY MASS INDEX: 29.02 KG/M2 | WEIGHT: 153.7 LBS | HEIGHT: 61 IN | DIASTOLIC BLOOD PRESSURE: 80 MMHG | OXYGEN SATURATION: 97 % | HEART RATE: 61 BPM | TEMPERATURE: 98.6 F

## 2021-08-26 DIAGNOSIS — I63.39 CEREBROVASCULAR ACCIDENT (CVA) DUE TO THROMBOSIS OF OTHER CEREBRAL ARTERY (HCC): ICD-10-CM

## 2021-08-26 DIAGNOSIS — E78.2 MIXED HYPERLIPIDEMIA: ICD-10-CM

## 2021-08-26 DIAGNOSIS — R00.1 BRADYCARDIA, SINUS: ICD-10-CM

## 2021-08-26 DIAGNOSIS — Z11.59 NEED FOR HEPATITIS C SCREENING TEST: ICD-10-CM

## 2021-08-26 DIAGNOSIS — Z00.00 ANNUAL PHYSICAL EXAM: ICD-10-CM

## 2021-08-26 DIAGNOSIS — I10 ESSENTIAL HYPERTENSION: ICD-10-CM

## 2021-08-26 DIAGNOSIS — Z76.89 ENCOUNTER TO ESTABLISH CARE: Primary | ICD-10-CM

## 2021-08-26 DIAGNOSIS — E21.0 PRIMARY HYPERPARATHYROIDISM (HCC): ICD-10-CM

## 2021-08-26 PROCEDURE — 99214 OFFICE O/P EST MOD 30 MIN: CPT | Performed by: NURSE PRACTITIONER

## 2021-08-26 RX ORDER — LOSARTAN POTASSIUM 50 MG/1
50 TABLET ORAL NIGHTLY
Qty: 90 TABLET | Refills: 3 | Status: SHIPPED | OUTPATIENT
Start: 2021-08-26 | End: 2021-12-02 | Stop reason: SDUPTHER

## 2021-08-26 NOTE — PROGRESS NOTES
Subjective   Josi Roe is a 75 y.o. female.     CC: Establish care-hypertension, hyperlipidemia, bradycardia, history of CVA, hyperparathyroidism    Hypertension  This is a chronic problem. The current episode started more than 1 year ago. The problem is controlled. Pertinent negatives include no blurred vision, chest pain, headaches, palpitations or shortness of breath. Risk factors for coronary artery disease include family history, dyslipidemia and sedentary lifestyle. Current antihypertension treatment includes calcium channel blockers and angiotensin blockers. The current treatment provides significant improvement. There are no compliance problems.  Hypertensive end-organ damage includes CVA.   Hyperlipidemia  This is a chronic problem. The current episode started more than 1 year ago. The problem is controlled. Recent lipid tests were reviewed and are variable. She has no history of obesity. Factors aggravating her hyperlipidemia include fatty foods. Pertinent negatives include no chest pain, focal sensory loss, focal weakness, leg pain, myalgias or shortness of breath. Current antihyperlipidemic treatment includes statins. The current treatment provides significant improvement of lipids. Compliance problems include adherence to exercise and adherence to diet.  Risk factors for coronary artery disease include family history, dyslipidemia, hypertension, a sedentary lifestyle and post-menopausal.   Cerebrovascular Accident  This is a chronic problem. The current episode started more than 1 month ago. Progression since onset: stable, no new cva symptoms. Pertinent negatives include no abdominal pain, arthralgias, chest pain, chills, congestion, coughing, diaphoresis, fatigue, fever, headaches, myalgias, nausea, rash, sore throat, vomiting or weakness. Nothing aggravates the symptoms. Treatments tried: asa, lipitor, plavix, amlodipine, losartan. The treatment provided significant relief.        The  following portions of the patient's history were reviewed and updated as appropriate: allergies, current medications, past family history, past medical history, past social history, past surgical history and problem list.    Review of Systems   Constitutional: Negative for activity change, appetite change, chills, diaphoresis, fatigue, fever, unexpected weight gain and unexpected weight loss.   HENT: Negative for congestion, sore throat, trouble swallowing and voice change.    Eyes: Negative for blurred vision, double vision, photophobia, pain and visual disturbance.   Respiratory: Negative for cough, chest tightness, shortness of breath and wheezing.    Cardiovascular: Negative for chest pain, palpitations and leg swelling.   Gastrointestinal: Negative for abdominal distention, abdominal pain, anal bleeding, blood in stool, constipation, diarrhea, nausea, vomiting, GERD and indigestion.   Endocrine: Negative for cold intolerance, heat intolerance, polydipsia, polyphagia and polyuria.   Genitourinary: Negative for dysuria, frequency, hematuria and urgency.   Musculoskeletal: Positive for gait problem (walks with walker or single crutch due to chronic feet issues and recent CVA). Negative for arthralgias and myalgias.   Skin: Negative for rash.   Allergic/Immunologic: Negative.    Neurological: Negative for dizziness, focal weakness, syncope, weakness, light-headedness and headache.   Hematological: Negative.    Psychiatric/Behavioral: The patient is not nervous/anxious.        Objective   Physical Exam  Vitals and nursing note reviewed.   Constitutional:       General: She is not in acute distress.     Appearance: Normal appearance. She is well-developed and normal weight. She is not ill-appearing, toxic-appearing or diaphoretic.   HENT:      Head: Normocephalic and atraumatic.      Right Ear: External ear normal.      Left Ear: External ear normal.      Nose: Nose normal.   Eyes:      Conjunctiva/sclera:  Conjunctivae normal.      Pupils: Pupils are equal, round, and reactive to light.   Neck:      Thyroid: No thyromegaly.      Vascular: No carotid bruit.      Trachea: No tracheal deviation.   Cardiovascular:      Rate and Rhythm: Normal rate and regular rhythm.      Heart sounds: Normal heart sounds. No murmur heard.   No friction rub. No gallop.    Pulmonary:      Effort: Pulmonary effort is normal. No respiratory distress.      Breath sounds: Normal breath sounds. No stridor. No wheezing, rhonchi or rales.   Abdominal:      General: Bowel sounds are normal. There is no distension.      Palpations: Abdomen is soft. There is no mass.      Tenderness: There is no abdominal tenderness. There is no guarding or rebound.      Hernia: No hernia is present.   Musculoskeletal:         General: No tenderness. Normal range of motion.      Cervical back: Normal range of motion and neck supple.   Lymphadenopathy:      Cervical: No cervical adenopathy.   Skin:     General: Skin is warm and dry.      Coloration: Skin is not pale.      Findings: No erythema or rash.   Neurological:      Mental Status: She is alert and oriented to person, place, and time. Mental status is at baseline.      Cranial Nerves: No cranial nerve deficit.      Coordination: Coordination normal.      Gait: Gait abnormal (walking with single crutch today ).   Psychiatric:         Mood and Affect: Mood normal.         Behavior: Behavior normal.         Thought Content: Thought content normal.         Judgment: Judgment normal.           Assessment/Plan   Diagnoses and all orders for this visit:    1. Encounter to establish care (Primary)    2. Annual physical exam  -     Basic Metabolic Panel; Future  -     CBC & Differential; Future  -     Hemoglobin A1c; Future  -     Lipid Panel; Future  -     Hepatitis C Antibody; Future, will call with results.    3. Need for hepatitis C screening test  -     Hepatitis C Antibody; Future, will call with results.    4.  Essential hypertension  -    Controlled.  Losartan refill.  Continue amlodipine as prescribed.  We will continue to monitor.  -Refill, losartan (COZAAR) 50 MG tablet; Take 1 tablet by mouth Every Night. 2 tabs  Dispense: 90 tablet; Refill: 3    5. Mixed hyperlipidemia  -     Lipid Panel; Future, will call with results.  Continue low-fat diet Lipitor as prescribed.    6. Primary hyperparathyroidism (CMS/HCC)   -Patient recently underwent parathyroidectomy without complication.  Patient has followed up with general surgery.  She has another follow-up with general surgery November.  Follow-up as scheduled.    7. Cerebrovascular accident (CVA) due to thrombosis of other cerebral artery (CMS/HCC)   -No reported new CVA symptoms.  Continue amlodipine, baby aspirin, Lipitor, losartan and Plavix as prescribed.  We will continue to monitor.    8. Bradycardia, sinus   -Heart rate 61 in office today.  Patient asymptomatic.  We will continue to monitor.  Continue follow-up with cardiology as scheduled.    9.  Follow-up in 3 months or sooner for any acute needs.          This document has been electronically signed by FIDEL Urbina on August 26, 2021 14:01 CDT

## 2021-12-02 ENCOUNTER — OFFICE VISIT (OUTPATIENT)
Dept: FAMILY MEDICINE CLINIC | Facility: CLINIC | Age: 76
End: 2021-12-02

## 2021-12-02 VITALS
TEMPERATURE: 97.3 F | WEIGHT: 157 LBS | RESPIRATION RATE: 20 BRPM | OXYGEN SATURATION: 97 % | HEART RATE: 86 BPM | HEIGHT: 61 IN | BODY MASS INDEX: 29.64 KG/M2 | DIASTOLIC BLOOD PRESSURE: 98 MMHG | SYSTOLIC BLOOD PRESSURE: 152 MMHG

## 2021-12-02 DIAGNOSIS — Z00.00 MEDICARE ANNUAL WELLNESS VISIT, INITIAL: Primary | ICD-10-CM

## 2021-12-02 DIAGNOSIS — I10 ESSENTIAL HYPERTENSION: ICD-10-CM

## 2021-12-02 DIAGNOSIS — E21.0 PRIMARY HYPERPARATHYROIDISM (HCC): ICD-10-CM

## 2021-12-02 DIAGNOSIS — E78.2 MIXED HYPERLIPIDEMIA: ICD-10-CM

## 2021-12-02 PROCEDURE — 1159F MED LIST DOCD IN RCRD: CPT | Performed by: NURSE PRACTITIONER

## 2021-12-02 PROCEDURE — 96160 PT-FOCUSED HLTH RISK ASSMT: CPT | Performed by: NURSE PRACTITIONER

## 2021-12-02 PROCEDURE — 1170F FXNL STATUS ASSESSED: CPT | Performed by: NURSE PRACTITIONER

## 2021-12-02 PROCEDURE — 1126F AMNT PAIN NOTED NONE PRSNT: CPT | Performed by: NURSE PRACTITIONER

## 2021-12-02 PROCEDURE — G0438 PPPS, INITIAL VISIT: HCPCS | Performed by: NURSE PRACTITIONER

## 2021-12-02 PROCEDURE — 99214 OFFICE O/P EST MOD 30 MIN: CPT | Performed by: NURSE PRACTITIONER

## 2021-12-02 RX ORDER — LOSARTAN POTASSIUM 100 MG/1
100 TABLET ORAL NIGHTLY
Qty: 30 TABLET | Refills: 3 | Status: SHIPPED | OUTPATIENT
Start: 2021-12-02 | End: 2022-03-03

## 2021-12-02 NOTE — PROGRESS NOTES
The ABCs of the Annual Wellness Visit  Initial Medicare Wellness Visit    Chief Complaint   Patient presents with   • Medicare Wellness-Initial Visit     Subjective   History of Present Illness:  Josi Roe is a 76 y.o. female who presents for an Initial Medicare Wellness Visit.    The following portions of the patient's history were reviewed and   updated as appropriate: allergies, current medications, past family history, past medical history, past social history, past surgical history and problem list.     Compared to one year ago, the patient feels her physical   health is the same.    Compared to one year ago, the patient feels her mental   health is the same.    Recent Hospitalizations:  This patient has had a [facility] admission record on file within the last 365 days.    Current Medical Providers:  Patient Care Team:  Misha Buck APRN as PCP - General (Nurse Practitioner)    Outpatient Medications Prior to Visit   Medication Sig Dispense Refill   • amLODIPine (NORVASC) 10 MG tablet Take 10 mg by mouth Every Night.     • aspirin 81 MG EC tablet Take 81 mg by mouth Daily.     • atorvastatin (LIPITOR) 80 MG tablet Take 80 mg by mouth Every Night.     • CALCIUM CITRATE PO Take  by mouth Daily.     • clopidogrel (PLAVIX) 75 MG tablet Take 75 mg by mouth Daily.     • losartan (COZAAR) 50 MG tablet Take 1 tablet by mouth Every Night. 2 tabs 90 tablet 3   • Magnesium 250 MG tablet Take  by mouth Daily.     • Probiotic Product (PROBIOTIC DAILY PO) Take  by mouth Daily.     • VITAMIN D, CHOLECALCIFEROL, PO Take 125 mcg by mouth Daily. 2 caps       No facility-administered medications prior to visit.       No opioid medication identified on active medication list. I have reviewed chart for other potential  high risk medication/s and harmful drug interactions in the elderly.          Aspirin is on active medication list. Aspirin use is indicated based on review of current medical condition/s. Pros  "and cons of this therapy have been discussed today. Benefits of this medication outweigh potential harm.  Patient has been encouraged to continue taking this medication.  .      Patient Active Problem List   Diagnosis   • Cerebrovascular accident (CVA) (HCC)   • Primary hyperparathyroidism (HCC)   • Essential hypertension   • Mixed hyperlipidemia   • Bradycardia, sinus     Advance Care Planning  Advance Directive is not on file.  ACP discussion was held with the patient during this visit. Patient does not have an advance directive, information provided.          Objective       Vitals:    12/02/21 1400   BP: 152/98   BP Location: Left arm   Patient Position: Sitting   Cuff Size: Adult   Pulse: 86   Resp: 20   Temp: 97.3 °F (36.3 °C)   TempSrc: Infrared   SpO2: 97%   Weight: 71.2 kg (157 lb)   Height: 154.9 cm (61\")   PainSc: 0-No pain     BMI Readings from Last 1 Encounters:   12/02/21 29.66 kg/m²   BMI is above normal parameters. Recommendations include: nutrition counseling    Does the patient have evidence of cognitive impairment? No    Physical Exam          HEALTH RISK ASSESSMENT    Smoking Status:  Social History     Tobacco Use   Smoking Status Never Smoker   Smokeless Tobacco Never Used   Tobacco Comment    passive occasionally     Alcohol Consumption:  Social History     Substance and Sexual Activity   Alcohol Use Yes    Comment: socially     Fall Risk Screen:    NANCYADI Fall Risk Assessment was completed, and patient is at HIGH risk for falls. Assessment completed on:12/2/2021    Depression Screen:   PHQ-2/PHQ-9 Depression Screening 12/2/2021   Little interest or pleasure in doing things 0   Feeling down, depressed, or hopeless 0   Trouble falling or staying asleep, or sleeping too much 0   Feeling tired or having little energy 0   Poor appetite or overeating 0   Feeling bad about yourself - or that you are a failure or have let yourself or your family down 0   Trouble concentrating on things, such as " reading the newspaper or watching television 0   Moving or speaking so slowly that other people could have noticed. Or the opposite - being so fidgety or restless that you have been moving around a lot more than usual 0   Thoughts that you would be better off dead, or of hurting yourself in some way 0   Total Score 0   If you checked off any problems, how difficult have these problems made it for you to do your work, take care of things at home, or get along with other people? Not difficult at all       Health Habits and Functional and Cognitive Screening:  Functional & Cognitive Status 12/2/2021   Do you have difficulty preparing food and eating? No   Do you have difficulty bathing yourself, getting dressed or grooming yourself? No   Do you have difficulty using the toilet? No   Do you have difficulty moving around from place to place? Yes   Do you have trouble with steps or getting out of a bed or a chair? Yes   Current Diet Well Balanced Diet   Dental Exam Not up to date   Eye Exam Not up to date   Exercise (times per week) 5 times per week   Current Exercises Include Walking   Do you need help using the phone?  No   Are you deaf or do you have serious difficulty hearing?  No   Do you need help with transportation? No   Do you need help shopping? No   Do you need help preparing meals?  No   Do you need help with housework?  No   Do you need help with laundry? No   Do you need help taking your medications? No   Do you need help managing money? No   Do you ever drive or ride in a car without wearing a seat belt? No   Have you felt unusual stress, anger or loneliness in the last month? No   Who do you live with? Alone   If you need help, do you have trouble finding someone available to you? Yes   Have you been bothered in the last four weeks by sexual problems? No   Do you have difficulty concentrating, remembering or making decisions? No       Age-appropriate Screening Schedule:  Refer to the list below for future  screening recommendations based on patient's age, sex and/or medical conditions. Orders for these recommended tests are listed in the plan section. The patient has been provided with a written plan.    Health Maintenance   Topic Date Due   • TDAP/TD VACCINES (1 - Tdap) Never done   • ZOSTER VACCINE (1 of 2) Never done   • INFLUENZA VACCINE  Never done   • LIPID PANEL  03/08/2022   • DXA SCAN  08/07/2022   • MAMMOGRAM  05/03/2023            Assessment/Plan   CMS Preventative Services Quick Reference  Risk Factors Identified During Encounter  Fall Risk-High or Moderate  Immunizations Discussed/Encouraged (specific Immunizations; Tdap, Influenza, Shingrix and COVID19  Polypharmacy  The above risks/problems have been discussed with the patient.  Follow up actions/plans if indicated are seen below in the Assessment/Plan Section.  Pertinent information has been shared with the patient in the After Visit Summary.    Diagnoses and all orders for this visit:    1. Medicare annual wellness visit, initial (Primary)    2. Essential hypertension    3. Primary hyperparathyroidism (HCC)    4. Mixed hyperlipidemia        Follow Up:  No follow-ups on file.     An After Visit Summary and PPPS were made available to the patient.        I spent 30 minutes caring for Josi on this date of service. This time includes time spent by me in the following activities:preparing for the visit, reviewing tests, performing a medically appropriate examination and/or evaluation , counseling and educating the patient/family/caregiver and documenting information in the medical record

## 2021-12-02 NOTE — PATIENT INSTRUCTIONS
Medicare Wellness  Personal Prevention Plan of Service     Date of Office Visit:  2021  Encounter Provider:  FIDEL Urbina  Place of Service:  [facility] [place]  Patient Name: Josi Roe  :  1945    As part of the Medicare Wellness portion of your visit today, we are providing you with this personalized preventive plan of services (PPPS). This plan is based upon recommendations of the [place] Preventive Services Task Force (USPSTF) and the Advisory Committee on Immunization Practices (ACIP).    This lists the preventive care services that should be considered, and provides dates of when you are due. Items listed as completed are up-to-date and do not require any further intervention.    Health Maintenance   Topic Date Due   • COLORECTAL CANCER SCREENING  Never done   • Pneumococcal Vaccine 65+ (1 of 1 - PPSV23) Never done   • HEPATITIS C SCREENING  Never done   • COVID-19 Vaccine (3 - Booster for Moderna series) 2021   • ZOSTER VACCINE (1 of 2) 2021 (Originally 1997)   • INFLUENZA VACCINE  2022 (Originally 2021)   • TDAP/TD VACCINES (1 - Tdap) 2022 (Originally 1966)   • LIPID PANEL  2022   • DXA SCAN  2022   • ANNUAL WELLNESS VISIT  2022   • MAMMOGRAM  2023       No orders of the defined types were placed in this encounter.      No follow-ups on file.

## 2021-12-02 NOTE — PROGRESS NOTES
Subjective   Josi Roe is a 76 y.o. female.     CC: Hypertension, hyperparathyroidism, hyperlipidemia    Hypertension  This is a chronic problem. The current episode started more than 1 year ago. The problem is uncontrolled. Pertinent negatives include no chest pain, headaches, palpitations or shortness of breath. Risk factors for coronary artery disease include family history, dyslipidemia and sedentary lifestyle. Current antihypertension treatment includes calcium channel blockers and angiotensin blockers. The current treatment provides moderate improvement. Compliance problems include exercise and diet.  Hypertensive end-organ damage includes CVA.   Hyperlipidemia  This is a chronic problem. The current episode started more than 1 year ago. Recent lipid tests were reviewed and are variable. Exacerbating diseases include obesity. Factors aggravating her hyperlipidemia include fatty foods. Pertinent negatives include no chest pain, focal weakness, leg pain, myalgias or shortness of breath. Current antihyperlipidemic treatment includes statins. The current treatment provides significant improvement of lipids. Compliance problems include adherence to exercise and adherence to diet.  Risk factors for coronary artery disease include family history, dyslipidemia, hypertension, obesity, a sedentary lifestyle and post-menopausal.        The following portions of the patient's history were reviewed and updated as appropriate: allergies, current medications, past family history, past medical history, past social history, past surgical history and problem list.    Review of Systems   Constitutional: Negative for activity change, appetite change, chills, fatigue, fever, unexpected weight gain and unexpected weight loss.   HENT: Negative for congestion, sore throat, trouble swallowing and voice change.    Eyes: Negative.    Respiratory: Negative for cough, chest tightness, shortness of breath and wheezing.     Cardiovascular: Negative for chest pain, palpitations and leg swelling.   Gastrointestinal: Negative for abdominal pain, constipation, diarrhea, nausea and vomiting.   Endocrine: Negative.  Negative for cold intolerance, heat intolerance, polydipsia, polyphagia and polyuria.   Genitourinary: Negative for dysuria.   Musculoskeletal: Negative for arthralgias and myalgias.   Skin: Negative for rash.   Allergic/Immunologic: Negative.    Neurological: Negative.  Negative for focal weakness.   Hematological: Negative.    Psychiatric/Behavioral: Negative.        Objective   Physical Exam  Vitals and nursing note reviewed.   Constitutional:       General: She is not in acute distress.     Appearance: Normal appearance. She is well-developed. She is not ill-appearing, toxic-appearing or diaphoretic.   HENT:      Head: Normocephalic and atraumatic.   Eyes:      Conjunctiva/sclera: Conjunctivae normal.   Cardiovascular:      Rate and Rhythm: Normal rate and regular rhythm.      Heart sounds: Normal heart sounds.   Pulmonary:      Effort: Pulmonary effort is normal. No respiratory distress.      Breath sounds: Normal breath sounds. No stridor. No wheezing, rhonchi or rales.   Musculoskeletal:         General: No tenderness. Normal range of motion.      Cervical back: Normal range of motion.   Skin:     General: Skin is warm and dry.      Coloration: Skin is not pale.      Findings: No erythema or rash.   Neurological:      Mental Status: She is alert and oriented to person, place, and time.   Psychiatric:         Mood and Affect: Mood normal.         Behavior: Behavior normal.         Thought Content: Thought content normal.         Judgment: Judgment normal.           Assessment/Plan   Diagnoses and all orders for this visit:    1. Medicare annual wellness visit, initial (Primary)    2. Essential hypertension  -     Blood pressure mildly elevated.  Will increase losartan to 100 mg daily.  Continue amlodipine as prescribed.   We will continue to monitor.  -Losartan (COZAAR) 100 MG tablet; Take 1 tablet by mouth Every Night. 2 tabs  Dispense: 30 tablet; Refill: 3    3. Primary hyperparathyroidism (HCC)   -Patient has labs ordered.  Instructed patient to present to the lab to have these collected.  Will call with results.  Currently asymptomatic.    4. Mixed hyperlipidemia   -Lipid panel has been ordered.  Instructed patient to present to lab to have this completed.  Continue Lipitor and low-fat diet.  We will continue to monitor.    5.  Follow-up in 3 months or sooner for any acute needs.          This document has been electronically signed by FIDEL Urbina on December 2, 2021 17:38 CST

## 2021-12-30 DIAGNOSIS — I10 ESSENTIAL (PRIMARY) HYPERTENSION: ICD-10-CM

## 2021-12-30 RX ORDER — AMLODIPINE BESYLATE 10 MG/1
TABLET ORAL
Qty: 90 TABLET | Refills: 1 | Status: SHIPPED | OUTPATIENT
Start: 2021-12-30 | End: 2022-03-03 | Stop reason: SDUPTHER

## 2022-03-03 ENCOUNTER — OFFICE VISIT (OUTPATIENT)
Dept: FAMILY MEDICINE CLINIC | Facility: CLINIC | Age: 77
End: 2022-03-03

## 2022-03-03 VITALS
RESPIRATION RATE: 20 BRPM | DIASTOLIC BLOOD PRESSURE: 98 MMHG | HEIGHT: 61 IN | SYSTOLIC BLOOD PRESSURE: 168 MMHG | OXYGEN SATURATION: 96 % | HEART RATE: 81 BPM | BODY MASS INDEX: 34.95 KG/M2 | WEIGHT: 185.1 LBS | TEMPERATURE: 97.1 F

## 2022-03-03 DIAGNOSIS — E21.0 PRIMARY HYPERPARATHYROIDISM: ICD-10-CM

## 2022-03-03 DIAGNOSIS — I10 ESSENTIAL (PRIMARY) HYPERTENSION: ICD-10-CM

## 2022-03-03 DIAGNOSIS — Z23 NEED FOR PNEUMOCOCCAL VACCINATION: ICD-10-CM

## 2022-03-03 DIAGNOSIS — I10 ESSENTIAL HYPERTENSION: ICD-10-CM

## 2022-03-03 DIAGNOSIS — E78.2 MIXED HYPERLIPIDEMIA: Primary | ICD-10-CM

## 2022-03-03 PROCEDURE — 90732 PPSV23 VACC 2 YRS+ SUBQ/IM: CPT | Performed by: NURSE PRACTITIONER

## 2022-03-03 PROCEDURE — 99214 OFFICE O/P EST MOD 30 MIN: CPT | Performed by: NURSE PRACTITIONER

## 2022-03-03 PROCEDURE — G0009 ADMIN PNEUMOCOCCAL VACCINE: HCPCS | Performed by: NURSE PRACTITIONER

## 2022-03-03 RX ORDER — LOSARTAN POTASSIUM 100 MG/1
TABLET ORAL
Qty: 90 TABLET | Refills: 1 | OUTPATIENT
Start: 2022-03-03

## 2022-03-03 RX ORDER — AMLODIPINE BESYLATE 10 MG/1
10 TABLET ORAL DAILY
Qty: 90 TABLET | Refills: 3 | Status: SHIPPED | OUTPATIENT
Start: 2022-03-03

## 2022-03-03 RX ORDER — LOSARTAN POTASSIUM AND HYDROCHLOROTHIAZIDE 25; 100 MG/1; MG/1
1 TABLET ORAL DAILY
Qty: 90 TABLET | Refills: 3 | Status: SHIPPED | OUTPATIENT
Start: 2022-03-03 | End: 2022-05-31 | Stop reason: HOSPADM

## 2022-03-03 NOTE — PROGRESS NOTES
Subjective   Josi Roe is a 76 y.o. female.     CC: Hypertension, hyperlipidemia, hyperparathyroidism    Hypertension  This is a chronic problem. The current episode started more than 1 year ago. The problem is controlled. Pertinent negatives include no chest pain, headaches, palpitations or shortness of breath. Risk factors for coronary artery disease include family history, dyslipidemia, obesity and sedentary lifestyle. Current antihypertension treatment includes calcium channel blockers. The current treatment provides significant improvement. Compliance problems include exercise.  Hypertensive end-organ damage includes CVA.   Hyperlipidemia  This is a chronic problem. The current episode started more than 1 year ago. The problem is controlled. Exacerbating diseases include obesity. Factors aggravating her hyperlipidemia include fatty foods. Pertinent negatives include no chest pain, focal sensory loss, focal weakness, leg pain, myalgias or shortness of breath. Current antihyperlipidemic treatment includes statins. The current treatment provides significant improvement of lipids. There are no compliance problems.  Risk factors for coronary artery disease include family history, dyslipidemia, hypertension, obesity and a sedentary lifestyle.        The following portions of the patient's history were reviewed and updated as appropriate: allergies, current medications, past family history, past medical history, past social history, past surgical history and problem list.    Review of Systems   Constitutional: Negative for activity change, appetite change, chills, fatigue, fever, unexpected weight gain and unexpected weight loss.   HENT: Negative for congestion, sore throat, trouble swallowing and voice change.    Eyes: Negative.    Respiratory: Negative for cough, chest tightness, shortness of breath and wheezing.    Cardiovascular: Negative for chest pain, palpitations and leg swelling.   Gastrointestinal:  Negative for abdominal pain, constipation, diarrhea, nausea and vomiting.   Endocrine: Negative.    Genitourinary: Negative for dysuria.   Musculoskeletal: Negative for arthralgias and myalgias.   Skin: Negative for rash.   Allergic/Immunologic: Negative.    Neurological: Negative.  Negative for focal weakness.   Hematological: Negative.    Psychiatric/Behavioral: Negative.        Objective   Physical Exam  Vitals and nursing note reviewed.   Constitutional:       General: She is not in acute distress.     Appearance: Normal appearance. She is well-developed. She is obese. She is not ill-appearing, toxic-appearing or diaphoretic.   HENT:      Head: Normocephalic and atraumatic.   Eyes:      Conjunctiva/sclera: Conjunctivae normal.   Cardiovascular:      Rate and Rhythm: Normal rate and regular rhythm.      Heart sounds: Normal heart sounds.   Pulmonary:      Effort: Pulmonary effort is normal. No respiratory distress.      Breath sounds: Normal breath sounds. No stridor. No wheezing, rhonchi or rales.   Musculoskeletal:         General: No tenderness. Normal range of motion.      Cervical back: Normal range of motion.   Skin:     General: Skin is warm and dry.      Coloration: Skin is not pale.      Findings: No erythema or rash.   Neurological:      Mental Status: She is alert and oriented to person, place, and time.   Psychiatric:         Mood and Affect: Mood normal.         Behavior: Behavior normal.         Thought Content: Thought content normal.         Judgment: Judgment normal.           Assessment/Plan   Diagnoses and all orders for this visit:    1. Mixed hyperlipidemia (Primary)   -Patient has lipid panel has been ordered.  Instructed patient to present to the lab to get these completed.  Continue Lipitor and low-fat diet.  We will continue to monitor.    2. Primary hyperparathyroidism (HCC)   -Continue follow-up with endocrinology as scheduled.    3. Need for pneumococcal vaccination  -     pneumococcal  polysaccharide 23-valent (PNEUMOVAX-23) vaccine 0.5 mL, tolerated well with no adverse reaction.    4. Essential (primary) hypertension  -     losartan-hydrochlorothiazide (Hyzaar) 100-25 MG per tablet; Take 1 tablet by mouth Daily.  Dispense: 90 tablet; Refill: 3  -     amLODIPine (NORVASC) 10 MG tablet; Take 1 tablet by mouth Daily.  Dispense: 90 tablet; Refill: 3   -Continues to be elevated.  Continue amlodipine as prescribed.  We will add HCTZ to losartan as noted above.  We will continue to monitor.    5.  Follow-up in 3 months or sooner for any acute needs.            This document has been electronically signed by FIDEL Urbina on March 3, 2022 16:53 CST                  constant

## 2022-05-31 ENCOUNTER — OFFICE VISIT (OUTPATIENT)
Dept: FAMILY MEDICINE CLINIC | Facility: CLINIC | Age: 77
End: 2022-05-31

## 2022-05-31 VITALS
DIASTOLIC BLOOD PRESSURE: 90 MMHG | OXYGEN SATURATION: 96 % | SYSTOLIC BLOOD PRESSURE: 180 MMHG | WEIGHT: 187.1 LBS | HEIGHT: 61 IN | RESPIRATION RATE: 18 BRPM | HEART RATE: 107 BPM | TEMPERATURE: 97.1 F | BODY MASS INDEX: 35.33 KG/M2

## 2022-05-31 DIAGNOSIS — L81.9 ATYPICAL PIGMENTED SKIN LESION: ICD-10-CM

## 2022-05-31 DIAGNOSIS — I10 ESSENTIAL HYPERTENSION: Primary | ICD-10-CM

## 2022-05-31 DIAGNOSIS — E78.2 MIXED HYPERLIPIDEMIA: ICD-10-CM

## 2022-05-31 PROCEDURE — 99214 OFFICE O/P EST MOD 30 MIN: CPT | Performed by: NURSE PRACTITIONER

## 2022-05-31 RX ORDER — LOSARTAN POTASSIUM 100 MG/1
100 TABLET ORAL DAILY
Qty: 30 TABLET | Refills: 3 | Status: SHIPPED | OUTPATIENT
Start: 2022-05-31 | End: 2022-08-29

## 2022-05-31 RX ORDER — CHLORTHALIDONE 25 MG/1
25 TABLET ORAL DAILY
Qty: 30 TABLET | Refills: 3 | Status: SHIPPED | OUTPATIENT
Start: 2022-05-31 | End: 2022-08-29

## 2022-05-31 RX ORDER — OLMESARTAN MEDOXOMIL AND HYDROCHLOROTHIAZIDE 20/12.5 20; 12.5 MG/1; MG/1
1 TABLET ORAL DAILY
Qty: 30 TABLET | Refills: 11 | Status: CANCELLED | OUTPATIENT
Start: 2022-05-31 | End: 2023-05-31

## 2022-05-31 NOTE — PROGRESS NOTES
Subjective   Josi Roe is a 76 y.o. female.     CC: Hypertension, hyperlipidemia, dark mole left-sided neck    Hypertension  This is a chronic problem. The current episode started more than 1 year ago. The problem has been waxing and waning since onset. The problem is uncontrolled. Pertinent negatives include no chest pain, palpitations or shortness of breath. Risk factors for coronary artery disease include family history, dyslipidemia, obesity, sedentary lifestyle and post-menopausal state. Current antihypertension treatment includes calcium channel blockers, angiotensin blockers and diuretics. The current treatment provides significant improvement. Compliance problems include exercise and diet.  There is no history of angina, kidney disease or CAD/MI.   Hyperlipidemia  This is a chronic problem. The current episode started more than 1 year ago. Condition status: unknown, has failed to get labs completed.  Exacerbating diseases include obesity. Factors aggravating her hyperlipidemia include thiazides and fatty foods. Pertinent negatives include no chest pain, focal sensory loss, leg pain, myalgias or shortness of breath. Current antihyperlipidemic treatment includes statins. Improvement on treatment: unknown as noted above. Compliance problems include adherence to diet and adherence to exercise.  Risk factors for coronary artery disease include family history, dyslipidemia, hypertension, obesity, a sedentary lifestyle and post-menopausal.        The following portions of the patient's history were reviewed and updated as appropriate: allergies, current medications, past family history, past medical history, past social history, past surgical history and problem list.    Review of Systems   Constitutional: Negative for activity change, appetite change, chills, fatigue, fever, unexpected weight gain and unexpected weight loss.   HENT: Negative for congestion, sore throat, trouble swallowing and voice change.     Eyes: Negative.    Respiratory: Negative for cough, chest tightness, shortness of breath and wheezing.    Cardiovascular: Negative for chest pain, palpitations and leg swelling.   Gastrointestinal: Negative for abdominal pain, constipation, diarrhea, nausea and vomiting.   Endocrine: Negative.  Negative for cold intolerance, heat intolerance, polydipsia, polyphagia and polyuria.   Genitourinary: Negative for dysuria.   Musculoskeletal: Negative for arthralgias and myalgias.   Skin: Positive for skin lesions (dark mole left side of neck). Negative for rash.   Allergic/Immunologic: Negative.    Neurological: Negative.    Hematological: Negative.    Psychiatric/Behavioral: Negative.        Objective   Physical Exam  Vitals and nursing note reviewed.   Constitutional:       General: She is not in acute distress.     Appearance: Normal appearance. She is well-developed. She is obese. She is not ill-appearing, toxic-appearing or diaphoretic.   HENT:      Head: Normocephalic and atraumatic.   Eyes:      Conjunctiva/sclera: Conjunctivae normal.   Cardiovascular:      Rate and Rhythm: Normal rate and regular rhythm.      Heart sounds: Normal heart sounds.   Pulmonary:      Effort: Pulmonary effort is normal. No respiratory distress.      Breath sounds: Normal breath sounds. No stridor. No wheezing, rhonchi or rales.   Musculoskeletal:         General: No tenderness. Normal range of motion.      Cervical back: Normal range of motion.   Skin:     General: Skin is warm and dry.      Coloration: Skin is not pale.      Findings: Lesion (dark pea-sized mole to left side of neck) present. No erythema or rash.   Neurological:      Mental Status: She is alert and oriented to person, place, and time.   Psychiatric:         Mood and Affect: Mood normal.         Behavior: Behavior normal.         Thought Content: Thought content normal.         Judgment: Judgment normal.           Assessment & Plan   Diagnoses and all orders for this  visit:    1. Essential hypertension (Primary)  -     losartan (COZAAR) 100 MG tablet; Take 1 tablet by mouth Daily.  Dispense: 30 tablet; Refill: 3  -     chlorthalidone (HYGROTON) 25 MG tablet; Take 1 tablet by mouth Daily.  Dispense: 30 tablet; Refill: 3   - Blood pressure elevated as patient discontinue losartan/HCTZ.  She reports having GI side effects from the HCTZ.  We will restart losartan 100 mg daily and start a trial of chlorthalidone 25 mg daily.  Continue amlodipine and low-sodium diet.  We will continue to monitor.    2. Mixed hyperlipidemia   -Instructed patient to present to lab today to get labs collected.  Continue Lipitor and low-fat diet.  We will continue to monitor.    3. Atypical pigmented skin lesion   -Referral placed to dermatology for further evaluation.    4.  Follow-up in 1 month or sooner for any acute needs.            This document has been electronically signed by FIDEL Urbina on May 31, 2022 16:12 CDT

## 2022-06-30 ENCOUNTER — OFFICE VISIT (OUTPATIENT)
Dept: FAMILY MEDICINE CLINIC | Facility: CLINIC | Age: 77
End: 2022-06-30

## 2022-06-30 VITALS
HEART RATE: 78 BPM | SYSTOLIC BLOOD PRESSURE: 160 MMHG | DIASTOLIC BLOOD PRESSURE: 96 MMHG | RESPIRATION RATE: 18 BRPM | TEMPERATURE: 97.1 F | OXYGEN SATURATION: 97 % | HEIGHT: 61 IN | WEIGHT: 190.6 LBS | BODY MASS INDEX: 35.98 KG/M2

## 2022-06-30 DIAGNOSIS — I10 ESSENTIAL HYPERTENSION: Primary | ICD-10-CM

## 2022-06-30 PROCEDURE — 99213 OFFICE O/P EST LOW 20 MIN: CPT | Performed by: NURSE PRACTITIONER

## 2022-06-30 RX ORDER — ATENOLOL 25 MG/1
25 TABLET ORAL DAILY
Qty: 30 TABLET | Refills: 3 | Status: SHIPPED | OUTPATIENT
Start: 2022-06-30

## 2022-06-30 NOTE — PROGRESS NOTES
Subjective   Josi Roe is a 76 y.o. female.     CC: Hypertension    Hypertension  This is a chronic problem. The current episode started more than 1 year ago. The problem has been gradually improving since onset. The problem is uncontrolled. Pertinent negatives include no blurred vision, chest pain, palpitations or shortness of breath. Risk factors for coronary artery disease include family history, dyslipidemia, obesity and sedentary lifestyle. Current antihypertension treatment includes diuretics, calcium channel blockers and angiotensin blockers. The current treatment provides significant improvement. Compliance problems include exercise and diet.  There is no history of angina, kidney disease or CAD/MI.        The following portions of the patient's history were reviewed and updated as appropriate: allergies, current medications, past family history, past medical history, past social history, past surgical history and problem list.    Review of Systems   Constitutional: Negative for activity change, appetite change, chills, diaphoresis, fatigue, fever, unexpected weight gain and unexpected weight loss.   HENT: Negative for congestion, sore throat, trouble swallowing and voice change.    Eyes: Negative for blurred vision, double vision, photophobia, pain and visual disturbance.   Respiratory: Negative for cough, chest tightness, shortness of breath and wheezing.    Cardiovascular: Positive for leg swelling (stable). Negative for chest pain and palpitations.   Gastrointestinal: Negative for abdominal distention, abdominal pain, anal bleeding, blood in stool, constipation, diarrhea, nausea, vomiting, GERD and indigestion.   Endocrine: Negative for cold intolerance, heat intolerance, polydipsia, polyphagia and polyuria.   Genitourinary: Negative for dysuria, frequency, hematuria and urgency.   Musculoskeletal: Negative for arthralgias and myalgias.   Skin: Negative for rash.   Allergic/Immunologic: Negative.     Neurological: Negative for dizziness, syncope, weakness, light-headedness and headache.   Hematological: Negative.    Psychiatric/Behavioral: The patient is not nervous/anxious.        Objective   Physical Exam  Vitals and nursing note reviewed.   Constitutional:       General: She is not in acute distress.     Appearance: Normal appearance. She is well-developed. She is obese. She is not ill-appearing, toxic-appearing or diaphoretic.   HENT:      Head: Normocephalic and atraumatic.   Eyes:      Conjunctiva/sclera: Conjunctivae normal.   Cardiovascular:      Rate and Rhythm: Normal rate and regular rhythm.      Heart sounds: Normal heart sounds.   Pulmonary:      Effort: Pulmonary effort is normal. No respiratory distress.      Breath sounds: Normal breath sounds. No stridor. No wheezing, rhonchi or rales.   Musculoskeletal:         General: No tenderness. Normal range of motion.      Cervical back: Normal range of motion.      Right lower le+ Pitting Edema present.      Left lower le+ Pitting Edema present.   Skin:     General: Skin is warm and dry.      Coloration: Skin is not pale.      Findings: No erythema or rash.   Neurological:      Mental Status: She is alert and oriented to person, place, and time.   Psychiatric:         Behavior: Behavior normal.         Thought Content: Thought content normal.         Judgment: Judgment normal.           Assessment & Plan   Diagnoses and all orders for this visit:    1. Essential hypertension (Primary)  -     atenolol (Tenormin) 25 MG tablet; Take 1 tablet by mouth Daily.  Dispense: 30 tablet; Refill: 3   - Improving but still mildly elevated.  Continue amlodipine, chlorthalidone and losartan as prescribed.  We will add atenolol 25 mg p.o. daily.  Low-sodium diet.  We will continue to monitor.    2.  Follow-up in 3 months or sooner for any acute needs.            This document has been electronically signed by FIDEL Urbina on 2022 16:17 CDT

## 2022-08-27 DIAGNOSIS — I10 ESSENTIAL HYPERTENSION: ICD-10-CM

## 2022-08-29 RX ORDER — CHLORTHALIDONE 25 MG/1
TABLET ORAL
Qty: 90 TABLET | Refills: 1 | Status: SHIPPED | OUTPATIENT
Start: 2022-08-29

## 2022-08-29 RX ORDER — LOSARTAN POTASSIUM 100 MG/1
TABLET ORAL
Qty: 90 TABLET | Refills: 1 | Status: SHIPPED | OUTPATIENT
Start: 2022-08-29

## 2023-05-06 ENCOUNTER — HOSPITAL ENCOUNTER (EMERGENCY)
Facility: HOSPITAL | Age: 78
Discharge: HOME | End: 2023-05-06
Attending: STUDENT IN AN ORGANIZED HEALTH CARE EDUCATION/TRAINING PROGRAM
Payer: MEDICARE

## 2023-05-06 ENCOUNTER — APPOINTMENT (OUTPATIENT)
Dept: CT IMAGING | Facility: HOSPITAL | Age: 78
End: 2023-05-06
Payer: MEDICARE

## 2023-05-06 VITALS
BODY MASS INDEX: 32.1 KG/M2 | OXYGEN SATURATION: 95 % | WEIGHT: 170 LBS | RESPIRATION RATE: 16 BRPM | HEART RATE: 82 BPM | HEIGHT: 61 IN | TEMPERATURE: 98.2 F | SYSTOLIC BLOOD PRESSURE: 136 MMHG | DIASTOLIC BLOOD PRESSURE: 67 MMHG

## 2023-05-06 DIAGNOSIS — R10.9 FLANK PAIN: ICD-10-CM

## 2023-05-06 DIAGNOSIS — R11.2 NAUSEA AND VOMITING, UNSPECIFIED VOMITING TYPE: ICD-10-CM

## 2023-05-06 DIAGNOSIS — N28.9 RENAL LESION: Primary | ICD-10-CM

## 2023-05-06 LAB
ANION GAP SERPL CALCULATED.3IONS-SCNC: 12 MMOL/L (ref 5–15)
BACTERIA UR QL AUTO: ABNORMAL /HPF
BASOPHILS # BLD AUTO: 0.06 10*3/MM3 (ref 0–0.2)
BASOPHILS NFR BLD AUTO: 0.8 % (ref 0–1.5)
BILIRUB UR QL STRIP: NEGATIVE
BUN SERPL-MCNC: 24 MG/DL (ref 8–23)
BUN/CREAT SERPL: 25 (ref 7–25)
CALCIUM SPEC-SCNC: 9.7 MG/DL (ref 8.6–10.5)
CHLORIDE SERPL-SCNC: 102 MMOL/L (ref 98–107)
CLARITY UR: CLEAR
CO2 SERPL-SCNC: 26 MMOL/L (ref 22–29)
COLOR UR: YELLOW
CREAT SERPL-MCNC: 0.96 MG/DL (ref 0.57–1)
DEPRECATED RDW RBC AUTO: 40.2 FL (ref 37–54)
EGFRCR SERPLBLD CKD-EPI 2021: 61.8 ML/MIN/1.73
EOSINOPHIL # BLD AUTO: 0.06 10*3/MM3 (ref 0–0.4)
EOSINOPHIL NFR BLD AUTO: 0.8 % (ref 0.3–6.2)
ERYTHROCYTE [DISTWIDTH] IN BLOOD BY AUTOMATED COUNT: 13.1 % (ref 12.3–15.4)
GLUCOSE BLDC GLUCOMTR-MCNC: 128 MG/DL (ref 70–130)
GLUCOSE SERPL-MCNC: 137 MG/DL (ref 65–99)
GLUCOSE UR STRIP-MCNC: ABNORMAL MG/DL
HCT VFR BLD AUTO: 41.8 % (ref 34–46.6)
HGB BLD-MCNC: 14 G/DL (ref 12–15.9)
HGB UR QL STRIP.AUTO: NEGATIVE
HOLD SPECIMEN: NORMAL
HOLD SPECIMEN: NORMAL
HYALINE CASTS UR QL AUTO: ABNORMAL /LPF
IMM GRANULOCYTES # BLD AUTO: 0.02 10*3/MM3 (ref 0–0.05)
IMM GRANULOCYTES NFR BLD AUTO: 0.3 % (ref 0–0.5)
KETONES UR QL STRIP: NEGATIVE
LEUKOCYTE ESTERASE UR QL STRIP.AUTO: NEGATIVE
LYMPHOCYTES # BLD AUTO: 1.28 10*3/MM3 (ref 0.7–3.1)
LYMPHOCYTES NFR BLD AUTO: 16.1 % (ref 19.6–45.3)
MCH RBC QN AUTO: 28.3 PG (ref 26.6–33)
MCHC RBC AUTO-ENTMCNC: 33.5 G/DL (ref 31.5–35.7)
MCV RBC AUTO: 84.6 FL (ref 79–97)
MONOCYTES # BLD AUTO: 0.66 10*3/MM3 (ref 0.1–0.9)
MONOCYTES NFR BLD AUTO: 8.3 % (ref 5–12)
NEUTROPHILS NFR BLD AUTO: 5.86 10*3/MM3 (ref 1.7–7)
NEUTROPHILS NFR BLD AUTO: 73.7 % (ref 42.7–76)
NITRITE UR QL STRIP: NEGATIVE
NRBC BLD AUTO-RTO: 0 /100 WBC (ref 0–0.2)
PH UR STRIP.AUTO: 7 [PH] (ref 5–9)
PLATELET # BLD AUTO: 304 10*3/MM3 (ref 140–450)
PMV BLD AUTO: 11 FL (ref 6–12)
POTASSIUM SERPL-SCNC: 4 MMOL/L (ref 3.5–5.2)
PROT UR QL STRIP: ABNORMAL
QT INTERVAL: 594 MS
QTC INTERVAL: 557 MS
RBC # BLD AUTO: 4.94 10*6/MM3 (ref 3.77–5.28)
RBC # UR STRIP: ABNORMAL /HPF
REF LAB TEST METHOD: ABNORMAL
SODIUM SERPL-SCNC: 140 MMOL/L (ref 136–145)
SP GR UR STRIP: 1.02 (ref 1–1.03)
SQUAMOUS #/AREA URNS HPF: ABNORMAL /HPF
UROBILINOGEN UR QL STRIP: ABNORMAL
WBC # UR STRIP: ABNORMAL /HPF
WBC NRBC COR # BLD: 7.94 10*3/MM3 (ref 3.4–10.8)
WHOLE BLOOD HOLD COAG: NORMAL

## 2023-05-06 PROCEDURE — 96375 TX/PRO/DX INJ NEW DRUG ADDON: CPT

## 2023-05-06 PROCEDURE — 80048 BASIC METABOLIC PNL TOTAL CA: CPT | Performed by: STUDENT IN AN ORGANIZED HEALTH CARE EDUCATION/TRAINING PROGRAM

## 2023-05-06 PROCEDURE — 82948 REAGENT STRIP/BLOOD GLUCOSE: CPT

## 2023-05-06 PROCEDURE — 25010000002 KETOROLAC TROMETHAMINE PER 15 MG: Performed by: STUDENT IN AN ORGANIZED HEALTH CARE EDUCATION/TRAINING PROGRAM

## 2023-05-06 PROCEDURE — 25010000002 HYDRALAZINE PER 20 MG: Performed by: STUDENT IN AN ORGANIZED HEALTH CARE EDUCATION/TRAINING PROGRAM

## 2023-05-06 PROCEDURE — 96374 THER/PROPH/DIAG INJ IV PUSH: CPT

## 2023-05-06 PROCEDURE — 85025 COMPLETE CBC W/AUTO DIFF WBC: CPT | Performed by: STUDENT IN AN ORGANIZED HEALTH CARE EDUCATION/TRAINING PROGRAM

## 2023-05-06 PROCEDURE — 81001 URINALYSIS AUTO W/SCOPE: CPT | Performed by: STUDENT IN AN ORGANIZED HEALTH CARE EDUCATION/TRAINING PROGRAM

## 2023-05-06 PROCEDURE — 99284 EMERGENCY DEPT VISIT MOD MDM: CPT

## 2023-05-06 PROCEDURE — 74176 CT ABD & PELVIS W/O CONTRAST: CPT

## 2023-05-06 PROCEDURE — 25010000002 ONDANSETRON PER 1 MG: Performed by: STUDENT IN AN ORGANIZED HEALTH CARE EDUCATION/TRAINING PROGRAM

## 2023-05-06 PROCEDURE — 93005 ELECTROCARDIOGRAM TRACING: CPT | Performed by: STUDENT IN AN ORGANIZED HEALTH CARE EDUCATION/TRAINING PROGRAM

## 2023-05-06 RX ORDER — KETOROLAC TROMETHAMINE 15 MG/ML
15 INJECTION, SOLUTION INTRAMUSCULAR; INTRAVENOUS ONCE
Status: COMPLETED | OUTPATIENT
Start: 2023-05-06 | End: 2023-05-06

## 2023-05-06 RX ORDER — ONDANSETRON 2 MG/ML
4 INJECTION INTRAMUSCULAR; INTRAVENOUS ONCE
Status: COMPLETED | OUTPATIENT
Start: 2023-05-06 | End: 2023-05-06

## 2023-05-06 RX ORDER — HYDRALAZINE HYDROCHLORIDE 20 MG/ML
20 INJECTION INTRAMUSCULAR; INTRAVENOUS ONCE
Status: COMPLETED | OUTPATIENT
Start: 2023-05-06 | End: 2023-05-06

## 2023-05-06 RX ORDER — MORPHINE SULFATE 2 MG/ML
2 INJECTION, SOLUTION INTRAMUSCULAR; INTRAVENOUS ONCE
Status: DISCONTINUED | OUTPATIENT
Start: 2023-05-06 | End: 2023-05-06 | Stop reason: HOSPADM

## 2023-05-06 RX ORDER — ONDANSETRON 4 MG/1
4 TABLET, ORALLY DISINTEGRATING ORAL 4 TIMES DAILY PRN
Qty: 12 TABLET | Refills: 0 | Status: SHIPPED | OUTPATIENT
Start: 2023-05-06

## 2023-05-06 RX ADMIN — ONDANSETRON 4 MG: 2 INJECTION INTRAMUSCULAR; INTRAVENOUS at 07:15

## 2023-05-06 RX ADMIN — SODIUM CHLORIDE 1000 ML: 9 INJECTION, SOLUTION INTRAVENOUS at 08:50

## 2023-05-06 RX ADMIN — HYDRALAZINE HYDROCHLORIDE 20 MG: 20 INJECTION INTRAMUSCULAR; INTRAVENOUS at 07:56

## 2023-05-06 RX ADMIN — SODIUM CHLORIDE 1000 ML: 9 INJECTION, SOLUTION INTRAVENOUS at 07:15

## 2023-05-06 RX ADMIN — KETOROLAC TROMETHAMINE 15 MG: 15 INJECTION, SOLUTION INTRAMUSCULAR; INTRAVENOUS at 07:15

## 2023-05-06 NOTE — ED PROVIDER NOTES
Subjective   History of Present Illness  77-year-old female comes to the ER chief complaint of right flank pain radiating to her groin this been present for the last couple of days.  Last night she had nausea and vomiting.  It is sharp in nature.  Denies urinary symptoms.  Nothing seems to make it better or worse.  Denies other symptoms.    History provided by:  Patient   used: No        Review of Systems   Constitutional: Negative for chills and fever.   HENT: Negative for drooling.    Eyes: Negative for redness.   Respiratory: Negative for shortness of breath.    Cardiovascular: Negative for chest pain.   Gastrointestinal: Positive for abdominal pain, nausea and vomiting. Negative for constipation and diarrhea.   Genitourinary: Positive for flank pain. Negative for dysuria and urgency.   Skin: Negative for color change.   Neurological: Negative for seizures.   Psychiatric/Behavioral: Positive for sleep disturbance. Negative for confusion.       Past Medical History:   Diagnosis Date   • Arthritis    • Heart murmur    • HL (hearing loss)    • Hyperlipidemia    • Hypertension    • PONV (postoperative nausea and vomiting)    • Stroke        Allergies   Allergen Reactions   • Novocain [Procaine] Other (See Comments)     Passes Out       Past Surgical History:   Procedure Laterality Date   • CHOLECYSTECTOMY     • CLUB FOOT RELEASE Bilateral    • HYSTERECTOMY     • LAPAROSCOPIC TUBAL LIGATION     • ORIF WRIST FRACTURE Left    • PARATHYROIDECTOMY N/A 7/14/2021    Procedure: PARATHYROIDECTOMY;  Surgeon: Kevin Stahl MD;  Location: [facility];  Service: General;  Laterality: N/A;       Family History   Problem Relation Age of Onset   • Thyroid disease Mother    • Hypertension Mother    • Kidney failure Mother    • COPD Mother    • Hyperlipidemia Mother    • Cancer Father    • Lung cancer Father    • Alcohol abuse Father    • Thyroid disease Sister    • Heart disease Sister         has  pacemaker   • Atrial fibrillation Sister    • Hypertension Sister    • Cancer Paternal Aunt    • Cancer Paternal Uncle    • Heart disease Paternal Uncle    • Diabetes Maternal Grandmother         great grandmother   • Diabetes Maternal Grandfather    • Cancer Maternal Grandfather    • Esophageal cancer Maternal Grandfather    • No Known Problems Paternal Grandmother    • No Known Problems Paternal Grandfather    • Drug abuse Brother        Social History     Socioeconomic History   • Marital status:    Tobacco Use   • Smoking status: Never   • Smokeless tobacco: Never   • Tobacco comments:     passive occasionally   Vaping Use   • Vaping Use: Never used   Substance and Sexual Activity   • Alcohol use: Not Currently     Comment: socially   • Drug use: Never   • Sexual activity: Not Currently           Objective    Vitals:    05/06/23 0830 05/06/23 0831 05/06/23 0847 05/06/23 0957   BP:  160/74 121/58 136/67   Pulse: 82 82 67 82   Resp:   16 16   Temp:       TempSrc:       SpO2: 93% 92% 95% 95%   Weight:       Height:           Physical Exam  Vitals and nursing note reviewed.   Constitutional:       General: She is not in acute distress.     Appearance: She is well-developed. She is obese. She is not ill-appearing, toxic-appearing or diaphoretic.   Pulmonary:      Effort: Pulmonary effort is normal. No accessory muscle usage or respiratory distress.   Chest:      Chest wall: No tenderness.   Abdominal:      Palpations: Abdomen is soft.      Tenderness: There is no abdominal tenderness (deep palpation). There is no right CVA tenderness, left CVA tenderness, guarding or rebound.   Skin:     General: Skin is warm and dry.      Capillary Refill: Capillary refill takes less than 2 seconds.   Neurological:      Mental Status: She is alert and oriented to person, place, and time.         ECG 12 Lead      Date/Time: 5/6/2023 10:08 AM  Performed by: Angel Weinberg MD  Authorized by: Angel Weinberg MD    Interpreted by physician  Rhythm: sinus bradycardia  Rate: bradycardic  BPM: 53  QRS axis: normal  ST segment elevation noted on lead: none.  Other findings: prolonged QTc interval  Clinical impression: abnormal ECG                 ED Course      Results for orders placed or performed during the hospital encounter of 05/06/23   Basic Metabolic Panel    Specimen: Blood   Result Value Ref Range    Glucose 137 (H) 65 - 99 mg/dL    BUN 24 (H) 8 - 23 mg/dL    Creatinine 0.96 0.57 - 1.00 mg/dL    Sodium 140 136 - 145 mmol/L    Potassium 4.0 3.5 - 5.2 mmol/L    Chloride 102 98 - 107 mmol/L    CO2 26.0 22.0 - 29.0 mmol/L    Calcium 9.7 8.6 - 10.5 mg/dL    BUN/Creatinine Ratio 25.0 7.0 - 25.0    Anion Gap 12.0 5.0 - 15.0 mmol/L    eGFR 61.8 >60.0 mL/min/1.73   Urinalysis With Microscopic If Indicated (No Culture) - Urine, Clean Catch    Specimen: Urine, Clean Catch   Result Value Ref Range    Color, UA Yellow Yellow, Straw, Dark Yellow, Luciana    Appearance, UA Clear Clear    pH, UA 7.0 5.0 - 9.0    Specific Gravity, UA 1.020 1.003 - 1.030    Glucose,  mg/dL (Trace) (A) Negative    Ketones, UA Negative Negative    Bilirubin, UA Negative Negative    Blood, UA Negative Negative    Protein,  mg/dL (2+) (A) Negative    Leuk Esterase, UA Negative Negative    Nitrite, UA Negative Negative    Urobilinogen, UA 0.2 E.U./dL 0.2 - 1.0 E.U./dL   CBC Auto Differential    Specimen: Blood   Result Value Ref Range    WBC 7.94 3.40 - 10.80 10*3/mm3    RBC 4.94 3.77 - 5.28 10*6/mm3    Hemoglobin 14.0 12.0 - 15.9 g/dL    Hematocrit 41.8 34.0 - 46.6 %    MCV 84.6 79.0 - 97.0 fL    MCH 28.3 26.6 - 33.0 pg    MCHC 33.5 31.5 - 35.7 g/dL    RDW 13.1 12.3 - 15.4 %    RDW-SD 40.2 37.0 - 54.0 fl    MPV 11.0 6.0 - 12.0 fL    Platelets 304 140 - 450 10*3/mm3    Neutrophil % 73.7 42.7 - 76.0 %    Lymphocyte % 16.1 (L) 19.6 - 45.3 %    Monocyte % 8.3 5.0 - 12.0 %    Eosinophil % 0.8 0.3 - 6.2 %    Basophil % 0.8 0.0 - 1.5 %    Immature Grans  % 0.3 0.0 - 0.5 %    Neutrophils, Absolute 5.86 1.70 - 7.00 10*3/mm3    Lymphocytes, Absolute 1.28 0.70 - 3.10 10*3/mm3    Monocytes, Absolute 0.66 0.10 - 0.90 10*3/mm3    Eosinophils, Absolute 0.06 0.00 - 0.40 10*3/mm3    Basophils, Absolute 0.06 0.00 - 0.20 10*3/mm3    Immature Grans, Absolute 0.02 0.00 - 0.05 10*3/mm3    nRBC 0.0 0.0 - 0.2 /100 WBC   Urinalysis, Microscopic Only - Urine, Clean Catch    Specimen: Urine, Clean Catch   Result Value Ref Range    RBC, UA 0-2 (A) None Seen /HPF    WBC, UA 3-5 None Seen, 0-2, 3-5 /HPF    Bacteria, UA None Seen None Seen /HPF    Squamous Epithelial Cells, UA 0-2 None Seen, 0-2 /HPF    Hyaline Casts, UA None Seen None Seen /LPF    Methodology Automated Microscopy    POC Glucose Once    Specimen: Blood   Result Value Ref Range    Glucose 128 70 - 130 mg/dL   ECG 12 Lead Bradycardia   Result Value Ref Range    QT Interval 594 ms    QTC Interval 557 ms   Gold Top - SST   Result Value Ref Range    Extra Tube Hold for add-ons.    Light Blue Top   Result Value Ref Range    Extra Tube Hold for add-ons.      CT Abdomen Pelvis Without Contrast   Preliminary Result   No acute findings in the abdomen or pelvis.  Bilateral renal cysts and 1.5 cm   indeterminate right renal lesion; recommend initial evaluation with nonemergent   renal ultrasound.  Pancolonic diverticulosis.  Small hiatal hernia.                                                   Medical Decision Making  Vital signs are stable, afebrile.  Blood pressure went from 240 systolic to 160 systolic with 20 of IV hydralazine.  Patient reports feeling lightheaded.  IVF bolus given.  Patient has a history of bradycardia on EKG review.  Heart rate is in the 80s.  Labs are unremarkable.  CT abdomen pelvis negative for acute findings.  Patient received pain medicine and Zofran.  She ambulated under her own power.  Orthostatics negative.  Recommended she take it easy and slow at home.  Recommend PCP follow-up.  Return precautions  given.  Patient states understanding and is agreeable to the plan.    Flank pain: acute illness or injury  Nausea and vomiting, unspecified vomiting type: acute illness or injury  Renal lesion: acute illness or injury  Amount and/or Complexity of Data Reviewed  Labs: ordered.  Radiology: ordered.  ECG/medicine tests: ordered.      Risk  Prescription drug management.          Final diagnoses:   Renal lesion   Flank pain   Nausea and vomiting, unspecified vomiting type       ED Disposition  ED Disposition     ED Disposition   Discharge    Condition   Stable    Comment   --             Misha Buck, APRN  [address]  [place] [id number] [phone]    Schedule an appointment as soon as possible for a visit in 2 days  ER follow up         Medication List      New Prescriptions    ondansetron ODT 4 MG disintegrating tablet  Commonly known as: ZOFRAN-ODT  Place 1 tablet on the tongue 4 (Four) Times a Day As Needed for Nausea or Vomiting.           Where to Get Your Medications      These medications were sent to [facility]/pharmacy #[id number] - [place], [place] - [address] [place] [address] - [phone]  - [phone] [address] [address]    Phone: [phone]   · ondansetron ODT 4 MG disintegrating tablet          Angel Weinberg MD  05/06/23 1898

## 2023-05-06 NOTE — ED NOTES
Patient ambulated with little assistance, patient reports dizziness and her head feeling funny. Md made aware. No new orders at this time.

## 2023-05-06 NOTE — ED NOTES
Patient presents to ED with c/o right sided flank pain at night that radiates to the front in the morning x3 days.

## 2023-05-12 ENCOUNTER — LAB (OUTPATIENT)
Dept: LAB | Facility: HOSPITAL | Age: 78
End: 2023-05-12
Payer: MEDICARE

## 2023-05-12 ENCOUNTER — OFFICE VISIT (OUTPATIENT)
Dept: FAMILY MEDICINE CLINIC | Facility: CLINIC | Age: 78
End: 2023-05-12
Payer: MEDICARE

## 2023-05-12 VITALS
HEART RATE: 77 BPM | HEIGHT: 61 IN | BODY MASS INDEX: 36.35 KG/M2 | DIASTOLIC BLOOD PRESSURE: 110 MMHG | SYSTOLIC BLOOD PRESSURE: 180 MMHG | WEIGHT: 192.5 LBS | RESPIRATION RATE: 18 BRPM | TEMPERATURE: 98.3 F | OXYGEN SATURATION: 97 %

## 2023-05-12 DIAGNOSIS — R10.9 RIGHT FLANK PAIN: ICD-10-CM

## 2023-05-12 DIAGNOSIS — Z11.59 NEED FOR HEPATITIS C SCREENING TEST: ICD-10-CM

## 2023-05-12 DIAGNOSIS — R10.32 LEFT LOWER QUADRANT ABDOMINAL PAIN: ICD-10-CM

## 2023-05-12 DIAGNOSIS — I10 ESSENTIAL HYPERTENSION: ICD-10-CM

## 2023-05-12 DIAGNOSIS — R35.89 POLYURIA: ICD-10-CM

## 2023-05-12 DIAGNOSIS — R73.9 HYPERGLYCEMIA: ICD-10-CM

## 2023-05-12 DIAGNOSIS — E78.2 MIXED HYPERLIPIDEMIA: ICD-10-CM

## 2023-05-12 DIAGNOSIS — Z00.00 ANNUAL PHYSICAL EXAM: ICD-10-CM

## 2023-05-12 DIAGNOSIS — Z00.00 ANNUAL PHYSICAL EXAM: Primary | ICD-10-CM

## 2023-05-12 DIAGNOSIS — I10 ESSENTIAL (PRIMARY) HYPERTENSION: ICD-10-CM

## 2023-05-12 LAB
ALBUMIN SERPL-MCNC: 4.2 G/DL (ref 3.5–5.2)
ALBUMIN UR-MCNC: 26.6 MG/DL
ALBUMIN/GLOB SERPL: 1.3 G/DL
ALP SERPL-CCNC: 75 U/L (ref 39–117)
ALT SERPL W P-5'-P-CCNC: 13 U/L (ref 1–33)
ANION GAP SERPL CALCULATED.3IONS-SCNC: 8 MMOL/L (ref 5–15)
AST SERPL-CCNC: 23 U/L (ref 1–32)
BACTERIA UR QL AUTO: ABNORMAL /HPF
BASOPHILS # BLD AUTO: 0.09 10*3/MM3 (ref 0–0.2)
BASOPHILS NFR BLD AUTO: 1.3 % (ref 0–1.5)
BILIRUB SERPL-MCNC: 0.5 MG/DL (ref 0–1.2)
BILIRUB UR QL STRIP: NEGATIVE
BUN SERPL-MCNC: 14 MG/DL (ref 8–23)
BUN/CREAT SERPL: 15.9 (ref 7–25)
CALCIUM SPEC-SCNC: 9.6 MG/DL (ref 8.6–10.5)
CHLORIDE SERPL-SCNC: 103 MMOL/L (ref 98–107)
CHOLEST SERPL-MCNC: 183 MG/DL (ref 0–200)
CLARITY UR: CLEAR
CO2 SERPL-SCNC: 25 MMOL/L (ref 22–29)
COLOR UR: YELLOW
CREAT SERPL-MCNC: 0.88 MG/DL (ref 0.57–1)
CREAT UR-MCNC: 67.4 MG/DL
DEPRECATED RDW RBC AUTO: 40.5 FL (ref 37–54)
EGFRCR SERPLBLD CKD-EPI 2021: 68.6 ML/MIN/1.73
EOSINOPHIL # BLD AUTO: 0.08 10*3/MM3 (ref 0–0.4)
EOSINOPHIL NFR BLD AUTO: 1.1 % (ref 0.3–6.2)
ERYTHROCYTE [DISTWIDTH] IN BLOOD BY AUTOMATED COUNT: 13.5 % (ref 12.3–15.4)
GLOBULIN UR ELPH-MCNC: 3.3 GM/DL
GLUCOSE SERPL-MCNC: 116 MG/DL (ref 65–99)
GLUCOSE UR STRIP-MCNC: ABNORMAL MG/DL
HBA1C MFR BLD: 5.6 % (ref 4.8–5.6)
HCT VFR BLD AUTO: 41.6 % (ref 34–46.6)
HCV AB SER DONR QL: NORMAL
HDLC SERPL-MCNC: 42 MG/DL (ref 40–60)
HGB BLD-MCNC: 14.4 G/DL (ref 12–15.9)
HGB UR QL STRIP.AUTO: NEGATIVE
HYALINE CASTS UR QL AUTO: ABNORMAL /LPF
IMM GRANULOCYTES # BLD AUTO: 0.01 10*3/MM3 (ref 0–0.05)
IMM GRANULOCYTES NFR BLD AUTO: 0.1 % (ref 0–0.5)
KETONES UR QL STRIP: NEGATIVE
LDLC SERPL CALC-MCNC: 114 MG/DL (ref 0–100)
LDLC/HDLC SERPL: 2.63 {RATIO}
LEUKOCYTE ESTERASE UR QL STRIP.AUTO: ABNORMAL
LYMPHOCYTES # BLD AUTO: 1.64 10*3/MM3 (ref 0.7–3.1)
LYMPHOCYTES NFR BLD AUTO: 22.8 % (ref 19.6–45.3)
MCH RBC QN AUTO: 28.7 PG (ref 26.6–33)
MCHC RBC AUTO-ENTMCNC: 34.6 G/DL (ref 31.5–35.7)
MCV RBC AUTO: 83 FL (ref 79–97)
MICROALBUMIN/CREAT UR: 394.7 MG/G
MONOCYTES # BLD AUTO: 0.63 10*3/MM3 (ref 0.1–0.9)
MONOCYTES NFR BLD AUTO: 8.8 % (ref 5–12)
NEUTROPHILS NFR BLD AUTO: 4.75 10*3/MM3 (ref 1.7–7)
NEUTROPHILS NFR BLD AUTO: 65.9 % (ref 42.7–76)
NITRITE UR QL STRIP: NEGATIVE
NRBC BLD AUTO-RTO: 0 /100 WBC (ref 0–0.2)
PH UR STRIP.AUTO: 6.5 [PH] (ref 5–8)
PLATELET # BLD AUTO: 317 10*3/MM3 (ref 140–450)
PMV BLD AUTO: 11.7 FL (ref 6–12)
POTASSIUM SERPL-SCNC: 4.1 MMOL/L (ref 3.5–5.2)
PROT SERPL-MCNC: 7.5 G/DL (ref 6–8.5)
PROT UR QL STRIP: ABNORMAL
RBC # BLD AUTO: 5.01 10*6/MM3 (ref 3.77–5.28)
RBC # UR STRIP: ABNORMAL /HPF
REF LAB TEST METHOD: ABNORMAL
SODIUM SERPL-SCNC: 136 MMOL/L (ref 136–145)
SP GR UR STRIP: 1.01 (ref 1–1.03)
SQUAMOUS #/AREA URNS HPF: ABNORMAL /HPF
TRIGL SERPL-MCNC: 152 MG/DL (ref 0–150)
TSH SERPL DL<=0.05 MIU/L-ACNC: 1.38 UIU/ML (ref 0.27–4.2)
UROBILINOGEN UR QL STRIP: ABNORMAL
VLDLC SERPL-MCNC: 27 MG/DL (ref 5–40)
WBC # UR STRIP: ABNORMAL /HPF
WBC NRBC COR # BLD: 7.2 10*3/MM3 (ref 3.4–10.8)

## 2023-05-12 PROCEDURE — 84443 ASSAY THYROID STIM HORMONE: CPT

## 2023-05-12 PROCEDURE — 80061 LIPID PANEL: CPT

## 2023-05-12 PROCEDURE — 86803 HEPATITIS C AB TEST: CPT

## 2023-05-12 PROCEDURE — 80053 COMPREHEN METABOLIC PANEL: CPT

## 2023-05-12 PROCEDURE — 87086 URINE CULTURE/COLONY COUNT: CPT

## 2023-05-12 PROCEDURE — 82570 ASSAY OF URINE CREATININE: CPT

## 2023-05-12 PROCEDURE — 81001 URINALYSIS AUTO W/SCOPE: CPT

## 2023-05-12 PROCEDURE — 85025 COMPLETE CBC W/AUTO DIFF WBC: CPT

## 2023-05-12 PROCEDURE — 36415 COLL VENOUS BLD VENIPUNCTURE: CPT

## 2023-05-12 PROCEDURE — 83036 HEMOGLOBIN GLYCOSYLATED A1C: CPT

## 2023-05-12 PROCEDURE — 82043 UR ALBUMIN QUANTITATIVE: CPT

## 2023-05-12 RX ORDER — AMLODIPINE BESYLATE 10 MG/1
10 TABLET ORAL DAILY
Qty: 90 TABLET | Refills: 3 | Status: SHIPPED | OUTPATIENT
Start: 2023-05-12

## 2023-05-12 RX ORDER — CHLORTHALIDONE 25 MG/1
25 TABLET ORAL DAILY
Qty: 90 TABLET | Refills: 1 | Status: SHIPPED | OUTPATIENT
Start: 2023-05-12

## 2023-05-12 RX ORDER — LOSARTAN POTASSIUM 100 MG/1
100 TABLET ORAL DAILY
Qty: 90 TABLET | Refills: 1 | Status: SHIPPED | OUTPATIENT
Start: 2023-05-12

## 2023-05-12 RX ORDER — ATENOLOL 25 MG/1
25 TABLET ORAL DAILY
Qty: 30 TABLET | Refills: 3 | Status: SHIPPED | OUTPATIENT
Start: 2023-05-12

## 2023-05-12 RX ORDER — DICLOFENAC SODIUM 75 MG/1
75 TABLET, DELAYED RELEASE ORAL 2 TIMES DAILY PRN
Qty: 20 TABLET | Refills: 0 | Status: SHIPPED | OUTPATIENT
Start: 2023-05-12

## 2023-05-12 NOTE — PROGRESS NOTES
"Subjective   Josi Roe is a 77 y.o. female.     History of Present Illness  CC: Annual exam-hypertension, hyperlipidemia, hyperglycemia, polyuria, lower abdominal pain, right flank pain  Hypertension  This is a chronic problem. The current episode started more than 1 year ago. The problem is uncontrolled. Pertinent negatives include no chest pain, headaches, palpitations or shortness of breath. Risk factors for coronary artery disease include family history, dyslipidemia, obesity, post-menopausal state and sedentary lifestyle. Current antihypertension treatment includes calcium channel blockers, beta blockers, diuretics and angiotensin blockers (Not clear the patient is taking her blood pressure medication or taking routinely as based on review of chart all of her medication should be out.  However she states she did take \"some medicine\" this morning). Compliance problems include exercise, diet and psychosocial issues.  Hypertensive end-organ damage includes CVA.   Hyperlipidemia  This is a chronic problem. The current episode started more than 1 year ago. Recent lipid tests were reviewed and are variable. Exacerbating diseases include diabetes and obesity. Factors aggravating her hyperlipidemia include fatty foods, thiazides and beta blockers. Pertinent negatives include no chest pain, focal sensory loss, focal weakness, leg pain, myalgias or shortness of breath. Current antihyperlipidemic treatment includes statins. The current treatment provides moderate improvement of lipids. Compliance problems include adherence to exercise, adherence to diet and psychosocial issues.  Risk factors for coronary artery disease include family history, dyslipidemia, hypertension, obesity, a sedentary lifestyle and post-menopausal.   Blood Sugar Problem  This is a chronic problem. The current episode started more than 1 year ago. The problem occurs constantly. The problem has been gradually worsening. Associated symptoms " include abdominal pain. Pertinent negatives include no arthralgias, chest pain, chills, congestion, coughing, fatigue, fever, headaches, myalgias, nausea, rash, sore throat or vomiting. The symptoms are aggravated by drinking and eating. She has tried nothing for the symptoms. The treatment provided no relief.   Difficulty Urinating  This is a new problem. The current episode started 1 to 4 weeks ago. The problem occurs constantly. The problem has been unchanged. Associated symptoms include abdominal pain. Pertinent negatives include no arthralgias, chest pain, chills, congestion, coughing, fatigue, fever, headaches, myalgias, nausea, rash, sore throat or vomiting. Associated symptoms comments: Urinary frequency  . Nothing aggravates the symptoms. She has tried nothing for the symptoms. The treatment provided no relief.   Abdominal Pain  This is a new problem. The current episode started in the past 7 days. The problem occurs constantly. The problem has been unchanged. The pain is located in the RUQ. The pain is at a severity of 4/10. The pain is mild. The quality of the pain is aching. The abdominal pain radiates to the right flank. Associated symptoms include frequency. Pertinent negatives include no arthralgias, constipation, diarrhea, dysuria, fever, headaches, myalgias, nausea, vomiting or weight loss. The pain is aggravated by certain positions and palpation. The pain is relieved by nothing. She has tried acetaminophen for the symptoms. The treatment provided no relief. Prior diagnostic workup includes CT scan (CT scan completed approximately 1 week ago.  Negative for any acute intra-abdominal process per radiology report.).        The following portions of the patient's history were reviewed and updated as appropriate: allergies, current medications, past family history, past medical history, past social history, past surgical history and problem list.    Review of Systems   Constitutional: Negative for  activity change, appetite change, chills, fatigue, fever, unexpected weight gain and unexpected weight loss.   HENT: Negative for congestion, sore throat, trouble swallowing and voice change.    Eyes: Negative.    Respiratory: Negative for cough, chest tightness, shortness of breath and wheezing.    Cardiovascular: Negative for chest pain, palpitations and leg swelling.   Gastrointestinal: Positive for abdominal pain. Negative for constipation, diarrhea, nausea and vomiting.   Endocrine: Negative.  Negative for cold intolerance, heat intolerance, polydipsia, polyphagia and polyuria.   Genitourinary: Positive for difficulty urinating, flank pain, frequency and urgency. Negative for dysuria.   Musculoskeletal: Negative for arthralgias and myalgias.   Skin: Negative for rash.   Allergic/Immunologic: Negative.    Neurological: Negative.  Negative for focal weakness.   Hematological: Negative.    Psychiatric/Behavioral: Negative.        Objective   Physical Exam  Vitals and nursing note reviewed.   Constitutional:       General: She is not in acute distress.     Appearance: Normal appearance. She is well-developed. She is obese. She is not ill-appearing, toxic-appearing or diaphoretic.   HENT:      Head: Normocephalic and atraumatic.   Eyes:      Conjunctiva/sclera: Conjunctivae normal.   Cardiovascular:      Rate and Rhythm: Normal rate and regular rhythm.      Heart sounds: Normal heart sounds.   Pulmonary:      Effort: Pulmonary effort is normal. No respiratory distress.      Breath sounds: Normal breath sounds. No stridor. No wheezing, rhonchi or rales.   Abdominal:      General: Bowel sounds are normal. There is no distension.      Palpations: There is no mass.      Tenderness: There is abdominal tenderness in the right lower quadrant. There is no guarding or rebound.      Hernia: No hernia is present.       Musculoskeletal:         General: No tenderness. Normal range of motion.      Cervical back: Normal range of  motion.   Skin:     General: Skin is warm and dry.      Coloration: Skin is not pale.      Findings: No erythema or rash.   Neurological:      Mental Status: She is alert and oriented to person, place, and time.   Psychiatric:         Mood and Affect: Mood normal.         Behavior: Behavior normal.         Thought Content: Thought content normal.         Judgment: Judgment normal.           Assessment & Plan   Diagnoses and all orders for this visit:    1. Annual physical exam (Primary)  -     CBC & Differential; Future  -     Comprehensive Metabolic Panel; Future  -     Hemoglobin A1c; Future  -     Lipid Panel; Future  -     TSH; Future, will call with results.    2. Left lower quadrant abdominal pain  -     Urinalysis With Culture If Indicated -; Future  -     diclofenac (VOLTAREN) 75 MG EC tablet; Take 1 tablet by mouth 2 (Two) Times a Day As Needed (right abdominal wall pain).  Dispense: 20 tablet; Refill: 0   - Considering the proximity of her pain to her iliac crest and a recent negative CT of her abdomen, her pain is most likely musculoskeletal.  We will prescribe diclofenac as needed.  We will obtain repeat UA.  We will call with results.    3. Right flank pain  -     Urinalysis With Culture If Indicated -; Future, will call with results.  Plan of care stated above #2.    4. Polyuria  -     Comprehensive Metabolic Panel; Future  -     Hemoglobin A1c; Future  -     Microalbumin / Creatinine Urine Ratio - Urine, Clean Catch; Future, will call with results.  UA from ER visit reviewed and showed glucose urea.  BMP also showed slightly elevated glucose levels.  Patient's last documented hemoglobin A1c 2 years ago was 6.2.  Concerned that patient has worsening glucose issues and possibly diabetes at this point.  We will call with lab results and further plan of care.    5. Hyperglycemia  -     Comprehensive Metabolic Panel; Future  -     Hemoglobin A1c; Future  -     Microalbumin / Creatinine Urine Ratio - Urine,  Clean Catch; Future, plan of care stated above #4.  We will call with results    6. Mixed hyperlipidemia  -     Comprehensive Metabolic Panel; Future  -     Hemoglobin A1c; Future  -     Lipid Panel; Future  -     TSH; Future, will call with results.  Continue low-fat diet Lipitor as prescribed.  We will continue to monitor.    7. Essential hypertension  -     CBC & Differential; Future  -     Comprehensive Metabolic Panel; Future  -     chlorthalidone (HYGROTON) 25 MG tablet; Take 1 tablet by mouth Daily.  Dispense: 90 tablet; Refill: 1  -     atenolol (Tenormin) 25 MG tablet; Take 1 tablet by mouth Daily.  Dispense: 30 tablet; Refill: 3  -     losartan (COZAAR) 100 MG tablet; Take 1 tablet by mouth Daily.  Dispense: 90 tablet; Refill: 1   - Patient reports being compliant with blood pressure medications however this is doubtful.  Based on her refill history patient should not have any blood pressure medication left.  Patient can not definitively tell me what she is taking for blood pressure medication either.  Her blood pressure medications have been refilled and she has been instructed to take as prescribed.  Refills for amlodipine, atenolol, chlorthalidone, losartan have been sent to pharmacy.  Low-sodium diet.  We will call with lab results.  We will continue to monitor.    8. Need for hepatitis C screening test  -     Hepatitis C Antibody; Future, will call with results.    9. Essential (primary) hypertension  -     amLODIPine (NORVASC) 10 MG tablet; Take 1 tablet by mouth Daily.  Dispense: 90 tablet; Refill: 3   -Plan of care stated above #7.    10.  Follow-up in 1 month or sooner for any acute needs.            This document has been electronically signed by FIDEL Urbina on May 12, 2023 12:22 CDT

## 2023-05-13 LAB — BACTERIA SPEC AEROBE CULT: NO GROWTH

## 2023-05-15 NOTE — PROGRESS NOTES
Hemoglobin A1c within normal limits.  Patient continues to have glucose urea.  We will recheck at follow-up.  Low-carb diet.  Lipid levels elevated.  Low-fat diet.  Take all medications as prescribed.  Follow-up as scheduled.

## 2023-07-28 ENCOUNTER — HOSPITAL ENCOUNTER (OUTPATIENT)
Dept: ULTRASOUND IMAGING | Facility: HOSPITAL | Age: 78
Discharge: HOME | End: 2023-07-28
Admitting: NURSE PRACTITIONER
Payer: MEDICARE

## 2023-07-28 DIAGNOSIS — N28.9 RENAL LESION: ICD-10-CM

## 2023-07-28 DIAGNOSIS — I10 UNCONTROLLED HYPERTENSION: ICD-10-CM

## 2023-07-28 PROCEDURE — 76775 US EXAM ABDO BACK WALL LIM: CPT

## 2023-09-17 DIAGNOSIS — I10 ESSENTIAL HYPERTENSION: ICD-10-CM

## 2023-09-18 RX ORDER — ATENOLOL 25 MG/1
TABLET ORAL
Qty: 90 TABLET | Refills: 1 | Status: SHIPPED | OUTPATIENT
Start: 2023-09-18

## (undated) DEVICE — WOUND RETRACTOR AND PROTECTOR: Brand: ALEXIS WOUND PROTECTOR-RETRACTOR

## (undated) DEVICE — GLV SURG SENSICARE POLYISPRN W/ALOE PF LF 6 GRN STRL

## (undated) DEVICE — GLV SURG SIGNATURE ESSENTIAL PF LTX SZ7.5

## (undated) DEVICE — STERILE POLYISOPRENE POWDER-FREE SURGICAL GLOVES WITH EMOLLIENT COATING: Brand: PROTEXIS

## (undated) DEVICE — PK MAJ PROC LF 60

## (undated) DEVICE — ADHS LIQ MASTISOL 2/3ML

## (undated) DEVICE — SUT VICRYL 4/0 SUTUPAK 18 J109T

## (undated) DEVICE — GAUZE,SPONGE,4"X4",16PLY,XRAY,STRL,LF: Brand: MEDLINE

## (undated) DEVICE — GLV SURG SENSICARE PI ORTHO SZ6.5 LF STRL

## (undated) DEVICE — 3M™ STERI-STRIP™ REINFORCED ADHESIVE SKIN CLOSURES, R1547, 1/2 IN X 4 IN (12 MM X 100 MM), 6 STRIPS/ENVELOPE: Brand: 3M™ STERI-STRIP™

## (undated) DEVICE — HARMONIC FOCUS SHEARS 9CM LENGTH + ADAPTIVE TISSUE TECHNOLOGY FOR USE WITH BLUE HAND PIECE ONLY: Brand: HARMONIC FOCUS

## (undated) DEVICE — SUT VIC 3/0 TIES 18IN J110T

## (undated) DEVICE — STERILE SLEEVE: Brand: CONVERTORS

## (undated) DEVICE — PENCL ES MEGADINE EZ/CLEAN BUTN W/HOLSTR 10FT

## (undated) DEVICE — SOL IRR NACL 0.9PCT BT 1000ML

## (undated) DEVICE — VIOLET BRAIDED (POLYGLACTIN 910), SYNTHETIC ABSORBABLE SUTURE: Brand: COATED VICRYL

## (undated) DEVICE — CONTAINER,SPECIMEN,OR STERILE,4OZ: Brand: MEDLINE